# Patient Record
Sex: FEMALE | Race: WHITE | NOT HISPANIC OR LATINO | Employment: UNEMPLOYED | URBAN - METROPOLITAN AREA
[De-identification: names, ages, dates, MRNs, and addresses within clinical notes are randomized per-mention and may not be internally consistent; named-entity substitution may affect disease eponyms.]

---

## 2017-08-10 ENCOUNTER — OFFICE VISIT (OUTPATIENT)
Dept: NEUROLOGY | Facility: MEDICAL CENTER | Age: 52
End: 2017-08-10
Payer: COMMERCIAL

## 2017-08-10 VITALS
BODY MASS INDEX: 26.84 KG/M2 | DIASTOLIC BLOOD PRESSURE: 72 MMHG | SYSTOLIC BLOOD PRESSURE: 114 MMHG | RESPIRATION RATE: 16 BRPM | HEART RATE: 66 BPM | HEIGHT: 69 IN | WEIGHT: 181.2 LBS | OXYGEN SATURATION: 99 % | TEMPERATURE: 97.7 F

## 2017-08-10 DIAGNOSIS — G40.909 SEIZURE DISORDER (HCC): ICD-10-CM

## 2017-08-10 DIAGNOSIS — R41.3 MEMORY DEFICIT: ICD-10-CM

## 2017-08-10 PROCEDURE — 99204 OFFICE O/P NEW MOD 45 MIN: CPT | Performed by: NURSE PRACTITIONER

## 2017-08-10 RX ORDER — IBUPROFEN 800 MG/1
800 TABLET ORAL EVERY 8 HOURS PRN
COMMUNITY
End: 2018-04-06

## 2017-08-10 RX ORDER — LORAZEPAM 1 MG/1
1 TABLET ORAL EVERY 4 HOURS PRN
Qty: 20 TAB | Refills: 0 | Status: SHIPPED | OUTPATIENT
Start: 2017-08-10 | End: 2017-12-18 | Stop reason: SDUPTHER

## 2017-08-10 RX ORDER — LAMOTRIGINE 300 MG/1
1 TABLET, EXTENDED RELEASE ORAL 2 TIMES DAILY
Qty: 180 TAB | Refills: 3 | Status: SHIPPED | OUTPATIENT
Start: 2017-08-10 | End: 2018-04-06 | Stop reason: SDUPTHER

## 2017-08-10 ASSESSMENT — ENCOUNTER SYMPTOMS
SORE THROAT: 0
HEADACHES: 0
HEARTBURN: 1
ABDOMINAL PAIN: 0
DOUBLE VISION: 0
NERVOUS/ANXIOUS: 0
COUGH: 0
MUSCULOSKELETAL NEGATIVE: 1
CONSTITUTIONAL NEGATIVE: 1
SEIZURES: 1
VOMITING: 0
MEMORY LOSS: 1
NAUSEA: 0
DEPRESSION: 0
DIARRHEA: 0

## 2017-08-10 ASSESSMENT — PATIENT HEALTH QUESTIONNAIRE - PHQ9: CLINICAL INTERPRETATION OF PHQ2 SCORE: 0

## 2017-08-10 NOTE — MR AVS SNAPSHOT
"        Sarita Karimi Luz Maria   8/10/2017 9:20 AM   Office Visit   MRN: [id number]    Department:  [facility]   Dept Phone:  [phone]    Description:  Female : 1965   Provider:  ZURDO Turner           Reason for Visit     New Patient Seizures      Allergies as of 8/10/2017     No Known Allergies      You were diagnosed with     Seizure disorder ([facility])   [286933]         Vital Signs     Blood Pressure Pulse Temperature Respirations Height Weight    114/72 mmHg 66 36.5 °C (97.7 °F) 16 1.753 m (5' 9\") 82.192 kg (181 lb 3.2 oz)    Body Mass Index Oxygen Saturation Smoking Status             26.75 kg/m2 99% Current Every Day Smoker         Basic Information     Date Of Birth Sex Race Ethnicity Preferred Language    1965 Female White Non- English      Problem List              ICD-10-CM Priority Class Noted - Resolved    Abdominal pain, unspecified site R10.9   2016 - Present      Health Maintenance     Patient has no pending health maintenance at this time      Current Immunizations     No immunizations on file.      Below and/or attached are the medications your provider expects you to take. Review all of your home medications and newly ordered medications with your provider and/or pharmacist. Follow medication instructions as directed by your provider and/or pharmacist. Please keep your medication list with you and share with your provider. Update the information when medications are discontinued, doses are changed, or new medications (including over-the-counter products) are added; and carry medication information at all times in the event of emergency situations     Allergies:  No Known Allergies          Medications  Valid as of: August 10, 2017 - 10:22 AM    Generic Name Brand Name Tablet Size Instructions for use    Ibuprofen (Tab) MOTRIN 800 MG Take 800 mg by mouth every 8 hours as needed.        LamoTRIgine (TABLET SR 24 HR) LamoTRIgine 300 MG Take 1 mg by " mouth 2 Times a Day.        LORazepam (Tab) ATIVAN 1 MG Take 1 Tab by mouth every four hours as needed for Anxiety.        Naproxen Sodium (Tab) ANAPROX 220 MG Take 220 mg by mouth as needed. Indications: Mild to Moderate Pain        Omeprazole (CAPSULE DELAYED RELEASE) PRILOSEC 40 MG Take 40 mg by mouth 2 times a day.        .                 Medicines prescribed today were sent to:     [address], [place] - [address] [facility] AT [place] [address]    [address] [facility] [place] [address]    Phone: [phone] Fax: [phone]    Open 24 Hours?: No      Medication refill instructions:       If your prescription bottle indicates you have medication refills left, it is not necessary to call your provider’s office. Please contact your pharmacy and they will refill your medication.    If your prescription bottle indicates you do not have any refills left, you may request refills at any time through one of the following ways: The online [employer] system (except Urgent Care), by calling your provider’s office, or by asking your pharmacy to contact your provider’s office with a refill request. Medication refills are processed only during regular business hours and may not be available until the next business day. Your provider may request additional information or to have a follow-up visit with you prior to refilling your medication.   *Please Note: Medication refills are assigned a new Rx number when refilled electronically. Your pharmacy may indicate that no refills were authorized even though a new prescription for the same medication is available at the pharmacy. Please request the medicine by name with the pharmacy before contacting your provider for a refill.        Your To Do List     Future Labs/Procedures Complete By Expires    CBC WITH DIFFERENTIAL  As directed 8/10/2018    COMP METABOLIC PANEL  As directed 8/11/2018    LAMOTRIGINE  As directed 8/10/2018    Comments:     Draw at least 8 hours after medication is taken.    MR-BRAIN-WITH & W/O  As directed 8/10/2018    VITAMIN D,25 HYDROXY  As directed 8/10/2018      Referral     A referral request has been sent to our patient care coordination department. Please allow 3-5 business days for us to process this request and contact you either by phone or mail. If you do not hear from us by the 5th business day, please call us at [phone].           CHORD Access Code: Activation code not generated  Current Agent Pandahart Status: Active          Quit Tobacco Information     Do you want to quit using tobacco?    Quitting tobacco decreases risks of cancer, heart and lung disease, increases life expectancy, improves sense of taste and smell, and increases spending money, among other benefits.    If you are thinking about quitting, we can help.  • [facility] Quit Tobacco Program: 282.218.4571  o Program occurs weekly for four weeks and includes pharmacist consultation on products to support quitting smoking or chewing tobacco. A provider referral is needed for pharmacist consultation.  • Tobacco Users Help Hotline: 1-493-QUIT-NOW (498-9800) or https://[place].quitlogix.org/  o Free, confidential telephone and online coaching for [place] residents. Sessions are designed on a schedule that is convenient for you. Eligible clients receive free nicotine replacement therapy.  • Nationally: www.smokefree.gov  o Information and professional assistance to support both immediate and long-term needs as you become, and remain, a non-smoker. Smokefree.gov allows you to choose the help that best fits your needs.

## 2017-08-19 LAB
25(OH)D3+25(OH)D2 SERPL-MCNC: 36.7 NG/ML (ref 30–100)
ALBUMIN SERPL-MCNC: 5.1 G/DL (ref 3.5–5.5)
ALBUMIN/GLOB SERPL: 2.1 {RATIO} (ref 1.2–2.2)
ALP SERPL-CCNC: 91 IU/L (ref 39–117)
ALT SERPL-CCNC: 8 IU/L (ref 0–32)
AST SERPL-CCNC: 14 IU/L (ref 0–40)
BASOPHILS # BLD AUTO: 0 X10E3/UL (ref 0–0.2)
BASOPHILS NFR BLD AUTO: 0 %
BILIRUB SERPL-MCNC: 0.4 MG/DL (ref 0–1.2)
BUN SERPL-MCNC: 13 MG/DL (ref 6–24)
BUN/CREAT SERPL: 18 (ref 9–23)
CALCIUM SERPL-MCNC: 10.2 MG/DL (ref 8.7–10.2)
CHLORIDE SERPL-SCNC: 104 MMOL/L (ref 96–106)
CO2 SERPL-SCNC: 23 MMOL/L (ref 18–29)
CREAT SERPL-MCNC: 0.73 MG/DL (ref 0.57–1)
EOSINOPHIL # BLD AUTO: 0.1 X10E3/UL (ref 0–0.4)
EOSINOPHIL NFR BLD AUTO: 1 %
ERYTHROCYTE [DISTWIDTH] IN BLOOD BY AUTOMATED COUNT: 14.6 % (ref 12.3–15.4)
GLOBULIN SER CALC-MCNC: 2.4 G/DL (ref 1.5–4.5)
GLUCOSE SERPL-MCNC: 97 MG/DL (ref 65–99)
HCT VFR BLD AUTO: 48.8 % (ref 34–46.6)
HGB BLD-MCNC: 16.3 G/DL (ref 11.1–15.9)
IMM GRANULOCYTES # BLD: 0 X10E3/UL (ref 0–0.1)
IMM GRANULOCYTES NFR BLD: 0 %
IMMATURE CELLS  115398: ABNORMAL
LAMOTRIGINE SERPL-MCNC: 9.5 UG/ML (ref 2–20)
LYMPHOCYTES # BLD AUTO: 2.1 X10E3/UL (ref 0.7–3.1)
LYMPHOCYTES NFR BLD AUTO: 21 %
MCH RBC QN AUTO: 30.3 PG (ref 26.6–33)
MCHC RBC AUTO-ENTMCNC: 33.4 G/DL (ref 31.5–35.7)
MCV RBC AUTO: 91 FL (ref 79–97)
MONOCYTES # BLD AUTO: 0.4 X10E3/UL (ref 0.1–0.9)
MONOCYTES NFR BLD AUTO: 4 %
MORPHOLOGY BLD-IMP: ABNORMAL
NEUTROPHILS # BLD AUTO: 7.7 X10E3/UL (ref 1.4–7)
NEUTROPHILS NFR BLD AUTO: 74 %
NRBC BLD AUTO-RTO: ABNORMAL %
PLATELET # BLD AUTO: 274 X10E3/UL (ref 150–379)
POTASSIUM SERPL-SCNC: 4.5 MMOL/L (ref 3.5–5.2)
PROT SERPL-MCNC: 7.5 G/DL (ref 6–8.5)
RBC # BLD AUTO: 5.38 X10E6/UL (ref 3.77–5.28)
SODIUM SERPL-SCNC: 144 MMOL/L (ref 134–144)
WBC # BLD AUTO: 10.4 X10E3/UL (ref 3.4–10.8)

## 2017-11-17 ENCOUNTER — HOSPITAL ENCOUNTER (OUTPATIENT)
Dept: RADIOLOGY | Facility: MEDICAL CENTER | Age: 52
End: 2017-11-17
Attending: NURSE PRACTITIONER
Payer: COMMERCIAL

## 2017-11-17 ENCOUNTER — NON-PROVIDER VISIT (OUTPATIENT)
Dept: NEUROLOGY | Facility: MEDICAL CENTER | Age: 52
End: 2017-11-17
Payer: COMMERCIAL

## 2017-11-17 VITALS — HEIGHT: 69 IN | WEIGHT: 170 LBS | BODY MASS INDEX: 25.18 KG/M2

## 2017-11-17 DIAGNOSIS — G40.909 SEIZURE DISORDER (HCC): ICD-10-CM

## 2017-11-17 PROCEDURE — 95951 PR EEG MONITORING/VIDEORECORD: CPT | Mod: 52 | Performed by: PSYCHIATRY & NEUROLOGY

## 2017-11-17 PROCEDURE — A9270 NON-COVERED ITEM OR SERVICE: HCPCS | Performed by: RADIOLOGY

## 2017-11-17 PROCEDURE — A9579 GAD-BASE MR CONTRAST NOS,1ML: HCPCS | Performed by: NURSE PRACTITIONER

## 2017-11-17 PROCEDURE — 70553 MRI BRAIN STEM W/O & W/DYE: CPT

## 2017-11-17 PROCEDURE — 700102 HCHG RX REV CODE 250 W/ 637 OVERRIDE(OP): Performed by: RADIOLOGY

## 2017-11-17 PROCEDURE — 700117 HCHG RX CONTRAST REV CODE 255: Performed by: NURSE PRACTITIONER

## 2017-11-17 RX ORDER — DIAZEPAM 5 MG/1
10 TABLET ORAL
Status: COMPLETED | OUTPATIENT
Start: 2017-11-17 | End: 2017-11-17

## 2017-11-17 RX ADMIN — DIAZEPAM 10 MG: 5 TABLET ORAL at 10:40

## 2017-11-17 RX ADMIN — GADODIAMIDE 20 ML: 287 INJECTION INTRAVENOUS at 11:48

## 2017-11-17 ASSESSMENT — PAIN SCALES - GENERAL
PAINLEVEL_OUTOF10: 0
PAINLEVEL_OUTOF10: 0

## 2017-11-17 NOTE — DISCHARGE INSTRUCTIONS
MRI ADULT DISCHARGE INSTRUCTIONS    You have been medicated today for your scan. Please follow the instructions below to ensure your safe recovery. If you have any questions or problems, feel free to call us at [phone] or [phone].     1.   Have someone stay with you to assist you as needed.    2.   Do not drive or operate any mechanical devices.    3.   Do not perform any activity that requires concentration. Make no major decisions over the next 24 hours.     4.   Be careful changing positions from laying down to sitting or standing, as you may become dizzy.     5.   Do not drink alcohol for 48 hours.    6.   There are no restrictions for eating your normal meals. Drink fluids.    7.   You may continue your usual medications for pain, tranquilizers, muscle relaxants or sedatives when awake.     8.   Tomorrow, you may continue your normal daily activities.     9.   Pressure dressing on 10 - 15 minutes. If swelling or bleeding occurs when removed, continue placing direct pressure on injection site for another 5 minutes, or until bleeding stops.     I have been informed of and understand the above discharge instructions. Diazepam (VALIUM) Oral solution  What is this medicine?  You were prescribed DIAZEPAM (dye AZ e vani) for the procedure you had today. This medication is a benzodiazepine. It is used to treat anxiety and nervousness. It also can help treat alcohol withdrawal, relax muscles, and treat certain types of seizures.  This medicine may be used for other purposes; ask your health care provider or pharmacist if you have questions.  What side effects may I notice from receiving this medicine?  Side effects that you should report to your doctor or health care professional as soon as possible:  • allergic reactions like skin rash, itching or hives, swelling of the face, lips, or tongue  • angry, confused, depressed, other mood changes  • breathing problems  • feeling faint or lightheaded, falls  • muscle  cramps  • problems with balance, talking, walking  • restlessness  • tremors  • trouble passing urine or change in the amount of urine  • unusually weak or tired  Side effects that usually do not require medical attention (report to your doctor or health care professional if they continue or are bothersome):  • difficulty sleeping, nightmares  • dizziness, drowsiness, clumsiness, or unsteadiness, a hangover effect  • headache  • nausea, vomiting  This list may not describe all possible side effects. Call your doctor for medical advice about side effects. You may report side effects to FDA at 4-437-SZO-3800.

## 2017-11-21 ENCOUNTER — TELEPHONE (OUTPATIENT)
Dept: NEUROLOGY | Facility: MEDICAL CENTER | Age: 52
End: 2017-11-21

## 2017-11-21 NOTE — PROCEDURES
VIDEO ELECTROENCEPHALOGRAM REPORT        Referring provider: MARY JANE Raygoza.      DOS: 11/17/2017 (total recording of 36 minutes).      INDICATION:  Sarita Loera 51 y.o. female presenting with history of seizures.      CURRENT ANTIEPILEPTIC REGIMEN: Lamotrigine 300 mg po bid.      TECHNIQUE: 30 channel video electroencephalogram (EEG) was performed in accordance with the international 10-20 system. The study was reviewed in bipolar and referential montages. The recording examined the patient during wakeful and drowsy state(s).      DESCRIPTION OF THE RECORD:  During the wakefulness, the background showed a symmetrical 12 Hz alpha activity posteriorly with amplitude of 70 mV.  There was reactivity to eye closure/opening.  A normal anterior-posterior gradient was noted with faster beta frequencies seen anteriorly.  During drowsiness, theta frequencies were seen.     ACTIVATION PROCEDURES:   Hyperventilation was performed by the patient for a total of 3 minutes. The technician performing the test noted good effort. However, no significant background changes noted.      Intermittent Photic stimulation was performed in a stepwise fashion from 1 to 30 Hz and elicited a normal response (photic driving), most noticeable in the posterior leads.        ICTAL AND/OR INTERICTAL FINDINGS:   Frequent left anterior temporal sharps and intermittent left anterior temporal slowing. No clinical events or seizures were reported or recorded during the study.      EKG: sampling of the EKG recording demonstrated sinus rhythm.         INTERPRETATION:  This is an abnormal video EEG recording in the awake, and drowsy state(s). Frequent left anterior temporal sharps and intermittent left anterior temporal slowing. The findings increase risk for seizures and suggest underlying area of cortical irritability and structural abnormality. Clinical and radiological correlation is recommended.           Nixon Tran MD  Medical Director,  Epilepsy and Neurodiagnostics.   Clinical  of Neurology [facility].   Diplomate in Neurology, Epilepsy, and Electrodiagnostic Medicine.   Office: [phone]  Fax: [phone]       GAEL PICHARDO    DD:  11/20/2017 16:49:21  DT:  11/20/2017 16:55:32    D#:  [id number]  Job#:  [id number]    cc: SINGH HINTON

## 2017-11-21 NOTE — PROGRESS NOTES
VIDEO ELECTROENCEPHALOGRAM REPORT      Referring provider: MARY JANE Raygoza.     DOS: 11/17/2017 (total recording of 36 minutes).     INDICATION:  Sarita Loera 51 y.o. female presenting with history of seizures.     CURRENT ANTIEPILEPTIC REGIMEN: Lamotrigine 300 mg po bid.     TECHNIQUE: 30 channel video electroencephalogram (EEG) was performed in accordance with the international 10-20 system. The study was reviewed in bipolar and referential montages. The recording examined the patient during wakeful and drowsy state(s).     DESCRIPTION OF THE RECORD:  During the wakefulness, the background showed a symmetrical 12 Hz alpha activity posteriorly with amplitude of 70 mV.  There was reactivity to eye closure/opening.  A normal anterior-posterior gradient was noted with faster beta frequencies seen anteriorly.  During drowsiness, theta frequencies were seen.    ACTIVATION PROCEDURES:   Hyperventilation was performed by the patient for a total of 3 minutes. The technician performing the test noted good effort. However, no significant background changes noted.     Intermittent Photic stimulation was performed in a stepwise fashion from 1 to 30 Hz and elicited a normal response (photic driving), most noticeable in the posterior leads.      ICTAL AND/OR INTERICTAL FINDINGS:   Frequent left anterior temporal sharps and intermittent left anterior temporal slowing. No clinical events or seizures were reported or recorded during the study.     EKG: sampling of the EKG recording demonstrated sinus rhythm.       INTERPRETATION:  This is an abnormal video EEG recording in the awake, and drowsy state(s). Frequent left anterior temporal sharps and intermittent left anterior temporal slowing. The findings increase risk for seizures and suggest underlying area of cortical irritability and structural abnormality. Clinical and radiological correlation is recommended.        Nixon Tran MD  Medical Director, Epilepsy and  Neurodiagnostics.   Clinical  of Neurology [facility].   Diplomate in Neurology, Epilepsy, and Electrodiagnostic Medicine.   Office: [phone]  Fax: [phone]

## 2017-12-14 ENCOUNTER — APPOINTMENT (OUTPATIENT)
Dept: NEUROLOGY | Facility: MEDICAL CENTER | Age: 52
End: 2017-12-14
Payer: COMMERCIAL

## 2017-12-18 ENCOUNTER — OFFICE VISIT (OUTPATIENT)
Dept: NEUROLOGY | Facility: MEDICAL CENTER | Age: 52
End: 2017-12-18
Payer: COMMERCIAL

## 2017-12-18 DIAGNOSIS — G40.909 SEIZURE DISORDER (HCC): ICD-10-CM

## 2017-12-18 DIAGNOSIS — G40.109 PARTIAL SEIZURE DISORDER (HCC): ICD-10-CM

## 2017-12-18 PROCEDURE — 99213 OFFICE O/P EST LOW 20 MIN: CPT | Performed by: NURSE PRACTITIONER

## 2017-12-18 RX ORDER — HYDROCODONE BITARTRATE AND ACETAMINOPHEN 7.5; 325 MG/1; MG/1
1-2 TABLET ORAL EVERY 6 HOURS PRN
COMMUNITY
End: 2018-04-06

## 2017-12-18 RX ORDER — LORAZEPAM 1 MG/1
TABLET ORAL
Qty: 20 TAB | Refills: 0 | Status: SHIPPED
Start: 2017-12-18 | End: 2018-04-06 | Stop reason: SDUPTHER

## 2017-12-18 NOTE — PROGRESS NOTES
"Subjective:      Sarita Loera is a 52 y.o. female who presents with Follow-Up (Seizure disorder)    Here with  today.        HPI  Started on propranolol to treat a tremor-- per PCP.  This caused \"multiple seizures\", feeling very dizzy.  She took this medication, unknown dose, for about 6 months and stopped about one month ago.  However, this time frame does not add up as our last appointment was 8/10/2017 and she was not taking the propranolol at that time (today is 12/18/17).    They report that she has not had any seizures in the past few months.    11/2017:  ICTAL AND/OR INTERICTAL FINDINGS:   Frequent left anterior temporal sharps and intermittent left anterior temporal slowing. No clinical events or seizures were reported or recorded during the study.     Brain MRI 2017:  Impression     1.  No evidence of acute territorial infarct, intracranial hemorrhage or mass lesion.  2.  Rare scattered nonspecific periventricular foci of T2 and FLAIR signal hyperintensities consistent with microangiopathic ischemic change versus demyelination or gliosis.     When she takes ativan for sleep then she sleeps very well.    Mood is not as stable.  They comment that she is emotionally labile and has minimal tolerance of people in general.    She appears altered, foggy, with her train of though and conversation skills today.    Current Outpatient Prescriptions   Medication Sig Dispense Refill   • hydrocodone-acetaminophen (NORCO) 7.5-325 MG per tablet Take 1-2 Tabs by mouth every 6 hours as needed.     • ibuprofen (MOTRIN) 800 MG Tab Take 800 mg by mouth every 8 hours as needed.     • LamoTRIgine (LAMICTAL XR) 300 MG TABLET SR 24 HR Take 1 mg by mouth 2 Times a Day. 180 Tab 3   • lorazepam (ATIVAN) 1 MG Tab Take 1 Tab by mouth every four hours as needed for Anxiety. 20 Tab 0   • naproxen (ALEVE) 220 MG tablet Take 220 mg by mouth as needed. Indications: Mild to Moderate Pain     • omeprazole (PRILOSEC) 40 MG " delayed-release capsule Take 40 mg by mouth 2 times a day.       No current facility-administered medications for this visit.        Review of Systems   HENT: Negative for hearing loss, nosebleeds and sore throat.         No recent head injury.   Eyes: Negative for double vision.        No new loss of vision.   Respiratory: Negative for cough.         No recent lung infections.   Cardiovascular: Negative for chest pain.   Gastrointestinal: Positive for heartburn (Cosme's esophagus). Negative for abdominal pain, diarrhea, nausea and vomiting.   Genitourinary: Negative.    Musculoskeletal: Negative.    Skin: Negative.    Neurological: Positive for seizures. Negative for headaches.   Endo/Heme/Allergies:        No history of endocrine dysfunction.  No new problems.   Psychiatric/Behavioral: Positive for memory loss. Negative for depression. The patient is nervous/anxious.         No recent mood changes.          Objective:     There were no vitals taken for this visit.     Physical Exam   Constitutional: She is oriented to person, place, and time. She appears well-developed and well-nourished.   HENT:   Head: Normocephalic and atraumatic.   Eyes: EOM are normal.   Wears glasses     Neck: Normal range of motion.   Cardiovascular: Normal rate and regular rhythm.    Pulmonary/Chest: Effort normal.   Neurological: She is alert and oriented to person, place, and time. She exhibits normal muscle tone. Gait normal.   No observable changes in neurologic status.  See initial new patient examination for details.    Skin: Skin is warm. There is pallor.   Psychiatric: She is slowed. She expresses impulsivity. She exhibits abnormal recent memory.             Assessment/Plan:     Seizure disorder:  History of events suggestive of complex partial seizures with secondary generalization since 2004.  Only AED prescribed is lamotrigine and Lamictal.  Continues to have uncontrolled seizures primarily nocturnal in nature however there  have been no obvious seizures in a few months.    INTERPRETATION:  This is an abnormal video EEG recording in the awake, and drowsy state(s). Frequent left anterior temporal sharps and intermittent left anterior temporal slowing. The findings increase risk for seizures and suggest underlying area of cortical irritability and structural abnormality. Clinical and radiological correlation is recommended.     1) Will continue Lamictal ER 300mg BID.  2)  Discussed AED options-- she is very irritable and appears to be altered-- using marijuana throughout the day.  Describing possible peak dose side effects.    New Rx for ativan 1mg tablet provided.     Discussed adding a new AED-- consider Fycompa, Vimpat, Zonegran, Keppra, etc.  There are many options available to her.     Wishes to first have her VEEG and then proceed with addition of a new AED.     Obtain labs as ordered.     Return for follow-up in 3 months to discuss diagnostic evaluation.        EDUCATION AND COUNSELING:  -Education was provided to the patient and/or family regarding diagnosis and prognosis. The chronic and unpredictable nature of the condition was discussed. There is increased risk for additional events, which may carry potential for significant injuries and death.    -We reviewed the current antiepileptic regimen. Potential side effects of antiepileptics were discussed at length, including but no limited to: hypersensitivity reactions (rash and others, some of which can be fatal), visual field changes (some of which may be irreversible), glaucoma, diplopia, kidney stones, osteopenia/osteoporosis/bone fractures, hyperthermia/anhydrosis, tremors, ataxia, dizziness, fatigue, increased risk for falls, cardiac arrhythmias/syncope, gastrointestinal (hepatitis, pancreatitis, gastritis, ulcers), gingival hypertrophy, drowsiness, sedation, anxiety/nervousness, increased risk for suicide, increased risk for depression, and psychosis. We reviewed drug-drug  interactions and their potential effect on seizure control and medication side effects.    -Patient/family educated on SUDEP (Sudden Death in Epilepsy). Counseling was provided on the importance of strict medication and follow up compliance. The patient understands the risks associated with non-adherence with the medical plan as outlined, including but not limited to an increased risk for breakthrough seizures, which may contribute to injuries, disability, status epilepticus, and even death.    -Counseling was also provided on potential effects of alcohol and other drugs, which may lower seizure threshold and/or affect the metabolism of antiepileptic drugs. I recommend avoidance of alcohol and illegal drugs.  -Recommend proper hydration, regular exercise, proper sleep hygiene (7-8 hrs of overnight sleep, avoid sleep deprivation).    -I have made the patient aware of mandatory reporting required by the law in the State of [place] regarding episodes of seizures, loss of consciousness, and/or alteration of awareness. The patient and family are responsible for reporting events to the DMV, instructions were provided. The patient verbalized understanding and states she minimally is driving. Other seizure precautions were discussed at length, including no diving, no skydiving, no unsupervised swimming, no Jacuzzi or bathing in bathtubs.       Pt agrees with plan.

## 2017-12-19 ASSESSMENT — ENCOUNTER SYMPTOMS
SEIZURES: 1
VOMITING: 0
DIARRHEA: 0
MEMORY LOSS: 1
SORE THROAT: 0
HEADACHES: 0
NAUSEA: 0
COUGH: 0
NERVOUS/ANXIOUS: 1
HEARTBURN: 1
ABDOMINAL PAIN: 0
MUSCULOSKELETAL NEGATIVE: 1
DEPRESSION: 0
DOUBLE VISION: 0

## 2018-02-14 ENCOUNTER — TELEPHONE (OUTPATIENT)
Dept: NEUROLOGY | Facility: MEDICAL CENTER | Age: 53
End: 2018-02-14

## 2018-02-14 NOTE — TELEPHONE ENCOUNTER
Received voicemail from patient's  today, he states that patient has been having increased seizure activity the last couple of nights during the nights. Called patient back to get some further information on what is happening. Left voicemail asking for a phone call back.

## 2018-02-15 NOTE — TELEPHONE ENCOUNTER
When you contact either Sarita or , please find out where she is with the Fycompa titration.  Has that made a difference with her seizures?    Did they  the Ativan in December?

## 2018-02-16 NOTE — TELEPHONE ENCOUNTER
Spoke with patient's  this morning.     When change was noticed- 02/13/18  Frequency- multiple   Type of seizure- Sleeping, Shaking, one right after the other.    Current Medications- Fycompa 2 mg, Lamictal Xr 300 BID,      Any Medication changes?- Fycompa   Date- since last appointment    Illness/Fever? n  Menses? n  Stress? Yes- getting ready to travel to [place], her father's health is failing.    Sleep Deprivation? n  Driving? n    Caller: Be Loera  Call Back #: [phone]  OK to leave detailed message: yes    Patient's  reports that patient when patient upped her dosage of Fycompa to 3mg that she started experiencing a side effect that her skin was burning. Patient described it like she was on fire from the inside out. Patient dropped back down the the 1 tab and the symptoms went away. Up until this last stressful week, patient has been doing very well. Ativan he believes was picked up.

## 2018-02-27 NOTE — TELEPHONE ENCOUNTER
Left message letting them know that we just wanted to check in and see how everything is going. Asked that they give us a phone call back at their earliest convenience.

## 2018-02-28 NOTE — TELEPHONE ENCOUNTER
Spoke with patient's  today, he states that patient has been doing well. No further episodes to report. Patient made it through the last two extremely stressful weeks without having any further issues.

## 2018-04-06 ENCOUNTER — TELEPHONE (OUTPATIENT)
Dept: NEUROLOGY | Facility: MEDICAL CENTER | Age: 53
End: 2018-04-06

## 2018-04-06 ENCOUNTER — OFFICE VISIT (OUTPATIENT)
Dept: NEUROLOGY | Facility: MEDICAL CENTER | Age: 53
End: 2018-04-06
Payer: COMMERCIAL

## 2018-04-06 VITALS
SYSTOLIC BLOOD PRESSURE: 100 MMHG | HEART RATE: 66 BPM | BODY MASS INDEX: 27.1 KG/M2 | WEIGHT: 182.98 LBS | DIASTOLIC BLOOD PRESSURE: 70 MMHG | HEIGHT: 69 IN | RESPIRATION RATE: 16 BRPM | OXYGEN SATURATION: 96 %

## 2018-04-06 DIAGNOSIS — G40.909 SEIZURE DISORDER (HCC): ICD-10-CM

## 2018-04-06 DIAGNOSIS — G40.109 PARTIAL SEIZURE DISORDER (HCC): ICD-10-CM

## 2018-04-06 PROCEDURE — 99213 OFFICE O/P EST LOW 20 MIN: CPT | Performed by: NURSE PRACTITIONER

## 2018-04-06 RX ORDER — OMEPRAZOLE 40 MG/1
CAPSULE, DELAYED RELEASE ORAL
COMMUNITY
Start: 2018-02-26 | End: 2021-09-20

## 2018-04-06 RX ORDER — LAMOTRIGINE 300 MG/1
1 TABLET, EXTENDED RELEASE ORAL 2 TIMES DAILY
Qty: 180 TAB | Refills: 3 | Status: SHIPPED | OUTPATIENT
Start: 2018-04-06 | End: 2019-06-27 | Stop reason: SDUPTHER

## 2018-04-06 RX ORDER — LORAZEPAM 1 MG/1
TABLET ORAL
Qty: 20 TAB | Refills: 0 | Status: SHIPPED | OUTPATIENT
Start: 2018-04-06 | End: 2018-08-05

## 2018-04-06 ASSESSMENT — ENCOUNTER SYMPTOMS
DEPRESSION: 0
DIARRHEA: 0
SEIZURES: 1
SORE THROAT: 0
VOMITING: 0
NERVOUS/ANXIOUS: 1
COUGH: 0
CONSTITUTIONAL NEGATIVE: 1
NAUSEA: 0
HEADACHES: 0
DOUBLE VISION: 0
MUSCULOSKELETAL NEGATIVE: 1
HEARTBURN: 1
ABDOMINAL PAIN: 0
MEMORY LOSS: 1

## 2018-04-06 NOTE — TELEPHONE ENCOUNTER
[facility] called [facility] just needed some clarification on pt's Fycompa 2 mg because part of the directions were cut off through fax.

## 2018-04-06 NOTE — TELEPHONE ENCOUNTER
Prescription for FYCOMPA  2mg Take 3 mg PO qhs x2 weeks, 4 mg PO qhs thereafter w/ 5 refills sent to [facility] via Fax on 4/6/2018.  Confirmation scanned into chart.

## 2018-04-06 NOTE — PROGRESS NOTES
Subjective:      Sarita Loera is a 52 y.o. female who presents with Follow-Up (PT states she had a seizure two weeks ago and bit her tongue prety badly.  Has had multiple seizures within the four months.  Needs refills of lamotrigine and perampanel.  Would like a refill of lorazepam for anxieyt.)          HPI  Here for a follow-up today.    Really appreciates the ativan 1mg tablets.  She feels like she has good seizure control with the current medications.  Of note, she did run out of name brand Lamictal about 3 weeks ago and had a seizure.  She is now taking generic lamotrigine and feels just fine.  She is asking for a generic prescription today.    Going to [place] in a month from now.      11/2017:  ICTAL AND/OR INTERICTAL FINDINGS:   Frequent left anterior temporal sharps and intermittent left anterior temporal slowing. No clinical events or seizures were reported or recorded during the study.     Current Outpatient Prescriptions   Medication Sig Dispense Refill   • omeprazole (PRILOSEC) 40 MG delayed-release capsule 1 po QD     • perampanel (FYCOMPA) 2 MG Tab tablet Take 3 Tabs by mouth every bedtime for 181 days. Titrate per separate written instructions.  Take 2mg po qhs X 2 weeks then 3mg po qhs X 2 weeks then 4mg po qhs X 2 weeks then 5mg po qhs X 2 weeks then 6mg po qhs thereafter. 90 Tab 5   • lorazepam (ATIVAN) 1 MG Tab Take 1/2-1 tablet PO PRN breakthrough seizures.  Do not exceed more than 2 tablets in 24 hours unless directed otherwise by physician. 20 Tab 0   • LamoTRIgine (LAMICTAL XR) 300 MG TABLET SR 24 HR Take 1 mg by mouth 2 Times a Day. 180 Tab 3     No current facility-administered medications for this visit.        Review of Systems   Constitutional: Negative.    HENT: Negative for hearing loss, nosebleeds and sore throat.         No recent head injury.   Eyes: Negative for double vision.        No new loss of vision.   Respiratory: Negative for cough.         No recent lung infections.  "  Cardiovascular: Negative for chest pain.   Gastrointestinal: Positive for heartburn (Cosme's). Negative for abdominal pain, diarrhea, nausea and vomiting.   Genitourinary: Negative.    Musculoskeletal: Negative.    Skin: Negative.    Neurological: Positive for seizures. Negative for headaches.   Endo/Heme/Allergies:        No history of endocrine dysfunction.  No new problems.   Psychiatric/Behavioral: Positive for memory loss (chronic). Negative for depression. The patient is nervous/anxious.         No recent mood changes.          Objective:     /70   Pulse 66   Resp 16   Ht 1.753 m (5' 9\")   Wt 83 kg (182 lb 15.7 oz)   SpO2 96%   BMI 27.02 kg/m²      Physical Exam   Constitutional: She is oriented to person, place, and time. She appears well-developed and well-nourished.   HENT:   Head: Normocephalic.   Eyes: EOM are normal.   Wears glasses   Neck: Normal range of motion.   Cardiovascular: Normal rate and regular rhythm.    Pulmonary/Chest: Effort normal.   Neurological: She is alert and oriented to person, place, and time. She exhibits normal muscle tone. Gait normal.   No observable changes in neurologic status.  See initial new patient examination for details.    Skin: Skin is warm.   Psychiatric: She has a normal mood and affect. She is slowed. She expresses impulsivity.   Pleasant           Assessment/Plan:     Seizure disorder:  History of events suggestive of complex partial seizures with secondary generalization since 2004.  Only AED prescribed is lamotrigine and Lamictal.    Very minimal seizure activity-- most notable when she skipped doses of her Lamictal.     INTERPRETATION:  This is an abnormal video EEG recording in the awake, and drowsy state(s). Frequent left anterior temporal sharps and intermittent left anterior temporal slowing. The findings increase risk for seizures and suggest underlying area of cortical irritability and structural abnormality. Clinical and radiological " correlation is recommended.     1) Will continue Lamictal ER 300mg BID with transition to generic LTG.  2) Continue Fycompa with dose increase from 2mg qhs to 3mg qhs X 2 weeks then 4mg qhs thereafter.  3)New Rx for ativan 1mg tablet provided.  She still has 4 tablets left of the #20 tablet supplied last year.    Return for follow-up in 3-4 months.  She will call with any concerns in the interim.

## 2018-08-13 ENCOUNTER — OFFICE VISIT (OUTPATIENT)
Dept: NEUROLOGY | Facility: MEDICAL CENTER | Age: 53
End: 2018-08-13
Payer: COMMERCIAL

## 2018-08-13 VITALS
WEIGHT: 174.16 LBS | DIASTOLIC BLOOD PRESSURE: 66 MMHG | BODY MASS INDEX: 25.8 KG/M2 | SYSTOLIC BLOOD PRESSURE: 110 MMHG | HEIGHT: 69 IN | HEART RATE: 68 BPM | OXYGEN SATURATION: 92 % | TEMPERATURE: 97.8 F | RESPIRATION RATE: 16 BRPM

## 2018-08-13 DIAGNOSIS — G40.109 PARTIAL SEIZURE DISORDER (HCC): ICD-10-CM

## 2018-08-13 DIAGNOSIS — G40.909 SEIZURE DISORDER (HCC): ICD-10-CM

## 2018-08-13 PROCEDURE — 99214 OFFICE O/P EST MOD 30 MIN: CPT | Performed by: NURSE PRACTITIONER

## 2018-08-13 RX ORDER — IBUPROFEN 800 MG/1
800 TABLET ORAL EVERY 8 HOURS PRN
COMMUNITY
End: 2018-11-09

## 2018-08-13 RX ORDER — KETOROLAC TROMETHAMINE 10 MG/1
10 TABLET, FILM COATED ORAL EVERY 4 HOURS PRN
COMMUNITY
End: 2018-11-09

## 2018-08-13 RX ORDER — LORAZEPAM 1 MG/1
TABLET ORAL
COMMUNITY
Start: 2018-04-06 | End: 2018-11-20 | Stop reason: SDUPTHER

## 2018-08-13 RX ORDER — METHOCARBAMOL 750 MG/1
750 TABLET, FILM COATED ORAL 4 TIMES DAILY
COMMUNITY
End: 2018-11-09

## 2018-08-13 ASSESSMENT — PATIENT HEALTH QUESTIONNAIRE - PHQ9
CLINICAL INTERPRETATION OF PHQ2 SCORE: 1
5. POOR APPETITE OR OVEREATING: 3 - NEARLY EVERY DAY
SUM OF ALL RESPONSES TO PHQ QUESTIONS 1-9: 16

## 2018-08-13 NOTE — PROGRESS NOTES
Subjective:      Sarita Loera is a 52 y.o. female who presents with Follow-Up (Seizure disorder)          HPI  Here for a follow-up today.     Reports about 10-15 seizures since May.  She had a bad seizure on July 6th, 2018.  Then the next morning she had significant chest pain.  She was seen in the ED and told that diagnostic testing was normal.  Was given Dilaudid two times plus a muscle relaxer.  Took robaxin and then had seizures that night.    Seizure Diary:  June 7th and 8th  Chest pain on July 6th, about 6 seizures during the night.  July 20th GTC   Aug 4th    In the months of June and July it seems like she is having a lot of seizures.    She has been prescribed Robaxin. Taking ativan very sparingly.    11/2017:  ICTAL AND/OR INTERICTAL FINDINGS:   Frequent left anterior temporal sharps and intermittent left anterior temporal slowing. No clinical events or seizures were reported or recorded during the study.     Diagnosed with pneumonia after travelling and being sick at least for 2 weeks.  Her treatment course was extensive.    Current Outpatient Prescriptions   Medication Sig Dispense Refill   • ketorolac (TORADOL) 10 MG Tab Take 10 mg by mouth every four hours as needed.     • methocarbamol (ROBAXIN) 750 MG Tab Take 750 mg by mouth 4 times a day.     • LORazepam (ATIVAN) 1 MG Tab Take 1/2-1 tablet PO PRN breakthrough seizures.  Do not exceed more than 2 tablets in 24 hours unless directed otherwise by physician.     • ibuprofen (MOTRIN) 800 MG Tab Take 800 mg by mouth every 8 hours as needed.     • omeprazole (PRILOSEC) 40 MG delayed-release capsule 1 po QD     • perampanel (FYCOMPA) 2 MG Tab tablet Take 3 Tabs by mouth every bedtime for 181 days. Take 3mg po qhs X 2 weeks 4mg po qhs thereafter. (Patient taking differently: Take 2 mg by mouth every bedtime. Take 3mg po qhs X 2 weeks 4mg po qhs thereafter.) 60 Tab 5   • LamoTRIgine (LAMICTAL XR) 300 MG TABLET SR 24 HR Take 1 mg by mouth 2 Times a  "Day. 180 Tab 3     No current facility-administered medications for this visit.        Review of Systems   Constitutional: Negative.    HENT: Negative for hearing loss, nosebleeds and sore throat.         No recent head injury.   Eyes: Negative for double vision.        No new loss of vision.   Respiratory: Negative for cough.         No recent lung infections.   Cardiovascular: Negative for chest pain.   Gastrointestinal: Positive for heartburn (Cosme's esophagus). Negative for abdominal pain, diarrhea, nausea and vomiting.   Genitourinary: Negative.    Musculoskeletal: Negative.    Skin: Negative.    Neurological: Positive for seizures. Negative for headaches.   Endo/Heme/Allergies:        No history of endocrine dysfunction.  No new problems.   Psychiatric/Behavioral: Positive for memory loss (chronic). Negative for depression. The patient is nervous/anxious.         No recent mood changes.          Objective:     /66   Pulse 68   Temp 36.6 °C (97.8 °F)   Resp 16   Ht 1.753 m (5' 9\")   Wt 79 kg (174 lb 2.6 oz)   SpO2 92%   BMI 25.72 kg/m²      Physical Exam   Constitutional: She is oriented to person, place, and time. She appears well-developed and well-nourished.   HENT:   Head: Normocephalic and atraumatic.   Wears glasses     Eyes: Pupils are equal, round, and reactive to light.   Neck: Normal range of motion.   Cardiovascular: Normal rate and regular rhythm.    Pulmonary/Chest: Effort normal.   Neurological: She is alert and oriented to person, place, and time. She exhibits normal muscle tone. Gait normal.   No observable changes in neurologic status.  See initial new patient examination for details.    Skin: Skin is warm.               Assessment/Plan:     Seizure disorder:  History of events suggestive of complex partial seizures with secondary generalization since 2004.  Only AED prescribed is lamotrigine and Lamictal.     Increase in seizures in the past few months for no apparent " reason.     INTERPRETATION:  This is an abnormal video EEG recording in the awake, and drowsy state(s). Frequent left anterior temporal sharps and intermittent left anterior temporal slowing. The findings increase risk for seizures and suggest underlying area of cortical irritability and structural abnormality. Clinical and radiological correlation is recommended.     1) Will continue LTG ER 300mg BID.  2) Continue Fycompa from 2mg qhs to 3mg qhs.  My hope is to then further titrate to 4mg qhs if possible.  3)  Still has ativan 1mg tablets.    Introduced the VNS option and they are very interested.  Informational pamphlets provided.  They will call when they wish to proceed with implantation.     Reinforced general safety including SUDEP caution.    Return for follow-up in 3-4 months.        EDUCATION AND COUNSELING:  -Education was provided to the patient and/or family regarding diagnosis and prognosis. The chronic and unpredictable nature of the condition was discussed. There is increased risk for additional events, which may carry potential for significant injuries and death.    -We reviewed the current antiepileptic regimen. Potential side effects of antiepileptics were discussed at length, including but no limited to: hypersensitivity reactions (rash and others, some of which can be fatal), visual field changes (some of which may be irreversible), glaucoma, diplopia, kidney stones, osteopenia/osteoporosis/bone fractures, hyperthermia/anhydrosis, tremors, ataxia, dizziness, fatigue, increased risk for falls, cardiac arrhythmias/syncope, gastrointestinal (hepatitis, pancreatitis, gastritis, ulcers), gingival hypertrophy, drowsiness, sedation, anxiety/nervousness, increased risk for suicide, increased risk for depression, and psychosis. We reviewed drug-drug interactions and their potential effect on seizure control and medication side effects.    -Patient/family educated on SUDEP (Sudden Death in Epilepsy).  Counseling was provided on the importance of strict medication and follow up compliance. The patient understands the risks associated with non-adherence with the medical plan as outlined, including but not limited to an increased risk for breakthrough seizures, which may contribute to injuries, disability, status epilepticus, and even death.    -Counseling was also provided on potential effects of alcohol and other drugs, which may lower seizure threshold and/or affect the metabolism of antiepileptic drugs. I recommend avoidance of alcohol and illegal drugs.  -Recommend proper hydration, regular exercise, proper sleep hygiene (7-8 hrs of overnight sleep, avoid sleep deprivation).    -I have made the patient aware of mandatory reporting required by the law in the State of [place] regarding episodes of seizures, loss of consciousness, and/or alteration of awareness. The patient and family are responsible for reporting events to the DMV, instructions were provided. The patient verbalized understanding and states she has not been driving. Other seizure precautions were discussed at length, including no diving, no skydiving, no unsupervised swimming, no Jacuzzi or bathing in bathtubs.    Pt agrees with plan.

## 2018-08-15 ENCOUNTER — TELEPHONE (OUTPATIENT)
Dept: NEUROLOGY | Facility: MEDICAL CENTER | Age: 53
End: 2018-08-15

## 2018-08-15 ASSESSMENT — ENCOUNTER SYMPTOMS
ABDOMINAL PAIN: 0
CONSTITUTIONAL NEGATIVE: 1
NAUSEA: 0
SEIZURES: 1
DOUBLE VISION: 0
MUSCULOSKELETAL NEGATIVE: 1
COUGH: 0
SORE THROAT: 0
DEPRESSION: 0
MEMORY LOSS: 1
HEADACHES: 0
DIARRHEA: 0
NERVOUS/ANXIOUS: 1
HEARTBURN: 1
VOMITING: 0

## 2018-08-15 NOTE — TELEPHONE ENCOUNTER
Patient's pharmacy phoned today. Could you please clarify sig on patient's Fycompa? How many days do you want her on the lower dosage prior to bumping up to the higher dosage? Please advise.

## 2018-08-16 NOTE — TELEPHONE ENCOUNTER
I'd like her to increase the Fycompa from 2mg qhs to 3mg qhs as soon as possible.  This is the one medication in our EPIC system that won't let me change the parameters.  Sorry!

## 2018-09-11 ENCOUNTER — TELEPHONE (OUTPATIENT)
Dept: NEUROLOGY | Facility: MEDICAL CENTER | Age: 53
End: 2018-09-11

## 2018-11-06 ENCOUNTER — TELEPHONE (OUTPATIENT)
Dept: NEUROLOGY | Facility: MEDICAL CENTER | Age: 53
End: 2018-11-06

## 2018-11-06 NOTE — TELEPHONE ENCOUNTER
Patient is having VNS implanted November 14th. She phoned today asking if she had to wait until December 17th to be seen. I did have an appointment on hold on the 20th of November - which we scheduled her into. Is this the right time frame? Or did you want it to be 2 weeks after VNS is  Implanted? Please advise.

## 2018-11-09 ENCOUNTER — APPOINTMENT (OUTPATIENT)
Dept: ADMISSIONS | Facility: MEDICAL CENTER | Age: 53
End: 2018-11-09
Attending: OTOLARYNGOLOGY
Payer: COMMERCIAL

## 2018-11-14 ENCOUNTER — HOSPITAL ENCOUNTER (OUTPATIENT)
Facility: MEDICAL CENTER | Age: 53
End: 2018-11-14
Attending: OTOLARYNGOLOGY | Admitting: OTOLARYNGOLOGY
Payer: COMMERCIAL

## 2018-11-14 VITALS
OXYGEN SATURATION: 95 % | SYSTOLIC BLOOD PRESSURE: 96 MMHG | DIASTOLIC BLOOD PRESSURE: 57 MMHG | TEMPERATURE: 97.7 F | HEIGHT: 69 IN | BODY MASS INDEX: 25.83 KG/M2 | WEIGHT: 174.38 LBS | HEART RATE: 74 BPM | RESPIRATION RATE: 16 BRPM

## 2018-11-14 DIAGNOSIS — G40.919 INTRACTABLE SEIZURE DISORDER (HCC): ICD-10-CM

## 2018-11-14 DIAGNOSIS — G89.18 POST-OP PAIN: ICD-10-CM

## 2018-11-14 PROCEDURE — 160028 HCHG SURGERY MINUTES - 1ST 30 MINS LEVEL 3: Performed by: OTOLARYNGOLOGY

## 2018-11-14 PROCEDURE — 160025 RECOVERY II MINUTES (STATS): Performed by: OTOLARYNGOLOGY

## 2018-11-14 PROCEDURE — 160002 HCHG RECOVERY MINUTES (STAT): Performed by: OTOLARYNGOLOGY

## 2018-11-14 PROCEDURE — 502573 HCHG PACK, ENT: Performed by: OTOLARYNGOLOGY

## 2018-11-14 PROCEDURE — 160046 HCHG PACU - 1ST 60 MINS PHASE II: Performed by: OTOLARYNGOLOGY

## 2018-11-14 PROCEDURE — 700111 HCHG RX REV CODE 636 W/ 250 OVERRIDE (IP): Performed by: ANESTHESIOLOGY

## 2018-11-14 PROCEDURE — A9270 NON-COVERED ITEM OR SERVICE: HCPCS

## 2018-11-14 PROCEDURE — 700111 HCHG RX REV CODE 636 W/ 250 OVERRIDE (IP)

## 2018-11-14 PROCEDURE — 700102 HCHG RX REV CODE 250 W/ 637 OVERRIDE(OP)

## 2018-11-14 PROCEDURE — 160035 HCHG PACU - 1ST 60 MINS PHASE I: Performed by: OTOLARYNGOLOGY

## 2018-11-14 PROCEDURE — 700101 HCHG RX REV CODE 250

## 2018-11-14 PROCEDURE — 502240 HCHG MISC OR SUPPLY RC 0272: Performed by: OTOLARYNGOLOGY

## 2018-11-14 PROCEDURE — C1778 LEAD, NEUROSTIMULATOR: HCPCS | Performed by: OTOLARYNGOLOGY

## 2018-11-14 PROCEDURE — C1767 GENERATOR, NEURO NON-RECHARG: HCPCS | Performed by: OTOLARYNGOLOGY

## 2018-11-14 PROCEDURE — 160009 HCHG ANES TIME/MIN: Performed by: OTOLARYNGOLOGY

## 2018-11-14 PROCEDURE — 160048 HCHG OR STATISTICAL LEVEL 1-5: Performed by: OTOLARYNGOLOGY

## 2018-11-14 PROCEDURE — 160039 HCHG SURGERY MINUTES - EA ADDL 1 MIN LEVEL 3: Performed by: OTOLARYNGOLOGY

## 2018-11-14 PROCEDURE — 501838 HCHG SUTURE GENERAL: Performed by: OTOLARYNGOLOGY

## 2018-11-14 PROCEDURE — 160036 HCHG PACU - EA ADDL 30 MINS PHASE I: Performed by: OTOLARYNGOLOGY

## 2018-11-14 DEVICE — LEAD PERENNIAL FLEX VAGUS NERVE ---MIN PURCHASE 5EA---: Type: IMPLANTABLE DEVICE | Status: FUNCTIONAL

## 2018-11-14 DEVICE — GENERATOR ASPIRE SR VAGUS NERVE: Type: IMPLANTABLE DEVICE | Status: FUNCTIONAL

## 2018-11-14 RX ORDER — AMOXICILLIN 500 MG/1
500 CAPSULE ORAL 3 TIMES DAILY
Qty: 21 CAP | Refills: 0 | Status: SHIPPED | OUTPATIENT
Start: 2018-11-14 | End: 2018-12-17

## 2018-11-14 RX ORDER — OXYCODONE HCL 5 MG/5 ML
5 SOLUTION, ORAL ORAL
Status: COMPLETED | OUTPATIENT
Start: 2018-11-14 | End: 2018-11-14

## 2018-11-14 RX ORDER — KETOROLAC TROMETHAMINE 30 MG/ML
INJECTION, SOLUTION INTRAMUSCULAR; INTRAVENOUS
Status: COMPLETED
Start: 2018-11-14 | End: 2018-11-14

## 2018-11-14 RX ORDER — ONDANSETRON 2 MG/ML
4 INJECTION INTRAMUSCULAR; INTRAVENOUS
Status: COMPLETED | OUTPATIENT
Start: 2018-11-14 | End: 2018-11-14

## 2018-11-14 RX ORDER — LIDOCAINE HYDROCHLORIDE 10 MG/ML
INJECTION, SOLUTION EPIDURAL; INFILTRATION; INTRACAUDAL; PERINEURAL
Status: DISCONTINUED
Start: 2018-11-14 | End: 2018-11-14 | Stop reason: HOSPADM

## 2018-11-14 RX ORDER — ONDANSETRON 4 MG/1
4 TABLET, ORALLY DISINTEGRATING ORAL EVERY 6 HOURS PRN
Qty: 10 TAB | Refills: 1 | Status: SHIPPED | OUTPATIENT
Start: 2018-11-14 | End: 2019-10-28

## 2018-11-14 RX ORDER — LABETALOL HYDROCHLORIDE 5 MG/ML
5 INJECTION, SOLUTION INTRAVENOUS
Status: DISCONTINUED | OUTPATIENT
Start: 2018-11-14 | End: 2018-11-14 | Stop reason: HOSPADM

## 2018-11-14 RX ORDER — HALOPERIDOL 5 MG/ML
1 INJECTION INTRAMUSCULAR
Status: DISCONTINUED | OUTPATIENT
Start: 2018-11-14 | End: 2018-11-14 | Stop reason: HOSPADM

## 2018-11-14 RX ORDER — LIDOCAINE HYDROCHLORIDE AND EPINEPHRINE 10; 10 MG/ML; UG/ML
INJECTION, SOLUTION INFILTRATION; PERINEURAL
Status: DISCONTINUED | OUTPATIENT
Start: 2018-11-14 | End: 2018-11-14 | Stop reason: HOSPADM

## 2018-11-14 RX ORDER — EPINEPHRINE 1 MG/ML
INJECTION INTRAMUSCULAR; INTRAVENOUS; SUBCUTANEOUS
Status: DISCONTINUED
Start: 2018-11-14 | End: 2018-11-14 | Stop reason: HOSPADM

## 2018-11-14 RX ORDER — HYDROMORPHONE HYDROCHLORIDE 1 MG/ML
INJECTION, SOLUTION INTRAMUSCULAR; INTRAVENOUS; SUBCUTANEOUS
Status: COMPLETED
Start: 2018-11-14 | End: 2018-11-14

## 2018-11-14 RX ORDER — SODIUM CHLORIDE, SODIUM LACTATE, POTASSIUM CHLORIDE, CALCIUM CHLORIDE 600; 310; 30; 20 MG/100ML; MG/100ML; MG/100ML; MG/100ML
INJECTION, SOLUTION INTRAVENOUS CONTINUOUS
Status: DISCONTINUED | OUTPATIENT
Start: 2018-11-14 | End: 2018-11-14 | Stop reason: HOSPADM

## 2018-11-14 RX ORDER — HYDROCODONE BITARTRATE AND ACETAMINOPHEN 5; 325 MG/1; MG/1
1-2 TABLET ORAL EVERY 4 HOURS PRN
Qty: 42 TAB | Refills: 0 | Status: SHIPPED | OUTPATIENT
Start: 2018-11-14 | End: 2018-11-21

## 2018-11-14 RX ORDER — SODIUM CHLORIDE, SODIUM LACTATE, POTASSIUM CHLORIDE, CALCIUM CHLORIDE 600; 310; 30; 20 MG/100ML; MG/100ML; MG/100ML; MG/100ML
INJECTION, SOLUTION INTRAVENOUS ONCE
Status: DISCONTINUED | OUTPATIENT
Start: 2018-11-14 | End: 2018-11-14 | Stop reason: HOSPADM

## 2018-11-14 RX ORDER — KETOROLAC TROMETHAMINE 30 MG/ML
30 INJECTION, SOLUTION INTRAMUSCULAR; INTRAVENOUS ONCE
Status: DISCONTINUED | OUTPATIENT
Start: 2018-11-14 | End: 2018-11-14 | Stop reason: HOSPADM

## 2018-11-14 RX ORDER — HYDROMORPHONE HYDROCHLORIDE 1 MG/ML
0.5 INJECTION, SOLUTION INTRAMUSCULAR; INTRAVENOUS; SUBCUTANEOUS
Status: DISCONTINUED | OUTPATIENT
Start: 2018-11-14 | End: 2018-11-14 | Stop reason: HOSPADM

## 2018-11-14 RX ORDER — OXYCODONE HCL 5 MG/5 ML
10 SOLUTION, ORAL ORAL
Status: COMPLETED | OUTPATIENT
Start: 2018-11-14 | End: 2018-11-14

## 2018-11-14 RX ORDER — OXYCODONE HCL 5 MG/5 ML
SOLUTION, ORAL ORAL
Status: COMPLETED
Start: 2018-11-14 | End: 2018-11-14

## 2018-11-14 RX ORDER — LORAZEPAM 2 MG/ML
1 INJECTION INTRAMUSCULAR
Status: DISCONTINUED | OUTPATIENT
Start: 2018-11-14 | End: 2018-11-14 | Stop reason: HOSPADM

## 2018-11-14 RX ORDER — HYDRALAZINE HYDROCHLORIDE 20 MG/ML
5 INJECTION INTRAMUSCULAR; INTRAVENOUS
Status: DISCONTINUED | OUTPATIENT
Start: 2018-11-14 | End: 2018-11-14 | Stop reason: HOSPADM

## 2018-11-14 RX ADMIN — Medication 10 MG: at 13:36

## 2018-11-14 RX ADMIN — HYDROMORPHONE HYDROCHLORIDE 0.5 MG: 1 INJECTION, SOLUTION INTRAMUSCULAR; INTRAVENOUS; SUBCUTANEOUS at 15:52

## 2018-11-14 RX ADMIN — ONDANSETRON 4 MG: 2 INJECTION INTRAMUSCULAR; INTRAVENOUS at 14:07

## 2018-11-14 RX ADMIN — FENTANYL CITRATE 50 MCG: 50 INJECTION, SOLUTION INTRAMUSCULAR; INTRAVENOUS at 13:39

## 2018-11-14 RX ADMIN — FENTANYL CITRATE 25 MCG: 50 INJECTION, SOLUTION INTRAMUSCULAR; INTRAVENOUS at 14:03

## 2018-11-14 RX ADMIN — FENTANYL CITRATE 50 MCG: 50 INJECTION, SOLUTION INTRAMUSCULAR; INTRAVENOUS at 13:31

## 2018-11-14 RX ADMIN — FENTANYL CITRATE 25 MCG: 50 INJECTION, SOLUTION INTRAMUSCULAR; INTRAVENOUS at 13:54

## 2018-11-14 RX ADMIN — FENTANYL CITRATE 50 MCG: 50 INJECTION, SOLUTION INTRAMUSCULAR; INTRAVENOUS at 14:14

## 2018-11-14 RX ADMIN — HYDROMORPHONE HYDROCHLORIDE 0.4 MG: 1 INJECTION, SOLUTION INTRAMUSCULAR; INTRAVENOUS; SUBCUTANEOUS at 15:05

## 2018-11-14 RX ADMIN — KETOROLAC TROMETHAMINE 30 MG: 30 INJECTION, SOLUTION INTRAMUSCULAR at 15:05

## 2018-11-14 RX ADMIN — FENTANYL CITRATE 50 MCG: 50 INJECTION, SOLUTION INTRAMUSCULAR; INTRAVENOUS at 14:52

## 2018-11-14 RX ADMIN — OXYCODONE HYDROCHLORIDE 10 MG: 5 SOLUTION ORAL at 13:36

## 2018-11-14 ASSESSMENT — PAIN SCALES - GENERAL
PAINLEVEL_OUTOF10: 6
PAINLEVEL_OUTOF10: 3
PAINLEVEL_OUTOF10: 3
PAINLEVEL_OUTOF10: 8
PAINLEVEL_OUTOF10: 5
PAINLEVEL_OUTOF10: 8
PAINLEVEL_OUTOF10: 7
PAINLEVEL_OUTOF10: 6
PAINLEVEL_OUTOF10: 7
PAINLEVEL_OUTOF10: 8
PAINLEVEL_OUTOF10: 2
PAINLEVEL_OUTOF10: 8
PAINLEVEL_OUTOF10: 10

## 2018-11-14 NOTE — OR NURSING
"1324  Pt arrived to PACU from OR with Dr. Craig and RN.  Oral airway in place.  Pt maintaining sats well on 5L O2, administered orally through airway.  Left neck and upper chest incisions noted; both w/steri strip closure and both cdi.    1327  Oral airway dc'd  1331  Fentanyl given for severe pain.  Pt constantly reaches for left neck and grimaces.  She is crying and nodding \"yes\" when asked if in pain.  1336  Oxycodone given for longer acting pain relief.  Pt tolerated oral fluids well.  1339  Second dose of fentanyl given.  Ice pack applied to incision sites for comfort.  1354  Third dose of fentanyl given for persistent pain.  Pt continues to cry and brace incisional site.    1403  Fourth dose of fentanyl given for pain  1407  Zofran given for c/o nausea  1414  Fifth dose of fentanyl given for continued stabbing left neck pain.   at bedside.  1452  Sixth dose of fentanyl given by Chante SARGENT.   1505  Dilaudid and Toradol given per orders.  Chante SARGENT rec'd phone order from anesthesia.    1530  Pt reports improvement in pain.  1552  Second dose of dilaudid given for persistent pain.  1625  Pt dressed.  DC instructions discussed with pt and .  Pt meets DC criteria.  Pt and  agree.  Pt voided x 1.1620  1630  PIV dc'd  1633  Pt DC'd home in .    "

## 2018-11-14 NOTE — DISCHARGE INSTRUCTIONS
ACTIVITY: Rest and take it easy for the first 24 hours.  A responsible adult is recommended to remain with you during that time.  It is normal to feel sleepy.  We encourage you to not do anything that requires balance, judgment or coordination.    MILD FLU-LIKE SYMPTOMS ARE NORMAL. YOU MAY EXPERIENCE GENERALIZED MUSCLE ACHES, THROAT IRRITATION, HEADACHE AND/OR SOME NAUSEA.    FOR 24 HOURS DO NOT:  Drive, operate machinery or run household appliances.  Drink beer or alcoholic beverages.   Make important decisions or sign legal documents.    SPECIAL INSTRUCTIONS:     1.  Ok to shower in 24 hours.  You can get the incisions and dressings wet, but pat them dry once out of shower.  NO swimming or baths until approved by your physician.  2.  The steri-strips will fall off on their own (usually in 1-2 weeks).  Do not force them off.      DIET: To avoid nausea, slowly advance diet as tolerated, avoiding spicy or greasy foods for the first day.  Add more substantial food to your diet according to your physician's instructions.  Babies can be fed formula or breast milk as soon as they are hungry.  INCREASE FLUIDS AND FIBER TO AVOID CONSTIPATION.    FOLLOW-UP APPOINTMENT:  A follow-up appointment should be arranged with your doctor as needed.    You should CALL YOUR PHYSICIAN if you develop:  Fever greater than 101 degrees F.  Pain not relieved by medication, or persistent nausea or vomiting.  Excessive bleeding (blood soaking through dressing) or unexpected drainage from the wound.  Extreme redness or swelling around the incision site, drainage of pus or foul smelling drainage.  Inability to urinate or empty your bladder within 8 hours.  Problems with breathing or chest pain.    You should call 911 if you develop problems with breathing or chest pain.  If you are unable to contact your doctor or surgical center, you should go to the nearest emergency room or urgent care center.  Physician's telephone #:  Dr. Ratliff  [phone]    If any questions arise, call your doctor.  If your doctor is not available, please feel free to call the Surgical Center at [phone].  The Center is open Monday through Friday from 7AM to 7PM.  You can also call the HEALTH HOTLINE open 24 hours/day, 7 days/week and speak to a nurse at [phone], or toll free at [phone].    A registered nurse may call you a few days after your surgery to see how you are doing after your procedure.    MEDICATIONS: Resume taking daily medication.  Take prescribed pain medication with food.  If no medication is prescribed, you may take non-aspirin pain medication if needed.  PAIN MEDICATION CAN BE VERY CONSTIPATING.  Take a stool softener or laxative such as senokot, pericolace, or milk of magnesia if needed.    Prescription given for Norco, Zofran, and Amoxicillin.  Last pain medication given at Oxycodone was last given at 1:35pm.  The next time Norco can be taken is at 5:35pm or later.    If your physician has prescribed pain medication that includes Acetaminophen (Tylenol), do not take additional Acetaminophen (Tylenol) while taking the prescribed medication.    Depression / Suicide Risk    As you are discharged from this [facility] facility, it is important to learn how to keep safe from harming yourself.    Recognize the warning signs:  · Abrupt changes in personality, positive or negative- including increase in energy   · Giving away possessions  · Change in eating patterns- significant weight changes-  positive or negative  · Change in sleeping patterns- unable to sleep or sleeping all the time   · Unwillingness or inability to communicate  · Depression  · Unusual sadness, discouragement and loneliness  · Talk of wanting to die  · Neglect of personal appearance   · Rebelliousness- reckless behavior  · Withdrawal from people/activities they love  · Confusion- inability to concentrate     If you or a loved one observes any of these behaviors or  has concerns about self-harm, here's what you can do:  · Talk about it- your feelings and reasons for harming yourself  · Remove any means that you might use to hurt yourself (examples: pills, rope, extension cords, firearm)  · Get professional help from the community (Mental Health, Substance Abuse, psychological counseling)  · Do not be alone:Call your Safe Contact- someone whom you trust who will be there for you.  · Call your local CRISIS HOTLINE 737-0807 or 373-243-6540  · Call your local Children's Mobile Crisis Response Team [place] (764) 213-9000 or www.[employer]  · Call the toll free National Suicide Prevention Hotlines   · National Suicide Prevention Lifeline 973-320-BFFZ (7593)  · National Hope Line Network 800-SUICIDE (045-1447)

## 2018-11-14 NOTE — OP REPORT
DATE OF OPERATION: 11/14/2018     PREOPERATIVE DIAGNOSIS: Intractable seizures    POSTOPERATIVE DIAGNOSIS: Same    PROCEDURE: Vagal Nerve Stimulator Implant and Programming.     ATTENDING: Nelsy Ratliff MD     ANESTHESIA:  Anesthesiologist: Antonio Craig M.D.     COMPLICATIONS: None.     SPECIMENS: None    PROCEDURE IN DETAIL:  The patient was appropriately identified and taken to    the operating room where he was lying in spine position.  General anesthesia    was induced and endotracheal tube was placed.  The patient was then positioned   with a shoulder roll under the left shoulder.  The head extended slightly to    the right.  Lidocaine 1% with epinephrine was injected to the neck; a total of   about 3-4 mL was used.  This was also injected into the upper chest.  He was    completely prepped and draped in sterile fashion.  At this time, normal    landmarks were marked out.  A marking pen was used to dequan incision just below   the left clavicle and the chest as well as in the midline neck crease.  A 15    blade was then used taken to cut through the skin and the soft tissue in the    chest.  This was repeated in the upper neck with a horizontal incision about 4   cm in length, taken down through the soft tissue and in the platysma.  Any    bleeding at this time was stopped using monopolar cautery.  The pocket    inferiorly was then elevated down to the pectoralis muscle and taken down    approximately 5-6 cm in square using the monopolar cautery.  Attention was    turned to the upper neck.  Subplatysmal flaps were then elevated inferiorly    and superiorly approximately 2 cm inferiorly and superiorly along the SCM.     Self-retaining retractor was then placed.  The sternocleidomastoid was then    grasped.  The overlying fascia was incised and dissected using Metzenbaum    scissors.  This was taken down on to the jugular vein..  The jugular vein was    identified and careful dissection of the fascia off the  vein was cleaned off    using Metzenbaum scissors and then bipolar cautery to divide this fascia.     Circumferential dissection was taken around the jugular vein and a vessel loop   was placed inferiorly and superiorly on the vein.  This was then retracted    laterally.  Further dissection of the vagus nerve could be seen just lateral    to the carotid artery and posterior to the jugular vein.  This was grasped    using a smooth Antoine and then the surrounding fascia was dissected off.  A    total of 2 cm of the vein was isolated to clean off.  There was a nerve    running over the top of the carotid that appeared to be consistent with ansa cervicalis. This was released off the vagus nerve so that   enough length could be exposed.  Once completed, further retractors were    placed and positioned.  Weitlaner was placed in position along with the    sternocleidomastoid in the neck.  The trocar was then brought in the field and   dissected from the upper to lower incision.  Once the middle portion of the    trocar was removed, the vagal nerve stimulator leads were placed in the tube    and then pulled through the neck.  The leads were then placed along the vagus    nerve using a small right angle and smooth Antoine first placed in the inferior   loops around the nerve, the middle loops and the upper loops without    difficulty.  The battery was then placed in the lower lead and tested for good   working activity.  Once completed, the lead was then pulled up further into    the neck to make a loop down up and down.  The securing boots were placed into   position on the anterior loop and 1 on the posterior loop and secured to the    sternocleidomastoid using a 4-0 nylon.  The fascia overlying the SCM and the    anterior neck were then closed in interrupted fashion using a 4-0 Vicryl.  A    stay suture was placed through battery of the 3-0 silk and then placed the    pocket and tightened down.  The battery was placed in  the pocket as well as    the excess loops.  The deep tissues were closed of the strap muscles and the    platysma using interrupted 4-0 Vicryl as well as the subcutaneous tissues.  A    running 5-0 fast was used to close the subcuticular skin in both the inferior    and superior incisions after which the area was cleaned off.  Once again, the    stimulator was checked and the heart rate monitor was verified showing once    again good working activity.  Skin was cleaned off.  Mastisol and    Steri-Strips were placed.  He was unprepped and draped, awakened, extubated    and returned to recovery in stable satisfactory condition.       ____________________________________     Nelsy Ratliff MD

## 2018-11-14 NOTE — OR NURSING
1450 Report from Kita SARGENT.    1452 Pt medicated with IV Fentanyl per order.   1500 Call to Dr. Craig, pt still having a great deal of pain, per MD, orders for Dilaudid IV and one time dose of Toradol IV.    1504 Pt medicated with 0.5 mg of IV dilaudid.   1505 Pt medicated with 30 mg of Toradol per order.   1508 Report to Kita SARGENT.

## 2018-11-20 ENCOUNTER — OFFICE VISIT (OUTPATIENT)
Dept: NEUROLOGY | Facility: MEDICAL CENTER | Age: 53
End: 2018-11-20
Payer: COMMERCIAL

## 2018-11-20 VITALS
DIASTOLIC BLOOD PRESSURE: 76 MMHG | WEIGHT: 168.9 LBS | HEIGHT: 69 IN | RESPIRATION RATE: 18 BRPM | OXYGEN SATURATION: 92 % | HEART RATE: 72 BPM | TEMPERATURE: 97.7 F | BODY MASS INDEX: 25.02 KG/M2 | SYSTOLIC BLOOD PRESSURE: 114 MMHG

## 2018-11-20 DIAGNOSIS — Z96.89 STATUS POST PLACEMENT OF VNS (VAGUS NERVE STIMULATION) DEVICE: ICD-10-CM

## 2018-11-20 DIAGNOSIS — G40.919 INTRACTABLE SEIZURE DISORDER (HCC): ICD-10-CM

## 2018-11-20 PROCEDURE — 99214 OFFICE O/P EST MOD 30 MIN: CPT | Performed by: NURSE PRACTITIONER

## 2018-11-20 RX ORDER — LORAZEPAM 1 MG/1
TABLET ORAL
Qty: 20 TAB | Refills: 0 | Status: SHIPPED
Start: 2018-11-20 | End: 2018-12-20

## 2018-11-20 ASSESSMENT — ENCOUNTER SYMPTOMS
SEIZURES: 1
DEPRESSION: 0
ABDOMINAL PAIN: 0
DIARRHEA: 0
DOUBLE VISION: 0
VOMITING: 0
COUGH: 0
SORE THROAT: 0
HEADACHES: 0
HEARTBURN: 1
NAUSEA: 0

## 2018-11-20 NOTE — PROGRESS NOTES
"Subjective:      Sarita Loera is a 52 y.o. female who presents with Follow-Up (VNS - post implant)          HPI  Here with  today.    11/14/18 VNS implant per Dr Ratliff.  Has been taking Norco with Excedrin Migraine.    She is describing left neck pain, very bruised.    Did have some small seizures on Saturday and Sunday morning.    Titrated Fycompa up to 3mg qhs total, then weaned back off.  Did take a few of them last week then stopped.  She was a \"big bitch\" with Fycompa.  It did not make her seizures worse.    10/22/18 About one month ago she had a GTC with chipping a tooth/lip repair needed.  Had a GTC in the morning 0330, again 0530, GTC 0730.    Implanted 11/14/2018, Dr Ratliff  [employer], [id number]    Vagus Nerve Stimulator interrogated:  Output Current (milliamps): 0.0-- 0.25  Signal Frequency (Hertz): 30  Pulse Width (Microseconds): 500  Signal On Time (seconds): 30  Signal Off Time (minutes): 5.0  Magnet Current (milliamps): 0.0-- 0.50  Magnet On Time (seconds): 500  Magnet Pulse Width (microseconds): 60    Autostim: 0.0-- 0.3750  Pulse Width: 500  Signal On Time: 30    Tachy Det: ON  Heartbeat: 4  Threshold: 40%    Imp Value: 2837 Ohms  IFI NO    Current Outpatient Prescriptions   Medication Sig Dispense Refill   • Acetaminophen (TYLENOL CHILDRENS PO) Take  by mouth.     • amoxicillin (AMOXIL) 500 MG Cap Take 1 Cap by mouth 3 times a day. 21 Cap 0   • HYDROcodone-acetaminophen (NORCO) 5-325 MG Tab per tablet Take 1-2 Tabs by mouth every four hours as needed for up to 7 days. 42 Tab 0   • ondansetron (ZOFRAN ODT) 4 MG TABLET DISPERSIBLE Take 1 Tab by mouth every 6 hours as needed. 10 Tab 1   • Aspirin-Acetaminophen-Caffeine (EXCEDRIN MIGRAINE PO) Take 1 Tab by mouth every four hours as needed.     • LORazepam (ATIVAN) 1 MG Tab Take 1/2-1 tablet PO PRN breakthrough seizures.  Do not exceed more than 2 tablets in 24 hours unless directed otherwise by physician.     • omeprazole (PRILOSEC) 40 " "MG delayed-release capsule 1 po QD     • LamoTRIgine (LAMICTAL XR) 300 MG TABLET SR 24 HR Take 1 mg by mouth 2 Times a Day. (Patient taking differently: Take 300 mg by mouth 2 Times a Day.) 180 Tab 3     No current facility-administered medications for this visit.          Review of Systems   HENT: Negative for hearing loss, nosebleeds and sore throat.         No recent head injury.   Eyes: Negative for double vision.        No new loss of vision.   Respiratory: Negative for cough.         No recent lung infections.   Cardiovascular: Negative for chest pain.   Gastrointestinal: Positive for heartburn. Negative for abdominal pain, diarrhea, nausea and vomiting.   Genitourinary: Negative.    Musculoskeletal: Positive for myalgias.   Skin: Negative.    Neurological: Positive for seizures. Negative for headaches.   Endo/Heme/Allergies:        No history of endocrine dysfunction.  No new problems.   Psychiatric/Behavioral: Positive for memory loss. Negative for depression. The patient is nervous/anxious.         No recent mood changes.          Objective:     /76 (BP Location: Right arm, Patient Position: Sitting, BP Cuff Size: Adult)   Pulse 72   Temp 36.5 °C (97.7 °F) (Temporal)   Resp 18   Ht 1.753 m (5' 9\")   Wt 76.6 kg (168 lb 14.4 oz)   LMP 11/09/2008 (Approximate)   SpO2 92%   BMI 24.94 kg/m²      Physical Exam   Constitutional: She is oriented to person, place, and time. She appears well-developed and well-nourished.   HENT:   Head: Normocephalic and atraumatic.   Nose: Nose normal.   Wears glasses     Neck: Normal range of motion.   Discomfort with VNS implant, significant bruising.   Cardiovascular: Normal rate and regular rhythm.    Pulmonary/Chest: Effort normal.   Neurological: She is alert and oriented to person, place, and time. She exhibits normal muscle tone. Gait normal.   No observable changes in neurologic status.  See initial new patient examination for details.    Skin: Skin is warm. "           Assessment/Plan:     Seizure disorder:  History of events suggestive of complex partial seizures with secondary generalization since 2004.  Only AED prescribed is lamotrigine and Lamictal.     Increase in seizures in the past few months for no apparent reason.     INTERPRETATION:  This is an abnormal video EEG recording in the awake, and drowsy state(s). Frequent left anterior temporal sharps and intermittent left anterior temporal slowing. The findings increase risk for seizures and suggest underlying area of cortical irritability and structural abnormality. Clinical and radiological correlation is recommended.     1) Will continue LTG ER 300mg BID.  2) Consider melatonin supplement at night.    Counseled regarding VNS surgery and complications of pain and bruising.    VNS turned on by setting output current, magnet current, autostim mode increased.  Educated regarding magnet usage and anticipatory guidance with the VNS and healing.    Return for follow-up in two weeks-- the family has an extensive travel time so will attempt to accommodate X2-3 setting increases in one day.     I spent 35+ minutes with this patient, over fifty percent was spent counseling patient on their condition, best management practices, reviewing test results and risks and benefits of treatment.

## 2018-11-27 ENCOUNTER — TELEPHONE (OUTPATIENT)
Dept: NEUROLOGY | Facility: MEDICAL CENTER | Age: 53
End: 2018-11-27

## 2018-11-27 NOTE — TELEPHONE ENCOUNTER
Patient's dentist office phoned today, patient has an appointment with this next week. They just wanted to double check that it is safe for patient to have a cleaning and any potential work that results, due to recent VNS implant and seizure activity. Could you please write up a short letter of clearance and or any advisements that you feel are necessary.     [facility]  Phone: [phone] ([place])  Fax: [phone]

## 2018-11-27 NOTE — LETTER
November 28, 2018       Patient: Sarita Loera   YOB: 1965   Date of Visit: 11/27/2018         To Whom It May Concern:    It is my medical opinion that Sarita Loera is cleared neurologically for dental cleaning and any potential dental intervention necessary.  Due to the recent Vagus Nerve Stimulator (VNS) implant, she is restricted from MRI.     If you have any questions or concerns, please don't hesitate to call [phone]          Sincerely,          GONZALO Turner.  Electronically Signed

## 2018-11-28 PROBLEM — Z96.89 STATUS POST PLACEMENT OF VNS (VAGUS NERVE STIMULATION) DEVICE: Status: ACTIVE | Noted: 2018-11-28

## 2018-11-28 ASSESSMENT — ENCOUNTER SYMPTOMS
MEMORY LOSS: 1
MYALGIAS: 1
NERVOUS/ANXIOUS: 1

## 2018-12-17 ENCOUNTER — OFFICE VISIT (OUTPATIENT)
Dept: NEUROLOGY | Facility: MEDICAL CENTER | Age: 53
End: 2018-12-17
Payer: COMMERCIAL

## 2018-12-17 VITALS
DIASTOLIC BLOOD PRESSURE: 72 MMHG | OXYGEN SATURATION: 94 % | WEIGHT: 175 LBS | TEMPERATURE: 97.7 F | BODY MASS INDEX: 25.92 KG/M2 | RESPIRATION RATE: 16 BRPM | HEIGHT: 69 IN | SYSTOLIC BLOOD PRESSURE: 112 MMHG | HEART RATE: 57 BPM

## 2018-12-17 DIAGNOSIS — F41.9 ANXIETY: ICD-10-CM

## 2018-12-17 DIAGNOSIS — Z96.89 STATUS POST PLACEMENT OF VNS (VAGUS NERVE STIMULATION) DEVICE: ICD-10-CM

## 2018-12-17 DIAGNOSIS — G40.919 INTRACTABLE SEIZURE DISORDER (HCC): ICD-10-CM

## 2018-12-17 PROCEDURE — 99215 OFFICE O/P EST HI 40 MIN: CPT | Mod: 25 | Performed by: NURSE PRACTITIONER

## 2018-12-17 PROCEDURE — 95974 PR COMPLEX CRANIAL NEUROSTIM,1ST HOUR: CPT | Performed by: NURSE PRACTITIONER

## 2018-12-17 ASSESSMENT — ENCOUNTER SYMPTOMS
SORE THROAT: 0
MYALGIAS: 1
CONSTITUTIONAL NEGATIVE: 1
MEMORY LOSS: 1
NERVOUS/ANXIOUS: 1
NAUSEA: 0
COUGH: 0
ABDOMINAL PAIN: 0
DIARRHEA: 0
DOUBLE VISION: 0
SEIZURES: 1
HEADACHES: 0
VOMITING: 0
DEPRESSION: 0

## 2018-12-17 NOTE — PROGRESS NOTES
"Subjective:      Sarita Loera is a 53 y.o. female who presents with Follow-Up (Intractable seizure disorder - VNS check)            HPI Here with  today.  Reports that she has had \"a few\" seizures since our last appointment.  She was very nervous yesterday so last used the magnet yesterday.     11/14/18 VNS implant per Dr Ratliff.  Has been taking Norco with Excedrin Migraine.     Nicely healed from the surgical implantation.      10/22/18 About one month ago she had a GTC with chipping a tooth/lip repair needed.  Had a GTC in the morning 0330, again 0530, GTC 0730.     Implanted 11/14/2018, Dr Ratliff  [employer], [id number]     Vagus Nerve Stimulator interrogated:  Output Current (milliamps): 0.25-- 0.375--0.5  Signal Frequency (Hertz): 30  Pulse Width (Microseconds): 500  Signal On Time (seconds): 30  Signal Off Time (minutes): 5.0  Magnet Current (milliamps): 0.50--0.625--0.750  Magnet On Time (seconds): 500  Magnet Pulse Width (microseconds): 60     Autostim: 0.3750-- 0.500-- 0.625  Pulse Width: 500  Signal On Time: 30    Tachy Det: ON  Heartbeat: 4  Threshold: 40%--50%     Imp Value:3405 Ohms  IFI NO    Autostim: 42%  Magnet: 0  Normal stim: 58%    Describing discomfort with a \"nerve pain\" shooting up into her left ear.    Review of Systems   Constitutional: Negative.    HENT: Negative for hearing loss, nosebleeds and sore throat.         No recent head injury.   Eyes: Negative for double vision.        No new loss of vision.   Respiratory: Negative for cough.         No recent lung infections.   Cardiovascular: Negative for chest pain.   Gastrointestinal: Negative for abdominal pain, diarrhea, nausea and vomiting.   Genitourinary: Negative.    Musculoskeletal: Positive for myalgias.   Skin: Negative.    Neurological: Positive for seizures. Negative for headaches.   Endo/Heme/Allergies:        No history of endocrine dysfunction.  No new problems.   Psychiatric/Behavioral: Positive for memory loss. " Negative for depression. The patient is nervous/anxious.         No recent mood changes.          Objective:     LMP 11/09/2008 (Approximate)      Physical Exam   Constitutional: She is oriented to person, place, and time. She appears well-developed and well-nourished.   HENT:   Head: Normocephalic and atraumatic.   Eyes: Pupils are equal, round, and reactive to light.   Neck: Normal range of motion.   Cardiovascular: Normal rate and regular rhythm.    Pulmonary/Chest: Effort normal.   Neurological: She is alert and oriented to person, place, and time. She exhibits normal muscle tone. Gait normal.   No observable changes in neurologic status.  See initial new patient examination for details.    Skin: Skin is warm.          Assessment/Plan:       Seizure disorder:  History of events suggestive of complex partial seizures with secondary generalization since 2004.  Only AED prescribed is lamotrigine and Lamictal.     INTERPRETATION:  This is an abnormal video EEG recording in the awake, and drowsy state(s). Frequent left anterior temporal sharps and intermittent left anterior temporal slowing. The findings increase risk for seizures and suggest underlying area of cortical irritability and structural abnormality. Clinical and radiological correlation is recommended.     1) Will continue LTG ER 300mg BID.  2) Consider melatonin supplement at night.     VNS settings adjusted including output current, magnet current, autostim mode increased.  Autostim percentage reduced as well. Educated regarding magnet usage and anticipatory guidance with the VNS and healing.     Return for follow-up in two weeks-- the family has an extensive travel time so will attempt to accommodate X2-3 setting increases in one day.      I spent 35+ minutes with this patient, over fifty percent was spent counseling patient on their condition, best management practices, reviewing test results and risks and benefits of treatment.    Addendum:  Vagus  Nerve Stimulator interrogated:  Output Current (milliamps): 0.5-- 0.625-- 0.75/ 0.875-- 1.0  Signal Frequency (Hertz): 30-- 20  Pulse Width (Microseconds): 500-- 250  Signal On Time (seconds): 30  Signal Off Time (minutes): 5.0  Magnet Current (milliamps): 0.750-- 0.875 -- 1.0/1.125--1.25  Magnet On Time (seconds): 500  Magnet Pulse Width (microseconds): 60     Autostim: 0.625 --0.750 --0.875/ 1.0-- 1.125  Pulse Width: 500  Signal On Time: 30    Tachy Det: ON  Heartbeat: 4  Threshold: 50%

## 2019-03-19 ENCOUNTER — OFFICE VISIT (OUTPATIENT)
Dept: NEUROLOGY | Facility: MEDICAL CENTER | Age: 54
End: 2019-03-19
Payer: COMMERCIAL

## 2019-03-19 VITALS
WEIGHT: 166 LBS | DIASTOLIC BLOOD PRESSURE: 60 MMHG | TEMPERATURE: 98.2 F | HEART RATE: 80 BPM | RESPIRATION RATE: 16 BRPM | HEIGHT: 69 IN | OXYGEN SATURATION: 98 % | SYSTOLIC BLOOD PRESSURE: 100 MMHG | BODY MASS INDEX: 24.59 KG/M2

## 2019-03-19 DIAGNOSIS — G40.919 INTRACTABLE SEIZURE DISORDER (HCC): ICD-10-CM

## 2019-03-19 DIAGNOSIS — Z96.89 STATUS POST PLACEMENT OF VNS (VAGUS NERVE STIMULATION) DEVICE: ICD-10-CM

## 2019-03-19 PROCEDURE — 95976 ALYS SMPL CN NPGT PRGRMG: CPT | Performed by: NURSE PRACTITIONER

## 2019-03-19 PROCEDURE — 99214 OFFICE O/P EST MOD 30 MIN: CPT | Mod: 25 | Performed by: NURSE PRACTITIONER

## 2019-03-19 RX ORDER — ESOMEPRAZOLE MAGNESIUM 40 MG/1
CAPSULE, DELAYED RELEASE ORAL
COMMUNITY
End: 2019-03-19

## 2019-03-19 RX ORDER — METHOCARBAMOL 750 MG/1
TABLET, FILM COATED ORAL
COMMUNITY
End: 2019-03-19

## 2019-03-19 RX ORDER — HYDROCODONE BITARTRATE AND ACETAMINOPHEN 10; 325 MG/1; MG/1
TABLET ORAL
COMMUNITY
End: 2019-03-19

## 2019-03-19 RX ORDER — LAMOTRIGINE 200 MG/1
TABLET ORAL
COMMUNITY
End: 2019-03-19

## 2019-03-19 RX ORDER — GABAPENTIN 300 MG/1
CAPSULE ORAL
COMMUNITY
End: 2019-03-19

## 2019-03-19 RX ORDER — VARENICLINE TARTRATE 0.5 MG/1
TABLET, FILM COATED ORAL
COMMUNITY
End: 2019-03-19

## 2019-03-19 RX ORDER — LORAZEPAM 1 MG/1
TABLET ORAL
COMMUNITY
End: 2019-07-31 | Stop reason: SDUPTHER

## 2019-03-19 RX ORDER — METHOCARBAMOL 500 MG/1
TABLET, FILM COATED ORAL
COMMUNITY
End: 2019-03-19

## 2019-03-19 RX ORDER — LORAZEPAM 1 MG/1
TABLET ORAL
COMMUNITY
End: 2019-03-19

## 2019-03-19 RX ORDER — OXYCODONE AND ACETAMINOPHEN 10; 325 MG/1; MG/1
TABLET ORAL
COMMUNITY
End: 2019-03-19

## 2019-03-19 RX ORDER — LAMOTRIGINE 300 MG/1
TABLET, EXTENDED RELEASE ORAL
COMMUNITY
Start: 2018-03-05 | End: 2019-06-19

## 2019-03-19 RX ORDER — OMEPRAZOLE 40 MG/1
CAPSULE, DELAYED RELEASE ORAL
COMMUNITY
End: 2019-03-19

## 2019-03-19 RX ORDER — OXYCODONE HCL 20 MG/1
TABLET, FILM COATED, EXTENDED RELEASE ORAL
COMMUNITY
End: 2019-03-19

## 2019-03-19 RX ORDER — ZOLPIDEM TARTRATE 10 MG/1
TABLET ORAL
COMMUNITY
End: 2019-03-19

## 2019-03-19 RX ORDER — LAMOTRIGINE 300 MG/1
TABLET, EXTENDED RELEASE ORAL
COMMUNITY
End: 2019-03-19

## 2019-03-19 RX ORDER — NABUMETONE 500 MG/1
TABLET, FILM COATED ORAL
COMMUNITY
End: 2019-03-19

## 2019-03-19 RX ORDER — ALBUTEROL SULFATE 90 UG/1
2 AEROSOL, METERED RESPIRATORY (INHALATION)
COMMUNITY
Start: 2018-05-15 | End: 2019-10-28

## 2019-03-19 RX ORDER — TRAZODONE HYDROCHLORIDE 100 MG/1
TABLET ORAL
COMMUNITY
End: 2019-03-19

## 2019-03-19 RX ORDER — OXYCODONE HYDROCHLORIDE 10 MG/1
TABLET ORAL
COMMUNITY
End: 2019-03-19

## 2019-03-19 RX ORDER — ONDANSETRON 4 MG/1
TABLET, ORALLY DISINTEGRATING ORAL
COMMUNITY
End: 2019-03-19

## 2019-03-19 RX ORDER — NICOTINE 21 MG/24HR
1 PATCH, TRANSDERMAL 24 HOURS TRANSDERMAL
COMMUNITY
Start: 2018-06-05 | End: 2019-06-19

## 2019-03-19 RX ORDER — LORAZEPAM 0.5 MG/1
TABLET ORAL
COMMUNITY
End: 2019-03-19

## 2019-03-19 RX ORDER — METHYLPREDNISOLONE 4 MG/1
TABLET ORAL
COMMUNITY
End: 2019-03-19

## 2019-03-19 ASSESSMENT — PATIENT HEALTH QUESTIONNAIRE - PHQ9
CLINICAL INTERPRETATION OF PHQ2 SCORE: 1
5. POOR APPETITE OR OVEREATING: 2 - MORE THAN HALF THE DAYS
SUM OF ALL RESPONSES TO PHQ QUESTIONS 1-9: 7

## 2019-03-19 ASSESSMENT — ENCOUNTER SYMPTOMS
DOUBLE VISION: 0
NAUSEA: 0
MYALGIAS: 1
COUGH: 0
SEIZURES: 1
DIARRHEA: 0
HEADACHES: 0
DEPRESSION: 0
SORE THROAT: 0
MEMORY LOSS: 1
NERVOUS/ANXIOUS: 1
ABDOMINAL PAIN: 0
VOMITING: 0

## 2019-03-19 NOTE — PROGRESS NOTES
Subjective:      Sarita Loera is a 53 y.o. female who presents with Follow-Up (Intractable seizure disorder )            HPI   Here with  today.    Has had some seizures, reports a cluster of three in one night.  Of note, had one approximately at 5am and then her  will keep her awake for a few hours then provide ativan and have her take a long nap.    She is very obsessed about timing the VNS on/off cycle.  She does not like the change in her voice.    11/14/18 VNS implant per Dr Ratliff.  [employer], [id number]     Vagus Nerve Stimulator interrogated:  Output Current (milliamps): 1.00-- 1.25-- 1.50  Signal Frequency (Hertz): 20  Pulse Width (Microseconds): 250  Signal On Time (seconds): 30  Signal Off Time (minutes): 5.0  Magnet Current (milliamps): 1.25--1.50-- 1.75  Magnet On Time (seconds): 500  Magnet Pulse Width (microseconds): 60     Autostim: 1.125 --1.375-- 1.625  Pulse Width: 500  Signal On Time: 30    Tachy Det: ON  Heartbeat: 4  Threshold: 50%-- 60%     Imp Value:3337 ohms  IFI NO     Autostim: 35%  Normal stim: 65%      Current Outpatient Prescriptions   Medication Sig Dispense Refill   • LORazepam (ATIVAN) 1 MG Tab Ativan 1 mg tablet   Take 1 tablet 3 times a day by oral route.     • ipratropium-albuterol (COMBIVENT RESPIMAT)  MCG/ACT Aero Soln      • ACETAMINOPHEN-CODEINE #2 PO acetaminophen 300 mg-codeine 30 mg tablet     • albuterol 108 (90 Base) MCG/ACT Aero Soln inhalation aerosol 2 Puffs by Nebulization route.     • Acetaminophen (TYLENOL CHILDRENS PO) Take  by mouth.     • ondansetron (ZOFRAN ODT) 4 MG TABLET DISPERSIBLE Take 1 Tab by mouth every 6 hours as needed. 10 Tab 1   • omeprazole (PRILOSEC) 40 MG delayed-release capsule 1 po QD     • LamoTRIgine (LAMICTAL XR) 300 MG TABLET SR 24 HR Take 1 mg by mouth 2 Times a Day. (Patient taking differently: Take 300 mg by mouth 2 Times a Day.) 180 Tab 3   • LORazepam (ATIVAN) 0.5 MG Tab Ativan 0.5 mg tablet   Take 1 tablet  every 6 hours by oral route.     • varenicline (CHANTIX) 0.5 MG tablet Chantix Starting Month Box 0.5 mg (11)-1 mg (42) tablets in dose pack   Take 1 tablet by oral route as directed.     • zolpidem (AMBIEN) 10 MG Tab Ambien 10 mg tablet   Take 1 tablet every day by oral route.     • Coral Calcium 1000 (390 Ca) MG Tab Coral Calcium   1 tab twice a day     • perampanel (FYCOMPA) 2 MG Tab tablet Fycompa 2 mg tablet     • gabapentin (NEURONTIN) 300 MG Cap gabapentin 300 mg capsule     • gabapentin (NEURONTIN) 300 MG Cap gabapentin 300 mg capsule   Take 1 capsule twice a day by oral route.   patient stopped taking on 2/02/15.     • methocarbamol (ROBAXIN) 500 MG Tab methocarbamol 500 mg tablet     • methocarbamol (ROBAXIN) 500 MG Tab methocarbamol 500 mg tablet   Take 1 tablet every 6 hours by oral route.     • methocarbamol (ROBAXIN) 750 MG Tab methocarbamol 750 mg tablet     • methocarbamol (ROBAXIN) 750 MG Tab methocarbamol 750 mg tablet   Take 1 tablet every 6-8 hours by oral route.     • methylPREDNISolone (MEDROL) 4 MG Tab methylprednisolone 4 mg tablets in a dose pack     • MULTIPLE VITAMINS-MINERALS ER PO chewable - 2 tabs daily     • Multiple Vitamins-Minerals (MULTIVITAMIN ADULT PO) multivitamin   chewable - 2 tabs daily     • Aspirin-Acetaminophen-Caffeine (EXCEDRIN MIGRAINE PO) Take 1 Tab by mouth every four hours as needed.       No current facility-administered medications for this visit.        Review of Systems   Constitutional: Positive for malaise/fatigue.   HENT: Negative for hearing loss, nosebleeds and sore throat.         No recent head injury.   Eyes: Negative for double vision.        No new loss of vision.   Respiratory: Negative for cough.         No recent lung infections.   Cardiovascular: Negative for chest pain.   Gastrointestinal: Negative for abdominal pain, diarrhea, nausea and vomiting.   Genitourinary: Negative.    Musculoskeletal: Positive for myalgias.   Skin: Negative.   "  Neurological: Positive for seizures. Negative for headaches.   Endo/Heme/Allergies:        No history of endocrine dysfunction.  No new problems.   Psychiatric/Behavioral: Positive for memory loss. Negative for depression. The patient is nervous/anxious.         No recent mood changes.          Objective:     /60 (BP Location: Left arm, Patient Position: Sitting, BP Cuff Size: Adult)   Pulse 80   Temp 36.8 °C (98.2 °F) (Temporal)   Resp 16   Ht 1.753 m (5' 9.02\")   Wt 75.3 kg (166 lb)   LMP 11/09/2008 (Approximate)   SpO2 98%   BMI 24.50 kg/m²      Physical Exam   Constitutional: She is oriented to person, place, and time. She appears well-developed and well-nourished.   HENT:   Head: Normocephalic and atraumatic.   Eyes: EOM are normal.   Neck: Normal range of motion.   Cardiovascular: Normal rate and regular rhythm.    Pulmonary/Chest: Effort normal.   Neurological: She is alert and oriented to person, place, and time. She exhibits normal muscle tone. Gait normal.   No observable changes in neurologic status.  See initial new patient examination for details.    Skin: Skin is warm.   Psychiatric: She has a normal mood and affect.               Assessment/Plan:     Seizure disorder:  History of events suggestive of complex partial seizures with secondary generalization since 2004.  Only AED prescribed is lamotrigine and Lamictal.     INTERPRETATION:  This is an abnormal video EEG recording in the awake, and drowsy state(s). Frequent left anterior temporal sharps and intermittent left anterior temporal slowing. The findings increase risk for seizures and suggest underlying area of cortical irritability and structural abnormality. Clinical and radiological correlation is recommended.     1) Will continue LTG ER 300mg BID.  2) Consider melatonin supplement at night.     VNS settings adjusted including output current, magnet current, autostim mode increased.  Autostim percentage reduced as well. Educated " regarding magnet usage and anticipatory guidance with the VNS and healing.     Return for follow-up in 3 months.     I spent 35+ minutes with this patient, over fifty percent was spent counseling patient on their condition, best management practices, reviewing test results and risks and benefits of treatment.     Dosing parameters were changed with initial appointment and then again at 1250.  Will return tomorrow if she wishes to have the autostimulation mode turned off.    EDUCATION AND COUNSELING:  -Education was provided to the patient and/or family regarding diagnosis and prognosis. The chronic and unpredictable nature of the condition was discussed. There is increased risk for additional events, which may carry potential for significant injuries and death.    -We reviewed the current antiepileptic regimen. Potential side effects of antiepileptics were discussed at length, including but no limited to: hypersensitivity reactions (rash and others, some of which can be fatal), visual field changes (some of which may be irreversible), glaucoma, diplopia, kidney stones, osteopenia/osteoporosis/bone fractures, hyperthermia/anhydrosis, tremors, ataxia, dizziness, fatigue, increased risk for falls, cardiac arrhythmias/syncope, gastrointestinal (hepatitis, pancreatitis, gastritis, ulcers), gingival hypertrophy, drowsiness, sedation, anxiety/nervousness, increased risk for suicide, increased risk for depression, and psychosis. We reviewed drug-drug interactions and their potential effect on seizure control and medication side effects.    -Patient/family educated on SUDEP (Sudden Death in Epilepsy). Counseling was provided on the importance of strict medication and follow up compliance. The patient understands the risks associated with non-adherence with the medical plan as outlined, including but not limited to an increased risk for breakthrough seizures, which may contribute to injuries, disability, status epilepticus,  and even death.    -Counseling was also provided on potential effects of alcohol and other drugs, which may lower seizure threshold and/or affect the metabolism of antiepileptic drugs. I recommend avoidance of alcohol and illegal drugs.  -Recommend proper hydration, regular exercise, proper sleep hygiene (7-8 hrs of overnight sleep, avoid sleep deprivation).    -I have made the patient aware of mandatory reporting required by the law in the [facility] regarding episodes of seizures, loss of consciousness, and/or alteration of awareness. The patient and family are responsible for reporting events to the DMV, instructions were provided. The patient verbalized understanding and states she has not been driving. Other seizure precautions were discussed at length, including no diving, no skydiving, no unsupervised swimming, no Jacuzzi or bathing in bathtubs.        Pt agrees with plan.

## 2019-06-17 ENCOUNTER — OFFICE VISIT (OUTPATIENT)
Dept: NEUROLOGY | Facility: MEDICAL CENTER | Age: 54
End: 2019-06-17
Payer: COMMERCIAL

## 2019-06-17 VITALS
HEART RATE: 82 BPM | WEIGHT: 166 LBS | DIASTOLIC BLOOD PRESSURE: 68 MMHG | SYSTOLIC BLOOD PRESSURE: 122 MMHG | BODY MASS INDEX: 24.59 KG/M2 | RESPIRATION RATE: 16 BRPM | HEIGHT: 69 IN | OXYGEN SATURATION: 98 %

## 2019-06-17 DIAGNOSIS — G40.919 INTRACTABLE SEIZURE DISORDER (HCC): ICD-10-CM

## 2019-06-17 DIAGNOSIS — F41.1 ANXIOUS REACTION: ICD-10-CM

## 2019-06-17 DIAGNOSIS — Z96.89 STATUS POST PLACEMENT OF VNS (VAGUS NERVE STIMULATION) DEVICE: ICD-10-CM

## 2019-06-17 DIAGNOSIS — G47.9 SLEEP DISORDER: ICD-10-CM

## 2019-06-17 PROCEDURE — 99214 OFFICE O/P EST MOD 30 MIN: CPT | Mod: 25 | Performed by: NURSE PRACTITIONER

## 2019-06-17 PROCEDURE — 95971 ALYS SMPL SP/PN NPGT W/PRGRM: CPT | Performed by: NURSE PRACTITIONER

## 2019-06-17 RX ORDER — LORAZEPAM 1 MG/1
TABLET ORAL
Qty: 30 TAB | Refills: 0 | Status: SHIPPED
Start: 2019-06-17 | End: 2019-07-17

## 2019-06-17 RX ORDER — TRAZODONE HYDROCHLORIDE 100 MG/1
50-100 TABLET ORAL
Qty: 30 TAB | Refills: 3 | Status: SHIPPED | OUTPATIENT
Start: 2019-06-17 | End: 2019-10-02 | Stop reason: SDUPTHER

## 2019-06-17 NOTE — PROGRESS NOTES
Subjective:      Sarita Loera is a 53 y.o. female who presents with Follow-Up (Status post placement of VNS (vagus nerve stimulation) device)        Here with  today.    HPI  They are very concerned for a variety of issues.  Report that she is having more seizures.  Her VNS incision areas have healed but complains of vagal nerve pain and incredible changes to voice with cough when it turns on.    Seizures: Wednesday night-- had a bad seizure with right of mouth tongue bit, left wrist, left knee bruising.  Had wet the bed.   found her with wet blankets off the bed, wet gown in the wash, asleep on a towel on the bed.    Seizures are starting between the hours of 12a-2am-- magnet is swiped, not kept awake.  Then will continue with seizures in a cluster, almost always done before the sun rises.      Evaluated per PCP and told that her heart rate is not fluctuating.    She reports many seizures:    March 1st March 26 April 3-- big, 4-5 in a row-- son's birthday  ? Seizure causing neck pain between 21-27th  May 19th daughter's graduation  June 1st X 3 cluster  June 13th-- last week.    11/14/18 VNS implant per Dr Ratliff.  [employer], 868954     Vagus Nerve Stimulator interrogated:  Output Current (milliamps): 1.50  Signal Frequency (Hertz): 20  Pulse Width (Microseconds): 250  Signal On Time (seconds): 30  Signal Off Time (minutes): 5.0  Magnet Current (milliamps): 1.75  Magnet On Time (seconds): 500  Magnet Pulse Width (microseconds): 60     Autostim: 1.625  Pulse Width: 500  Signal On Time: 30    Tachy Det: ON--OFF  Heartbeat: 4  Threshold: 60%     Imp Value:3337 ohms  IFI NO     Stim per day:  Autostim: 131.8  Normal stim: 196.7  Magnet: 0.1    Current Outpatient Prescriptions   Medication Sig Dispense Refill   • traZODone (DESYREL) 100 MG Tab Take 0.5-1 Tabs by mouth every bedtime. 30 Tab 3   • LORazepam (ATIVAN) 1 MG Tab Take 1/2-1 tablet PO PRN breakthrough seizures.  Do not exceed more than  2 tablets in 24 hours unless directed otherwise by physician. 30 Tab 0   • LORazepam (ATIVAN) 1 MG Tab Ativan 1 mg tablet   Take 1 tablet 3 times a day by oral route.     • ipratropium-albuterol (COMBIVENT RESPIMAT)  MCG/ACT Aero Soln      • ACETAMINOPHEN-CODEINE #2 PO acetaminophen 300 mg-codeine 30 mg tablet     • albuterol 108 (90 Base) MCG/ACT Aero Soln inhalation aerosol 2 Puffs by Nebulization route.     • LamoTRIgine (LAMICTAL XR) 300 MG TABLET SR 24 HR TAKE 1 TABLET BY MOUTH TWICE DAILY     • ondansetron (ZOFRAN ODT) 4 MG TABLET DISPERSIBLE Take 1 Tab by mouth every 6 hours as needed. 10 Tab 1   • omeprazole (PRILOSEC) 40 MG delayed-release capsule 1 po QD     • LamoTRIgine (LAMICTAL XR) 300 MG TABLET SR 24 HR Take 1 mg by mouth 2 Times a Day. (Patient taking differently: Take 300 mg by mouth 2 Times a Day.) 180 Tab 3   • nicotine (NICODERM) 21 MG/24HR PATCH 24 HR Apply 1 Patch to skin as directed.     • Acetaminophen (TYLENOL CHILDRENS PO) Take  by mouth.       No current facility-administered medications for this visit.          Review of Systems   Constitutional: Positive for malaise/fatigue.   HENT: Negative for hearing loss, nosebleeds and sore throat.         No recent head injury.   Eyes: Negative for double vision.        No new loss of vision.   Respiratory: Negative for cough.         No recent lung infections.   Cardiovascular: Negative for chest pain.   Gastrointestinal: Negative for abdominal pain, diarrhea, nausea and vomiting.   Genitourinary: Negative.    Musculoskeletal: Positive for myalgias.   Skin: Negative.    Neurological: Positive for speech change (with VNS treatment) and seizures. Negative for headaches.   Endo/Heme/Allergies:        No history of endocrine dysfunction.  No new problems.   Psychiatric/Behavioral: Positive for memory loss. Negative for depression. The patient is nervous/anxious and has insomnia.         No recent mood changes.          Objective:     /68  "(BP Location: Left arm, Patient Position: Sitting, BP Cuff Size: Adult)   Pulse 82   Resp 16   Ht 1.753 m (5' 9.02\")   Wt 75.3 kg (166 lb)   LMP 11/09/2008 (Approximate)   SpO2 98%   BMI 24.50 kg/m²      Physical Exam   Constitutional: She is oriented to person, place, and time. She appears well-developed and well-nourished.   HENT:   Head: Normocephalic and atraumatic.   Eyes: Pupils are equal, round, and reactive to light.   Neck: Normal range of motion.   Cardiovascular: Normal rate and regular rhythm.    Pulmonary/Chest: Effort normal.   Neurological: She is alert and oriented to person, place, and time. She exhibits normal muscle tone. Gait normal.   No observable changes in neurologic status.  See initial new patient examination for details.    Skin: Skin is warm.   Psychiatric: Her mood appears anxious. She is agitated. She exhibits abnormal recent memory.             Assessment/Plan:     Seizure disorder:  History of events suggestive of complex partial seizures with secondary generalization since 2004.  Only AED prescribed is lamotrigine and Lamictal.     INTERPRETATION:  This is an abnormal video EEG recording in the awake, and drowsy state(s). Frequent left anterior temporal sharps and intermittent left anterior temporal slowing. The findings increase risk for seizures and suggest underlying area of cortical irritability and structural abnormality. Clinical and radiological correlation is recommended.     1) Will continue LTG ER 300mg BID.  2) Consider melatonin supplement at night.     VNS settings reviewed at length.  Sarita is very sensitive to the VNS turning on and also notices that it is cycling rapidly which is the case even though the current AutoStim mode is 60%.    We chose at this time to turn off the AutoStim mode and even during the rest of our time spent together, she seemed more at ease.      Discussed that the lamotrigine may be making her feel more excitable.  We will have the " goal to transition onto a different AED.     New Rx for trazodone provided.  Discussed her lack of sleep at length.  I'd like to adjust the LTG dosing to be greater with the morning dose which we can do in a few weeks if indicated.    New Rx for ativan 1mg tablets provided.    To stay in close telephone contact.    Return for follow-up in 3 months.      I spent 35+ minutes with this patient, over fifty percent was spent counseling patient on their condition, best management practices, reviewing test results and risks and benefits of treatment.          EDUCATION AND COUNSELING:  -Education was provided to the patient and/or family regarding diagnosis and prognosis. The chronic and unpredictable nature of the condition was discussed. There is increased risk for additional events, which may carry potential for significant injuries and death.    -We reviewed the current antiepileptic regimen. Potential side effects of antiepileptics were discussed at length, including but no limited to: hypersensitivity reactions (rash and others, some of which can be fatal), visual field changes (some of which may be irreversible), glaucoma, diplopia, kidney stones, osteopenia/osteoporosis/bone fractures, hyperthermia/anhydrosis, tremors, ataxia, dizziness, fatigue, increased risk for falls, cardiac arrhythmias/syncope, gastrointestinal (hepatitis, pancreatitis, gastritis, ulcers), gingival hypertrophy, drowsiness, sedation, anxiety/nervousness, increased risk for suicide, increased risk for depression, and psychosis. We reviewed drug-drug interactions and their potential effect on seizure control and medication side effects.    -Patient/family educated on SUDEP (Sudden Death in Epilepsy). Counseling was provided on the importance of strict medication and follow up compliance. The patient understands the risks associated with non-adherence with the medical plan as outlined, including but not limited to an increased risk for  breakthrough seizures, which may contribute to injuries, disability, status epilepticus, and even death.    -Counseling was also provided on potential effects of alcohol and other drugs, which may lower seizure threshold and/or affect the metabolism of antiepileptic drugs. I recommend avoidance of alcohol and illegal drugs.  -Recommend proper hydration, regular exercise, proper sleep hygiene (7-8 hrs of overnight sleep, avoid sleep deprivation).    -I have made the patient aware of mandatory reporting required by the law in the State of [place] regarding episodes of seizures, loss of consciousness, and/or alteration of awareness. The patient and family are responsible for reporting events to the DMV, instructions were provided. The patient verbalized understanding and states she has not been driving. Other seizure precautions were discussed at length, including no diving, no skydiving, no unsupervised swimming, no Jacuzzi or bathing in bathtubs.          Pt agrees with plan.

## 2019-06-19 ASSESSMENT — ENCOUNTER SYMPTOMS
COUGH: 0
NAUSEA: 0
DIARRHEA: 0
SEIZURES: 1
VOMITING: 0
DEPRESSION: 0
HEADACHES: 0
DOUBLE VISION: 0
MYALGIAS: 1
SPEECH CHANGE: 1
SORE THROAT: 0
MEMORY LOSS: 1
NERVOUS/ANXIOUS: 1
ABDOMINAL PAIN: 0
INSOMNIA: 1

## 2019-10-28 ENCOUNTER — OFFICE VISIT (OUTPATIENT)
Dept: NEUROLOGY | Facility: MEDICAL CENTER | Age: 54
End: 2019-10-28
Payer: COMMERCIAL

## 2019-10-28 VITALS
HEART RATE: 80 BPM | BODY MASS INDEX: 24.14 KG/M2 | SYSTOLIC BLOOD PRESSURE: 118 MMHG | HEIGHT: 69 IN | DIASTOLIC BLOOD PRESSURE: 72 MMHG | WEIGHT: 163 LBS

## 2019-10-28 DIAGNOSIS — F41.9 ANXIETY: ICD-10-CM

## 2019-10-28 DIAGNOSIS — Z96.89 STATUS POST PLACEMENT OF VNS (VAGUS NERVE STIMULATION) DEVICE: ICD-10-CM

## 2019-10-28 DIAGNOSIS — G47.9 SLEEP DISTURBANCE: ICD-10-CM

## 2019-10-28 DIAGNOSIS — G40.919 INTRACTABLE SEIZURE DISORDER (HCC): ICD-10-CM

## 2019-10-28 PROCEDURE — 99214 OFFICE O/P EST MOD 30 MIN: CPT | Performed by: NURSE PRACTITIONER

## 2019-10-28 RX ORDER — LORAZEPAM 1 MG/1
TABLET ORAL
COMMUNITY
Start: 2018-04-06 | End: 2019-12-10 | Stop reason: SDUPTHER

## 2019-10-28 RX ORDER — ELETRIPTAN HYDROBROMIDE 40 MG/1
TABLET, FILM COATED ORAL
Qty: 9 TAB | Refills: 5 | Status: SHIPPED | OUTPATIENT
Start: 2019-10-28 | End: 2020-10-12

## 2019-10-28 ASSESSMENT — ENCOUNTER SYMPTOMS
DIARRHEA: 0
COUGH: 1
HEADACHES: 0
VOMITING: 0
SORE THROAT: 0
MUSCULOSKELETAL NEGATIVE: 1
ABDOMINAL PAIN: 0
DOUBLE VISION: 0
NAUSEA: 0
SEIZURES: 1

## 2019-10-28 NOTE — PROGRESS NOTES
Subjective:      Sarita Loera is a 53 y.o. female who presents with Follow-Up (Intractable Seizure disorder)          HPI Here with  today.  She is very anxious and irritable at this time.    Seizures:  1am then 3am with seizures-- approximately within the past few months.    Migraines: prone to them in the past 5 years.  The pain is in the top of her head.  She will apply some pressure to help.  Pain onset is random throughout the day.  Does not awaken with the headache.  Takes tylenol as needed.    Sleep: tales trazodone nightly, 100mg qhs.  Has a hard time sleeping through the night.  Feels light-headed in the morning.  Will take it easy with position changes.     11/14/18 VNS implant per Dr Ratliff.  [employer], 301112     Vagus Nerve Stimulator interrogated:  Output Current (milliamps): 1.50  Signal Frequency (Hertz): 20  Pulse Width (Microseconds): 250  Signal On Time (seconds): 30  Signal Off Time (minutes): 5.0  Magnet Current (milliamps): 1.75  Magnet On Time (seconds): 500  Magnet Pulse Width (microseconds): 60     Autostim: 1.625  Pulse Width: 500  Signal On Time: 30    Tachy Det: OFF  Heartbeat: 4  Threshold: 60%     Imp Value: 3229 ohms  IFI NO     Stim per day:  Autostim: 0  Normal stim: 261  Magnet: 0.02    Feels discomfort down her left arm from the VNS turning on.  Multiple physical complaints throughout the appointment.    Current Outpatient Medications   Medication Sig Dispense Refill   • LORazepam (ATIVAN) 1 MG Tab Take 1/2-1 tablet PO PRN breakthrough seizures.  Do not exceed more than 2 tablets in 24 hours unless directed otherwise by physician.     • traZODone (DESYREL) 100 MG Tab TAKE 1/2 TO 1 TABLET BY MOUTH EVERY NIGHT AT BEDTIME 30 Tab 1   • LamoTRIgine 300 MG TABLET SR 24 HR TAKE 1 TABLET BY MOUTH TWICE DAILY 180 Tab 1   • omeprazole (PRILOSEC) 40 MG delayed-release capsule 1 po QD       No current facility-administered medications for this visit.        Review of Systems  "  Constitutional: Positive for malaise/fatigue.   HENT: Positive for congestion. Negative for hearing loss, nosebleeds and sore throat.         No recent head injury.   Eyes: Negative for double vision.        No new loss of vision.   Respiratory: Positive for cough.         No recent lung infections.   Cardiovascular: Negative for chest pain.   Gastrointestinal: Negative for abdominal pain, diarrhea, nausea and vomiting.   Genitourinary: Negative.    Musculoskeletal: Negative.    Skin: Negative.    Neurological: Positive for speech change (with VNS therapy) and seizures. Negative for headaches.   Endo/Heme/Allergies:        No history of endocrine dysfunction.  No new problems.   Psychiatric/Behavioral: Positive for depression and memory loss. The patient is nervous/anxious.         No recent mood changes.          Objective:     /72 (BP Location: Left arm, Patient Position: Sitting, BP Cuff Size: Adult)   Pulse 80   Ht 1.753 m (5' 9\")   Wt 73.9 kg (163 lb)   LMP 11/09/2008 (Approximate)   BMI 24.07 kg/m²      Physical Exam  Constitutional:       Appearance: Normal appearance. She is well-developed.   HENT:      Head: Normocephalic and atraumatic.      Nose: Nose normal.   Eyes:      Pupils: Pupils are equal, round, and reactive to light.   Neck:      Musculoskeletal: Normal range of motion.   Cardiovascular:      Rate and Rhythm: Normal rate and regular rhythm.   Pulmonary:      Effort: Pulmonary effort is normal.   Musculoskeletal: Normal range of motion.   Skin:     General: Skin is warm.   Neurological:      Mental Status: She is alert and oriented to person, place, and time.      Motor: No abnormal muscle tone.      Gait: Gait normal.      Comments: No observable changes in neurologic status.  See initial new patient examination for details.    Psychiatric:         Mood and Affect: Mood is anxious.         Behavior: Behavior is agitated.         Cognition and Memory: She exhibits impaired recent " memory.               Assessment/Plan:     Seizure disorder:  History of events suggestive of complex partial seizures with secondary generalization since 2004.  Only AED prescribed is lamotrigine and Lamictal.     INTERPRETATION:  This is an abnormal video EEG recording in the awake, and drowsy state(s). Frequent left anterior temporal sharps and intermittent left anterior temporal slowing. The findings increase risk for seizures and suggest underlying area of cortical irritability and structural abnormality. Clinical and radiological correlation is recommended.     1) Will continue LTG ER 300mg BID.  2) Consider melatonin supplement at night.     VNS settings reviewed at length.  She is well-educated regarding the intention of usage for auto-stim mode.  She wishes to leave Auto-stim off at this time.     Discussed that the lamotrigine may be making her feel more excitable.  We will have the goal to transition onto a different AED.     Headaches:  New Rx for Relpax provided.    Sleep disturbance:  Continue trazodone as prescribed. Discussed her lack of sleep at length.  I'd like to adjust the LTG dosing to be greater with the morning dose which we can do in a few weeks if indicated.     New Rx for ativan 1mg tablets provided.    Obtain 24-hour AEEG to evaluate for subclinical seizures and to evaluate sleep consistency.    Return for follow-up in 3 months.      I spent 35+ minutes with this patient, over fifty percent was spent counseling patient on their condition, best management practices, reviewing test results and risks and benefits of treatment.

## 2019-11-04 PROBLEM — G47.9 SLEEP DISTURBANCE: Status: ACTIVE | Noted: 2019-11-04

## 2019-11-04 PROBLEM — F41.9 ANXIETY: Status: ACTIVE | Noted: 2019-11-04

## 2019-11-04 ASSESSMENT — ENCOUNTER SYMPTOMS
SPEECH CHANGE: 1
MEMORY LOSS: 1
NERVOUS/ANXIOUS: 1
DEPRESSION: 1

## 2020-02-03 ENCOUNTER — OFFICE VISIT (OUTPATIENT)
Dept: NEUROLOGY | Facility: MEDICAL CENTER | Age: 55
End: 2020-02-03
Payer: COMMERCIAL

## 2020-02-03 ENCOUNTER — HOSPITAL ENCOUNTER (OUTPATIENT)
Dept: LAB | Facility: MEDICAL CENTER | Age: 55
End: 2020-02-03
Attending: NURSE PRACTITIONER
Payer: COMMERCIAL

## 2020-02-03 VITALS
DIASTOLIC BLOOD PRESSURE: 68 MMHG | OXYGEN SATURATION: 98 % | HEIGHT: 69 IN | SYSTOLIC BLOOD PRESSURE: 116 MMHG | HEART RATE: 67 BPM | BODY MASS INDEX: 23.7 KG/M2 | TEMPERATURE: 98.4 F | RESPIRATION RATE: 16 BRPM | WEIGHT: 160 LBS

## 2020-02-03 DIAGNOSIS — F41.9 ANXIETY: ICD-10-CM

## 2020-02-03 DIAGNOSIS — Z91.89 AT RISK FOR OSTEOPENIA: ICD-10-CM

## 2020-02-03 DIAGNOSIS — G40.919 INTRACTABLE SEIZURE DISORDER (HCC): Primary | ICD-10-CM

## 2020-02-03 DIAGNOSIS — Z96.89 STATUS POST PLACEMENT OF VNS (VAGUS NERVE STIMULATION) DEVICE: ICD-10-CM

## 2020-02-03 DIAGNOSIS — G40.919 INTRACTABLE SEIZURE DISORDER (HCC): ICD-10-CM

## 2020-02-03 DIAGNOSIS — G47.9 SLEEP DISTURBANCE: ICD-10-CM

## 2020-02-03 LAB
ALBUMIN SERPL BCP-MCNC: 4.9 G/DL (ref 3.2–4.9)
ALBUMIN/GLOB SERPL: 1.8 G/DL
ALP SERPL-CCNC: 81 U/L (ref 30–99)
ALT SERPL-CCNC: 14 U/L (ref 2–50)
ANION GAP SERPL CALC-SCNC: 7 MMOL/L (ref 0–11.9)
AST SERPL-CCNC: 16 U/L (ref 12–45)
BASOPHILS # BLD AUTO: 0.5 % (ref 0–1.8)
BASOPHILS # BLD: 0.05 K/UL (ref 0–0.12)
BILIRUB SERPL-MCNC: 0.4 MG/DL (ref 0.1–1.5)
BUN SERPL-MCNC: 12 MG/DL (ref 8–22)
CALCIUM SERPL-MCNC: 10.3 MG/DL (ref 8.5–10.5)
CHLORIDE SERPL-SCNC: 104 MMOL/L (ref 96–112)
CO2 SERPL-SCNC: 30 MMOL/L (ref 20–33)
CREAT SERPL-MCNC: 0.77 MG/DL (ref 0.5–1.4)
EOSINOPHIL # BLD AUTO: 0.16 K/UL (ref 0–0.51)
EOSINOPHIL NFR BLD: 1.7 % (ref 0–6.9)
ERYTHROCYTE [DISTWIDTH] IN BLOOD BY AUTOMATED COUNT: 45.9 FL (ref 35.9–50)
GLOBULIN SER CALC-MCNC: 2.7 G/DL (ref 1.9–3.5)
GLUCOSE SERPL-MCNC: 86 MG/DL (ref 65–99)
HCT VFR BLD AUTO: 48.1 % (ref 37–47)
HGB BLD-MCNC: 15.4 G/DL (ref 12–16)
IMM GRANULOCYTES # BLD AUTO: 0.04 K/UL (ref 0–0.11)
IMM GRANULOCYTES NFR BLD AUTO: 0.4 % (ref 0–0.9)
LYMPHOCYTES # BLD AUTO: 2.97 K/UL (ref 1–4.8)
LYMPHOCYTES NFR BLD: 31.9 % (ref 22–41)
MCH RBC QN AUTO: 29.4 PG (ref 27–33)
MCHC RBC AUTO-ENTMCNC: 32 G/DL (ref 33.6–35)
MCV RBC AUTO: 92 FL (ref 81.4–97.8)
MONOCYTES # BLD AUTO: 0.51 K/UL (ref 0–0.85)
MONOCYTES NFR BLD AUTO: 5.5 % (ref 0–13.4)
NEUTROPHILS # BLD AUTO: 5.59 K/UL (ref 2–7.15)
NEUTROPHILS NFR BLD: 60 % (ref 44–72)
NRBC # BLD AUTO: 0 K/UL
NRBC BLD-RTO: 0 /100 WBC
PLATELET # BLD AUTO: 287 K/UL (ref 164–446)
PMV BLD AUTO: 8.7 FL (ref 9–12.9)
POTASSIUM SERPL-SCNC: 4.2 MMOL/L (ref 3.6–5.5)
PROT SERPL-MCNC: 7.6 G/DL (ref 6–8.2)
RBC # BLD AUTO: 5.23 M/UL (ref 4.2–5.4)
SODIUM SERPL-SCNC: 141 MMOL/L (ref 135–145)
WBC # BLD AUTO: 9.3 K/UL (ref 4.8–10.8)

## 2020-02-03 PROCEDURE — 82306 VITAMIN D 25 HYDROXY: CPT

## 2020-02-03 PROCEDURE — 80175 DRUG SCREEN QUAN LAMOTRIGINE: CPT

## 2020-02-03 PROCEDURE — 95970 ALYS NPGT W/O PRGRMG: CPT | Performed by: NURSE PRACTITIONER

## 2020-02-03 PROCEDURE — 99214 OFFICE O/P EST MOD 30 MIN: CPT | Mod: 25 | Performed by: NURSE PRACTITIONER

## 2020-02-03 PROCEDURE — 80053 COMPREHEN METABOLIC PANEL: CPT

## 2020-02-03 PROCEDURE — 85025 COMPLETE CBC W/AUTO DIFF WBC: CPT

## 2020-02-03 PROCEDURE — 36415 COLL VENOUS BLD VENIPUNCTURE: CPT

## 2020-02-03 ASSESSMENT — ENCOUNTER SYMPTOMS
NAUSEA: 0
SEIZURES: 1
SORE THROAT: 0
CONSTITUTIONAL NEGATIVE: 1
HEADACHES: 0
DOUBLE VISION: 0
DIARRHEA: 0
MUSCULOSKELETAL NEGATIVE: 1
ABDOMINAL PAIN: 0
VOMITING: 0
COUGH: 0

## 2020-02-03 NOTE — PROGRESS NOTES
"Subjective:      Sarita Loera is a 54 y.o. female who presents with Follow-Up (Status post placement of VNS (vagus nerve stimulation) device)          Here with  today.    HPI    November 2019: About 3 months ago, she went to the bathroom between 1-6am --  Had used the toilet and her  found her having a seizure leaning against the wall.    Most common, lip smacking, right hand circling motion-- lasting up to 60 seconds.  Sometimes she will yell out \"Oh my god\" or \"Oh no\" which awakens her .    Friday was the last known seizure.   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  Awoke about 0930.  No known triggers.    Many magnet attempts were on December 26, 2019.  Does seem like the magnet usage helps with her seizure recovery.    Seems like she has had more seizures over the past year.  Occurring about one time per week.  Tends to have more seizures at the beginning of the month.  There may be correlation to being transitioned off of name brand Lamictal and onto Lamotrigine.      Still reports discomfort in the neck region.  She prefers to sleep on her left side and awakens feeling uncomfortable from the VNS at times.    Vagus Nerve Stimulator interrogated:  Output Current (milliamps): 1.50  Signal Frequency (Hertz): 20  Pulse Width (Microseconds): 250  Signal On Time (seconds): 30  Signal Off Time (minutes): 5.0  Magnet Current (milliamps): 1.75  Magnet On Time (seconds): 500  Magnet Pulse Width (microseconds): 60     Autostim: 1.625  Pulse Width: 500  Signal On Time: 30    Tachy Det: OFF  Heartbeat: 4  Threshold: 60%     Imp Value: 3310 ohms  IFI NO     Stim per day:  Autostim: 0  Normal stim: 261  Magnet: 0.02     Trazodone 1/2 prn nightly    Current Outpatient Medications   Medication Sig Dispense Refill   • LamoTRIgine 300 MG TABLET SR 24 HR TAKE 1 TABLET BY MOUTH TWICE DAILY 180 Tab 0   • " "LORazepam (ATIVAN) 1 MG Tab TAKE 1/2(HALF) TO 1 TABLET BY MOUTH DAILY AS NEEDED FOR BREAKTHROUGH SEIZURES. MAX 2 TABLETS IN A DAY UNLESS OTHERWISE DIRECTED FOR 30 DAYS 30 Tab 0   • traZODone (DESYREL) 100 MG Tab TAKE 1/2 TO 1 TABLET BY MOUTH EVERY NIGHT AT BEDTIME 30 Tab 1   • eletriptan (RELPAX) 40 MG tablet Take 1/2-1 tablet po at onset of headache, may repeat dose in 2 hours if unrelieved.  Do not exceed more than 2 tablets in 24 hours. (Patient not taking: Reported on 2/3/2020) 9 Tab 5   • omeprazole (PRILOSEC) 40 MG delayed-release capsule 1 po QD       No current facility-administered medications for this visit.        Review of Systems   Constitutional: Negative.    HENT: Negative for hearing loss, nosebleeds and sore throat.         No recent head injury.   Eyes: Negative for double vision.        No new loss of vision.   Respiratory: Negative for cough.         No recent lung infections.   Cardiovascular: Negative for chest pain.   Gastrointestinal: Negative for abdominal pain, diarrhea, nausea and vomiting.   Genitourinary: Negative.    Musculoskeletal: Negative.    Skin: Negative.    Neurological: Positive for seizures. Negative for headaches.   Endo/Heme/Allergies:        No history of endocrine dysfunction.  No new problems.   Psychiatric/Behavioral: Positive for depression and memory loss. The patient is nervous/anxious.         No recent mood changes.          Objective:     /68 (BP Location: Right arm, Patient Position: Sitting, BP Cuff Size: Adult)   Pulse 67   Temp 36.9 °C (98.4 °F) (Temporal)   Resp 16   Ht 1.753 m (5' 9.02\")   Wt 72.6 kg (160 lb)   LMP 11/09/2008 (Approximate)   SpO2 98%   BMI 23.62 kg/m²      Physical Exam  Constitutional:       Appearance: She is well-developed.   HENT:      Head: Normocephalic and atraumatic.   Eyes:      Pupils: Pupils are equal, round, and reactive to light.   Neck:      Musculoskeletal: Normal range of motion.   Cardiovascular:      Rate and " Rhythm: Normal rate and regular rhythm.   Pulmonary:      Effort: Pulmonary effort is normal.   Musculoskeletal: Normal range of motion.   Skin:     General: Skin is warm.   Neurological:      Mental Status: She is alert and oriented to person, place, and time.      Motor: No abnormal muscle tone.      Gait: Gait normal.      Comments: No observable changes in neurologic status.  See initial new patient examination for details.    Psychiatric:         Mood and Affect: Mood is anxious.         Behavior: Behavior is slowed.         Cognition and Memory: She exhibits impaired recent memory.                 Assessment/Plan:     Seizure disorder:  History of events suggestive of complex partial seizures with secondary generalization since 2004.  Only AED prescribed is lamotrigine and Lamictal.    Increase in seizures in the past year partially attributed to change from Lamictal to generic lamotrigine due to insurance.    INTERPRETATION:  This is an abnormal video EEG recording in the awake, and drowsy state(s). Frequent left anterior temporal sharps and intermittent left anterior temporal slowing. The findings increase risk for seizures and suggest underlying area of cortical irritability and structural abnormality. Clinical and radiological correlation is recommended.     Lengthy conversation regarding uptake in seizures.  Consider Depakote? Onfi? Vimpat?     Failed Fycompa  Failed gabapentin    Continue LTG ER 300mg BID.    Continue VNS with no dosing changes today.  Educated regarding the value of turning the auto-stimulation mode back on at a high percentage.  They will consider at the next appointment.  Asked to utilize the magnet much more.    Sleep disturbance:  Continue trazodone as prescribed. Discussed her lack of sleep at length.  I'd like to adjust the LTG dosing to be greater with the morning dose which we can do in a few weeks if indicated.     Obtain labs as ordered.  We plan to optimize dosing of LTG with  this next set of labs.    Consider repeat lamotrigine level as a comparison in 3-4 months from now.    Recently provided ativan 1mg tablets.  Counseled regarding appropriate usage.    Return for follow-up in 4 months.     I spent 35+ minutes with this patient, over fifty percent was spent counseling patient on their condition, best management practices, reviewing test results and risks and benefits of treatment.      EDUCATION AND COUNSELING:  -Education was provided to the patient and/or family regarding diagnosis and prognosis. The chronic and unpredictable nature of the condition was discussed. There is increased risk for additional events, which may carry potential for significant injuries and death.    -We reviewed the current antiepileptic regimen. Potential side effects of antiepileptics were discussed at length, including but no limited to: hypersensitivity reactions (rash and others, some of which can be fatal), visual field changes (some of which may be irreversible), glaucoma, diplopia, kidney stones, osteopenia/osteoporosis/bone fractures, hyperthermia/anhydrosis, tremors, ataxia, dizziness, fatigue, increased risk for falls, cardiac arrhythmias/syncope, gastrointestinal (hepatitis, pancreatitis, gastritis, ulcers), gingival hypertrophy, drowsiness, sedation, anxiety/nervousness, increased risk for suicide, increased risk for depression, and psychosis. We reviewed drug-drug interactions and their potential effect on seizure control and medication side effects.    -Patient/family educated on SUDEP (Sudden Death in Epilepsy). Counseling was provided on the importance of strict medication and follow up compliance. The patient understands the risks associated with non-adherence with the medical plan as outlined, including but not limited to an increased risk for breakthrough seizures, which may contribute to injuries, disability, status epilepticus, and even death.    -Counseling was also provided on  potential effects of alcohol and other drugs, which may lower seizure threshold and/or affect the metabolism of antiepileptic drugs. I recommend avoidance of alcohol and illegal drugs.  -Recommend proper hydration, regular exercise, proper sleep hygiene (7-8 hrs of overnight sleep, avoid sleep deprivation).    -I have made the patient aware of mandatory reporting required by the law in the [place] of [place] regarding episodes of seizures, loss of consciousness, and/or alteration of awareness. The patient and family are responsible for reporting events to the DMV, instructions were provided. The patient verbalized understanding and states she has not been driving. Other seizure precautions were discussed at length, including no diving, no skydiving, no unsupervised swimming, no Jacuzzi or bathing in bathtubs.    Pt and  agrees with plan.

## 2020-02-04 LAB — 25(OH)D3 SERPL-MCNC: 39 NG/ML (ref 30–100)

## 2020-02-04 ASSESSMENT — ENCOUNTER SYMPTOMS
DEPRESSION: 1
MEMORY LOSS: 1
NERVOUS/ANXIOUS: 1

## 2020-02-05 ENCOUNTER — TELEPHONE (OUTPATIENT)
Dept: NEUROLOGY | Facility: MEDICAL CENTER | Age: 55
End: 2020-02-05

## 2020-02-05 LAB — LAMOTRIGINE SERPL-MCNC: 9.4 UG/ML (ref 2.5–15)

## 2020-02-05 RX ORDER — LAMOTRIGINE 25 MG/1
TABLET ORAL
Qty: 90 TAB | Refills: 1 | Status: SHIPPED | OUTPATIENT
Start: 2020-02-05 | End: 2020-05-27 | Stop reason: SDUPTHER

## 2020-02-05 NOTE — TELEPHONE ENCOUNTER
I have reviewed Sarita's labs.      1) Her lamotrigine blood level is nearly exact to the test 2 years ago.  2) Let's further increase her dosing from LTG ER 300mg BID by adding 25mg weekly to the morning dose only.      Lamotrigine ER 300mg BID + 25mg qam X 2 weeks then 50mg qam X 2 weeks then 75mg qam X 2 weeks then 100mg qam thereafter.      We can than change the small 25mg tablets to a more concentrated 100mg tablet in the future.         Ref. Range 8/16/2017 10:00 11/17/2017 12:12 11/20/2017 16:49 2/3/2020 15:12   Lamotrigine Latest Ref Range: 2.5 - 15.0 ug/mL 9.5   9.4   25-Hydroxy   Vitamin D 25 Latest Ref Range: 30 - 100 ng/mL 36.7   39

## 2020-05-18 DIAGNOSIS — G40.919 INTRACTABLE SEIZURE DISORDER (HCC): ICD-10-CM

## 2020-05-18 NOTE — TELEPHONE ENCOUNTER
Received request via: Patient    Was the patient seen in the last year in this department? Yes    Does the patient have an active prescription (recently filled or refills available) for medication(s) requested? No      called and stated that she had a cluster of 5 seizures during the weekend. Starting Sunday at midnight until noon. This is the 3rd or 4th time it has happened.     Another provider gave her steroids (methylprednisone 4mg dose pack for 7 days) for her on going GI issue. Is that something that will induce her seizures.

## 2020-05-19 ENCOUNTER — TELEPHONE (OUTPATIENT)
Dept: NEUROLOGY | Facility: MEDICAL CENTER | Age: 55
End: 2020-05-19

## 2020-05-19 RX ORDER — LORAZEPAM 1 MG/1
TABLET ORAL
Qty: 30 TAB | Refills: 0 | Status: SHIPPED
Start: 2020-05-19 | End: 2020-06-18

## 2020-05-19 NOTE — TELEPHONE ENCOUNTER
Called  and he requested for something sooner than 4 weeks as patient had 2 more seizures this morning sitting in her chair. She is scheduled for 05/27 for a virtual appt.     Rx called into pharmacy.     LORazepam (ATIVAN) 1 MG Tab   #30  0 additional refills   Sig: TAKE 1/2(HALF) TO 1 TABLET BY MOUTH DAILY AS NEEDED FOR BREAKTHROUGH SEIZURES. MAX 2 TABLETS IN A DAY UNLESS OTHERWISE DIRECTED FOR 30 DAYS.      Preferred Pharmacies        [facility] - [place], [place] - [address] [facility] AT [place] [address] [phone] (Phone)  [phone] (Fax)

## 2020-05-19 NOTE — TELEPHONE ENCOUNTER
called and stated that she had a cluster of 5 seizures during the weekend. Starting Sunday at midnight until noon. This is the 3rd or 4th time it has happened.      Another provider gave her steroids (methylprednisone 4mg dose pack for 7 days) for her on going GI issue. Is that something that will induce her seizures.        --Let's schedule an appointment with her within 4 weeks from now, virtual okay.    --I am concerned about her clustering of seizures.  The steroids or lack of absorbing medications can lower her seizure threshold.    --New ativan Rx printed.

## 2020-05-19 NOTE — TELEPHONE ENCOUNTER
Let's have her increase the Lamotrigine dosing from 300mg BID with using the 25mg tablets.      Lamotrigine titration:    AM             PM  325mg -- 300mg X 1 week  350mg -- 300mg thereafter      Can we confirm she hasn't had a generic company change with the last refill of Lamotrigine and that her dosing is unchanged?  It's odd that she is having so many seizures with them being rather infrequent until now.

## 2020-05-27 ENCOUNTER — TELEMEDICINE (OUTPATIENT)
Dept: NEUROLOGY | Facility: MEDICAL CENTER | Age: 55
End: 2020-05-27
Payer: COMMERCIAL

## 2020-05-27 VITALS — BODY MASS INDEX: 23.7 KG/M2 | HEIGHT: 69 IN | WEIGHT: 160 LBS

## 2020-05-27 DIAGNOSIS — Z96.89 STATUS POST PLACEMENT OF VNS (VAGUS NERVE STIMULATION) DEVICE: ICD-10-CM

## 2020-05-27 DIAGNOSIS — G40.919 INTRACTABLE SEIZURE DISORDER (HCC): ICD-10-CM

## 2020-05-27 DIAGNOSIS — F41.9 ANXIETY: ICD-10-CM

## 2020-05-27 PROCEDURE — 99214 OFFICE O/P EST MOD 30 MIN: CPT | Mod: 95,CR | Performed by: NURSE PRACTITIONER

## 2020-05-27 RX ORDER — LAMOTRIGINE 25 MG/1
TABLET ORAL
Qty: 90 TAB | Refills: 1 | Status: SHIPPED | OUTPATIENT
Start: 2020-05-27 | End: 2020-11-11 | Stop reason: SDUPTHER

## 2020-05-27 ASSESSMENT — FIBROSIS 4 INDEX: FIB4 SCORE: 0.82

## 2020-05-27 NOTE — PROGRESS NOTES
"Telemedicine Visit: Established Patient     This encounter was conducted via Zoom .   Verbal consent was obtained. Patient's identity was verified.    Subjective:   CC: Follow-up for Seizure Disorder, urgent    Sarita Loera is a 54 y.o. female presenting for evaluation and management of:    Seizure description:  Most common, lip smacking, right hand circling motion-- lasting up to 60 seconds.  Sometimes she will yell out \"Oh my god\" or \"Oh no\" which awakens her .    Seizures are occurring about one time every 2 weeks and they once were one time per week.  They are more severe/intense when occuring.  She has had 1-3 seizures on average and typically no further seizures after being given the lorazepam.      Of concern: The most recent was on Tuesday, 5/18-- starting at 12am, with 3 more by 4am and had a last one while sitting in living room. They are using an ativan when she is unable to sleep for a few days.   has been giving her a tablet of lorazepam after the 1st seizure-- ran out ativan with the cluster.  She had been up all night a few days prior and then in the ED for chronic constipation.     Has had a flare-up with her \"colon again\".  Started on a steroid pack but had seizure on the 2nd day of treatment.  Struggling with constipation.  On Wednesday night on the 13th for not being able to defecate.  Planning a colonoscopy tomorrow.      Tremor:  Most noteworthy when out of the home.  Tends to be aggravated with anxiety.  Head danitza is most apparent when she has the tremor.    Vagus Nerve Stimulator not interrogated: still notices \"a little pain in the neck\" and with a voice change.    Output Current (milliamps): 1.50  Signal Frequency (Hertz): 20  Pulse Width (Microseconds): 250  Signal On Time (seconds): 30  Signal Off Time (minutes): 5.0  Magnet Current (milliamps): 1.75  Magnet On Time (seconds): 500  Magnet Pulse Width (microseconds): 60     Autostim: 1.625  Pulse Width: 500  Signal On " Time: 30    Tachy Det: OFF  Heartbeat: 4  Threshold: 60%     Imp Value:   IFI      Stim per day:  Autostim: 0  Normal stim: 261  Magnet: 0.02     Trazodone 1/2 prn nightly  Told through the pharmacy that she could not get the lamotrigine 25mg tablets and thus has not further titrated.     Ref. Range 8/16/2017 10:00 11/17/2017 12:12 11/20/2017 16:49 2/3/2020 15:12   Lamotrigine Latest Ref Range: 2.5 - 15.0 ug/mL 9.5   9.4   25-Hydroxy   Vitamin D 25 Latest Ref Range: 30 - 100 ng/mL 36.7   39       ROS   Denies any recent fevers or chills. No nausea or vomiting. No chest pains or shortness of breath.     No Known Allergies    Current medicines (including changes today)  Current Outpatient Medications   Medication Sig Dispense Refill   • LORazepam (ATIVAN) 1 MG Tab TAKE 1/2(HALF) TO 1 TABLET BY MOUTH DAILY AS NEEDED FOR BREAKTHROUGH SEIZURES. MAX 2 TABLETS IN A DAY UNLESS OTHERWISE DIRECTED FOR 30 DAYS 30 Tab 0   • LamoTRIgine 300 MG TABLET SR 24 HR TAKE 1 TABLET BY MOUTH TWICE DAILY 180 Tab 0   • eletriptan (RELPAX) 40 MG tablet Take 1/2-1 tablet po at onset of headache, may repeat dose in 2 hours if unrelieved.  Do not exceed more than 2 tablets in 24 hours. 9 Tab 5   • traZODone (DESYREL) 100 MG Tab TAKE 1/2 TO 1 TABLET BY MOUTH EVERY NIGHT AT BEDTIME 30 Tab 1   • omeprazole (PRILOSEC) 40 MG delayed-release capsule 1 po QD     • lamoTRIgine (LAMICTAL) 25 MG Tab Take one tablet po qam X 2 weeks then two tablets po qam X 2 weeks then 3 tablets po qam X 2 weeks then 4 tablets po qam thereafter. (Patient not taking: Reported on 5/27/2020) 90 Tab 1     No current facility-administered medications for this visit.        Patient Active Problem List    Diagnosis Date Noted   • Sleep disturbance 11/04/2019   • Anxiety 11/04/2019   • Status post placement of VNS (vagus nerve stimulation) device 11/28/2018   • Intractable seizure disorder (HCC) 11/14/2018   • Post-op pain 11/14/2018   • Abdominal pain, unspecified site  "11/29/2016       No family history on file.    She  has a past medical history of Cosme's esophagus, Bowel habit changes, Pain, Pneumonia (08/2018), Psychiatric problem, Seizure (HCC), and Tuberculosis.  She  has a past surgical history that includes gyn surgery (1997); gastroscopy (11/29/2016); colonoscopy (11/29/2016); other (2014); other (Left, 2008); and nerve stimulator vagus (11/14/2018).       Objective:   Ht 1.753 m (5' 9.02\")   Wt 72.6 kg (160 lb)   LMP 11/09/2008 (Approximate)   BMI 23.62 kg/m²     Physical Exam:  Constitutional: Alert, no distress, well-groomed.  Skin: No rashes in visible areas.  Eye: Round. Conjunctiva clear, lids normal. No icterus.   ENMT: Lips pink without lesions, good dentition, moist mucous membranes. Phonation normal.  Neck: No masses, no thyromegaly. Moves freely without pain.  CV: Pulse as reported by patient  Respiratory: Unlabored respiratory effort, no cough or audible wheeze  Psych: Alert and oriented x3, slowed, anxious, impaired recent memory.       Assessment and Plan:   The following treatment plan was discussed:     Seizure disorder, left anterior temporal onset:  History of seizures suggestive of complex partial seizures with secondary generalization since 2004.  Only AED prescribed is lamotrigine and Lamictal.     Increase in seizures in the past year partially attributed to change from Lamictal to generic lamotrigine due to insurance.  Intercurrently, seizures have been less frequent, about one time every 2 weeks, but more intense and clustering if not given lorazepam.    Intercurrently was unable to increase the Lamotrigine as recommended.     11/2017 EEG INTERPRETATION:  This is an abnormal video EEG recording in the awake, and drowsy state(s). Frequent left anterior temporal sharps and intermittent left anterior temporal slowing. The findings increase risk for seizures and suggest underlying area of cortical irritability and structural abnormality. Clinical " and radiological correlation is recommended.     Lengthy conversation regarding uptake in seizures.  Consider Depakote? Onfi? Vimpat?   Failed Fycompa  Failed gabapentin     Continue LTG ER 300mg BID with further dose increase using a lamotrigine 25mg tablet prescription.  Will titrated further to goal of LTG 300mg BID + 100mg qam.     Continue VNS with no interrogation.     Sleep disturbance:  Continue trazodone as prescribed. Discussed her lack of sleep at length.  I'd like to adjust the LTG dosing to be greater with the morning dose which we can do in a few weeks if indicated.     Obtain labs as ordered.  We plan to optimize dosing of LTG with this next set of labs.     Consider repeat lamotrigine level as a comparison in 3-4 months from now.     Recently provided ativan 1mg tablets.  Counseled regarding appropriate usage, to take after the first seizure in the hopes of avoiding a cluster.     Plans to establish with Internal Medicine, Dr Morales.    Follow-up: Return in August 2020 for appointment and VNS check.    EDUCATION AND COUNSELING:  -Education was provided to the patient and/or family regarding diagnosis and prognosis. The chronic and unpredictable nature of the condition was discussed. There is increased risk for additional events, which may carry potential for significant injuries and death.    -We reviewed the current antiepileptic regimen. Potential side effects of antiepileptics were discussed at length, including but no limited to: hypersensitivity reactions (rash and others, some of which can be fatal), visual field changes (some of which may be irreversible), glaucoma, diplopia, kidney stones, osteopenia/osteoporosis/bone fractures, hyperthermia/anhydrosis, tremors, ataxia, dizziness, fatigue, increased risk for falls, cardiac arrhythmias/syncope, gastrointestinal (hepatitis, pancreatitis, gastritis, ulcers), gingival hypertrophy, drowsiness, sedation, anxiety/nervousness, increased risk for  suicide, increased risk for depression, and psychosis. We reviewed drug-drug interactions and their potential effect on seizure control and medication side effects.    -Patient/family educated on SUDEP (Sudden Death in Epilepsy). Counseling was provided on the importance of strict medication and follow up compliance. The patient understands the risks associated with non-adherence with the medical plan as outlined, including but not limited to an increased risk for breakthrough seizures, which may contribute to injuries, disability, status epilepticus, and even death.    -Counseling was also provided on potential effects of alcohol and other drugs, which may lower seizure threshold and/or affect the metabolism of antiepileptic drugs. I recommend avoidance of alcohol and illegal drugs.  -Recommend proper hydration, regular exercise, proper sleep hygiene (7-8 hrs of overnight sleep, avoid sleep deprivation).    -I have made the patient aware of mandatory reporting required by the law in the State of [place] regarding episodes of seizures, loss of consciousness, and/or alteration of awareness. The patient and family are responsible for reporting events to the DMV, instructions were provided. The patient verbalized understanding and states she has not been driving. Other seizure precautions were discussed at length, including no diving, no skydiving, no unsupervised swimming, no Jacuzzi or bathing in bathtubs.    Pt and  agrees with plan.

## 2020-09-23 ENCOUNTER — TELEPHONE (OUTPATIENT)
Dept: NEUROLOGY | Facility: MEDICAL CENTER | Age: 55
End: 2020-09-23

## 2020-11-11 ENCOUNTER — OFFICE VISIT (OUTPATIENT)
Dept: NEUROLOGY | Facility: MEDICAL CENTER | Age: 55
End: 2020-11-11
Payer: COMMERCIAL

## 2020-11-11 VITALS
SYSTOLIC BLOOD PRESSURE: 108 MMHG | TEMPERATURE: 98.2 F | RESPIRATION RATE: 15 BRPM | HEART RATE: 65 BPM | BODY MASS INDEX: 25.01 KG/M2 | DIASTOLIC BLOOD PRESSURE: 72 MMHG | OXYGEN SATURATION: 96 % | HEIGHT: 69 IN | WEIGHT: 168.87 LBS

## 2020-11-11 DIAGNOSIS — G47.9 SLEEP DISTURBANCE: ICD-10-CM

## 2020-11-11 DIAGNOSIS — G40.909 SEIZURE DISORDER (HCC): ICD-10-CM

## 2020-11-11 DIAGNOSIS — Z96.89 STATUS POST PLACEMENT OF VNS (VAGUS NERVE STIMULATION) DEVICE: ICD-10-CM

## 2020-11-11 DIAGNOSIS — F41.9 ANXIETY: ICD-10-CM

## 2020-11-11 PROCEDURE — 95971 ALYS SMPL SP/PN NPGT W/PRGRM: CPT | Performed by: NURSE PRACTITIONER

## 2020-11-11 PROCEDURE — 99214 OFFICE O/P EST MOD 30 MIN: CPT | Mod: 25 | Performed by: NURSE PRACTITIONER

## 2020-11-11 RX ORDER — LAMOTRIGINE 25 MG/1
TABLET ORAL
Qty: 90 TAB | Refills: 1 | Status: SHIPPED | OUTPATIENT
Start: 2020-11-11 | End: 2021-04-19

## 2020-11-11 RX ORDER — LAMOTRIGINE 300 MG/1
1 TABLET, EXTENDED RELEASE ORAL 2 TIMES DAILY
Qty: 180 TAB | Refills: 1 | Status: SHIPPED | OUTPATIENT
Start: 2020-11-11 | End: 2021-04-15

## 2020-11-11 ASSESSMENT — ENCOUNTER SYMPTOMS
VOMITING: 0
DOUBLE VISION: 0
DEPRESSION: 0
HEADACHES: 0
ABDOMINAL PAIN: 0
NAUSEA: 0
DIARRHEA: 0
SORE THROAT: 0
COUGH: 0
SEIZURES: 1

## 2020-11-11 ASSESSMENT — FIBROSIS 4 INDEX: FIB4 SCORE: 0.82

## 2020-11-11 NOTE — PROGRESS NOTES
"Subjective:      Sarita Loera is a 54 y.o. female who presents with Follow-Up (intractaable seizure disorder)          HPI  Seizures were reported to be occurring about one time every 2 weeks at the last appointment.      Seizure occurring every 5-7 days, approximately 20 known seizures in the past 5-6 months.  In the last 6 months it seems like she is having 2-3 in a row.  If her  is present then he tries to keep her awake.  Using the magnet sometimes and given lorazepam.  Had a headache in the morning then back to sleep for 2 hours.    Yesterday morning, found up on the toilet in the early morning hours, between 4-8am.    Semiology:  Most common, lip smacking, right hand circling motion-- lasting up to 60 seconds. Sometimes she will yell out \"Oh my god\" or \"Oh no\" which awakens her .      Tremor:  Most noteworthy when out of the home.  Tends to be aggravated with anxiety.  Head danitza is most apparent when she has the tremor but will simply try to hold her head still and recognize that it is anxiety triggered.    Updated on flu shot and mammogram is normal.     Vagus Nerve Stimulator not interrogated: still notices \"a little pain in the neck\" and with a voice change which is sometimes happening.  Sometimes it is an extreme sensation and other times and days not an issue at all.     Output Current (milliamps): 1.50  Signal Frequency (Hertz): 20  Pulse Width (Microseconds): 250  Signal On Time (seconds): 30  Signal Off Time (minutes): 5.0 -- 3.0  Magnet Current (milliamps): 1.75  Magnet On Time (seconds): 500  Magnet Pulse Width (microseconds): 60     Autostim: 1.625  Pulse Width: 500  Signal On Time: 30    Tachy Det: OFF  Heartbeat: 4  Threshold: 60%     Imp Value:   IFI      Lead Imp: 3270 ohms  Generator Battery: %    Stim per day:  Autostim: 0  Normal stim: 261  Magnet: 0.02     Trazodone 1/2 prn nightly  Told through the pharmacy that she could not get the lamotrigine 25mg tablets and " thus has not further titrated.      11/2017  INTERPRETATION:  This is an abnormal video EEG recording in the awake, and drowsy state(s). Frequent left anterior temporal sharps and intermittent left anterior temporal slowing. The findings increase risk for seizures and suggest underlying area of cortical irritability and structural abnormality. Clinical and radiological correlation is recommended.    11/17/2017 IMPRESSION:     1.  No evidence of acute territorial infarct, intracranial hemorrhage or mass lesion.  2.  Rare scattered nonspecific periventricular foci of T2 and FLAIR signal hyperintensities consistent with microangiopathic ischemic change versus demyelination or gliosis.      Ref. Range 8/16/2017 10:00 11/17/2017 12:12 11/20/2017 16:49 2/3/2020 15:12   Lamotrigine Latest Ref Range: 2.5 - 15.0 ug/mL 9.5     9.4   25-Hydroxy   Vitamin D 25 Latest Ref Range: 30 - 100 ng/mL 36.7     39     Current Outpatient Medications   Medication Sig Dispense Refill   • eletriptan (RELPAX) 40 MG tablet TAKE 1/2 TO 1 TABLET BY MOUTH AT ONSET OF HEADACHE. MAY REPEAT DOSE IN 2 HOURS IF UNRELIEVED. DO NOT EXCEED MORE THAN 2 TABLETS IN 24 HOURS 27 Tab 1   • LORazepam (ATIVAN) 1 MG Tab TAKE 1/2 TO 1 TABLET BY MOUTH AS NEEDED FOR BREAKTHROUGH SEIZURES. MAX 2 TABLETS PER DAY UNLESS OTHERWISE DIRECTED. 30 DAY SUPPLY 30 Tab 0   • LamoTRIgine 300 MG TABLET SR 24 HR TAKE 1 TABLET BY MOUTH TWICE DAILY 180 Tab 1   • lamoTRIgine (LAMICTAL) 25 MG Tab Take one tablet po qam X 2 weeks then two tablets po qam X 2 weeks then 3 tablets po qam X 2 weeks then 4 tablets po qam thereafter. 90 Tab 1   • omeprazole (PRILOSEC) 40 MG delayed-release capsule 1 po QD       No current facility-administered medications for this visit.          Review of Systems   Constitutional: Negative.    HENT: Negative for hearing loss, nosebleeds and sore throat.         No recent head injury.   Eyes: Negative for double vision.        No new loss of vision.    Respiratory: Negative for cough.         No recent lung infections.   Cardiovascular: Negative for chest pain.   Gastrointestinal: Negative for abdominal pain, diarrhea, nausea and vomiting.   Genitourinary: Negative.    Musculoskeletal: Positive for neck pain.   Skin: Negative.    Neurological: Positive for seizures. Negative for headaches.   Endo/Heme/Allergies:        No history of endocrine dysfunction.  No new problems.   Psychiatric/Behavioral: Negative for depression. The patient is nervous/anxious.         No recent mood changes.          Objective:     LMP 11/09/2008 (Approximate)      Physical Exam  Constitutional:       Appearance: She is well-developed.   HENT:      Head: Normocephalic and atraumatic.      Nose: Nose normal.   Eyes:      Pupils: Pupils are equal, round, and reactive to light.   Neck:      Musculoskeletal: Normal range of motion.   Cardiovascular:      Rate and Rhythm: Normal rate and regular rhythm.   Pulmonary:      Effort: Pulmonary effort is normal.   Musculoskeletal: Normal range of motion.   Skin:     General: Skin is warm.      Coloration: Skin is pale.   Neurological:      Mental Status: She is alert and oriented to person, place, and time.      Motor: No abnormal muscle tone.      Gait: Gait normal.      Comments: No observable changes in neurologic status.  See initial new patient examination for details.    Psychiatric:         Mood and Affect: Mood is anxious.         Behavior: Behavior is hyperactive.         Cognition and Memory: Memory is impaired.               Assessment/Plan:        Seizure disorder, left anterior temporal onset:  History of seizures suggestive of complex partial seizures with secondary generalization since 2004.  Only AED prescribed is lamotrigine and Lamictal.     Increase in seizures in the past year partially attributed to change from Lamictal to generic lamotrigine due to insurance.  Intercurrently, seizures are primarily nocturnal between 4-8am  and complex partial in nature.     Intercurrently was unable to increase the Lamotrigine as recommended.     11/2017 EEG INTERPRETATION:  This is an abnormal video EEG recording in the awake, and drowsy state(s). Frequent left anterior temporal sharps and intermittent left anterior temporal slowing. The findings increase risk for seizures and suggest underlying area of cortical irritability and structural abnormality. Clinical and radiological correlation is recommended.     Lengthy conversation regarding uptake in seizures.  Consider Depakote? Onfi? Vimpat?   Failed Fycompa  Failed gabapentin     Continue LTG ER 300mg BID with further dose increase using a lamotrigine 25mg tablet prescription.  Will titrated further to goal of LTG 300mg BID + 100mg qam.     Continue VNS with change in Signal Off Time (minutes): 5.0 -- 3.0.  She was to return to the office if needing to adjust back to 5 minutes.     Sleep disturbance:  Continue trazodone as prescribed. Discussed her lack of sleep at length.  I'd like to adjust the LTG dosing to be greater with the morning dose which we can do in a few weeks if indicated.     Obtain labs as ordered.  We plan to optimize dosing of LTG with this next set of labs.     Consider repeat lamotrigine level as a comparison in 3-4 months from now.     Recently provided ativan 1mg tablets.  Counseled regarding appropriate usage, to take after the first seizure in the hopes of avoiding a cluster.      Follow-up: Return in 4 months or sooner if needed.     EDUCATION AND COUNSELING:  -Education was provided to the patient and/or family regarding diagnosis and prognosis. The chronic and unpredictable nature of the condition was discussed. There is increased risk for additional events, which may carry potential for significant injuries and death.    -We reviewed the current antiepileptic regimen. Potential side effects of antiepileptics were discussed at length, including but no limited to:  hypersensitivity reactions (rash and others, some of which can be fatal), visual field changes (some of which may be irreversible), glaucoma, diplopia, kidney stones, osteopenia/osteoporosis/bone fractures, hyperthermia/anhydrosis, tremors, ataxia, dizziness, fatigue, increased risk for falls, cardiac arrhythmias/syncope, gastrointestinal (hepatitis, pancreatitis, gastritis, ulcers), gingival hypertrophy, drowsiness, sedation, anxiety/nervousness, increased risk for suicide, increased risk for depression, and psychosis. We reviewed drug-drug interactions and their potential effect on seizure control and medication side effects.    -Patient/family educated on SUDEP (Sudden Death in Epilepsy). Counseling was provided on the importance of strict medication and follow up compliance. The patient understands the risks associated with non-adherence with the medical plan as outlined, including but not limited to an increased risk for breakthrough seizures, which may contribute to injuries, disability, status epilepticus, and even death.    -Counseling was also provided on potential effects of alcohol and other drugs, which may lower seizure threshold and/or affect the metabolism of antiepileptic drugs. I recommend avoidance of alcohol and illegal drugs.  -Recommend proper hydration, regular exercise, proper sleep hygiene (7-8 hrs of overnight sleep, avoid sleep deprivation).    -I have made the patient aware of mandatory reporting required by the law in the [place] regarding episodes of seizures, loss of consciousness, and/or alteration of awareness. The patient and family are responsible for reporting events to the DMV, instructions were provided. The patient verbalized understanding and states she has not been driving. Other seizure precautions were discussed at length, including no diving, no skydiving, no unsupervised swimming, no Jacuzzi or bathing in bathtubs.    Pt and  agrees with plan.

## 2020-11-12 ASSESSMENT — ENCOUNTER SYMPTOMS
NECK PAIN: 1
NERVOUS/ANXIOUS: 1
CONSTITUTIONAL NEGATIVE: 1

## 2020-12-14 DIAGNOSIS — G40.909 SEIZURE DISORDER (HCC): ICD-10-CM

## 2020-12-14 RX ORDER — LORAZEPAM 1 MG/1
TABLET ORAL
Qty: 30 TAB | Refills: 0 | Status: SHIPPED | OUTPATIENT
Start: 2020-12-14 | End: 2021-03-14

## 2021-06-16 DIAGNOSIS — G40.909 SEIZURE DISORDER (HCC): ICD-10-CM

## 2021-06-16 RX ORDER — LAMOTRIGINE 300 MG/1
TABLET, EXTENDED RELEASE ORAL
Qty: 180 TABLET | Refills: 0 | Status: SHIPPED | OUTPATIENT
Start: 2021-06-16 | End: 2021-06-21 | Stop reason: SDUPTHER

## 2021-06-16 RX ORDER — LAMOTRIGINE 25 MG/1
25 TABLET ORAL 2 TIMES DAILY
Qty: 180 TABLET | Refills: 0 | Status: SHIPPED | OUTPATIENT
Start: 2021-06-16 | End: 2021-06-21 | Stop reason: SDUPTHER

## 2021-06-16 NOTE — TELEPHONE ENCOUNTER
Patient is requesting refills of her medications prior to [facility] leaving [facility].     Patient also mentioned Lorazapam needed refilled to be taken as needed.     Also would like information regarding [facility]'s departure as well as who may be a good fit moving forward.       Received request via: Patient    Was the patient seen in the last year in this department? Yes    Does the patient have an active prescription (recently filled or refills available) for medication(s) requested? Yes.

## 2021-08-06 ENCOUNTER — OFFICE VISIT (OUTPATIENT)
Dept: NEUROLOGY | Facility: MEDICAL CENTER | Age: 56
End: 2021-08-06
Attending: STUDENT IN AN ORGANIZED HEALTH CARE EDUCATION/TRAINING PROGRAM
Payer: COMMERCIAL

## 2021-08-06 VITALS
WEIGHT: 173 LBS | DIASTOLIC BLOOD PRESSURE: 58 MMHG | HEART RATE: 89 BPM | HEIGHT: 69 IN | TEMPERATURE: 97.7 F | OXYGEN SATURATION: 99 % | BODY MASS INDEX: 25.62 KG/M2 | SYSTOLIC BLOOD PRESSURE: 108 MMHG

## 2021-08-06 DIAGNOSIS — M47.812 CERVICAL SPONDYLOSIS: ICD-10-CM

## 2021-08-06 DIAGNOSIS — Z12.31 ENCOUNTER FOR SCREENING MAMMOGRAM FOR BREAST CANCER: ICD-10-CM

## 2021-08-06 DIAGNOSIS — G40.919 INTRACTABLE SEIZURE DISORDER (HCC): ICD-10-CM

## 2021-08-06 DIAGNOSIS — G47.9 SLEEP DISTURBANCE: ICD-10-CM

## 2021-08-06 DIAGNOSIS — F41.9 ANXIETY: ICD-10-CM

## 2021-08-06 DIAGNOSIS — Z96.89 STATUS POST PLACEMENT OF VNS (VAGUS NERVE STIMULATION) DEVICE: ICD-10-CM

## 2021-08-06 DIAGNOSIS — G40.909 SEIZURE DISORDER (HCC): ICD-10-CM

## 2021-08-06 DIAGNOSIS — G47.9 SLEEP DISORDER: ICD-10-CM

## 2021-08-06 PROBLEM — M54.2 CERVICALGIA: Status: ACTIVE | Noted: 2017-09-28

## 2021-08-06 PROBLEM — M48.02 SPINAL STENOSIS IN CERVICAL REGION: Status: ACTIVE | Noted: 2021-08-06

## 2021-08-06 PROCEDURE — 99215 OFFICE O/P EST HI 40 MIN: CPT | Performed by: STUDENT IN AN ORGANIZED HEALTH CARE EDUCATION/TRAINING PROGRAM

## 2021-08-06 PROCEDURE — 99417 PROLNG OP E/M EACH 15 MIN: CPT | Performed by: STUDENT IN AN ORGANIZED HEALTH CARE EDUCATION/TRAINING PROGRAM

## 2021-08-06 PROCEDURE — 99211 OFF/OP EST MAY X REQ PHY/QHP: CPT | Performed by: STUDENT IN AN ORGANIZED HEALTH CARE EDUCATION/TRAINING PROGRAM

## 2021-08-06 RX ORDER — CLOBAZAM 10 MG/1
20 TABLET ORAL DAILY
Qty: 60 TABLET | Refills: 0 | Status: SHIPPED | OUTPATIENT
Start: 2021-08-06 | End: 2021-09-13

## 2021-08-06 RX ORDER — LORAZEPAM 1 MG/1
1 TABLET ORAL EVERY 4 HOURS PRN
COMMUNITY
End: 2021-09-20 | Stop reason: SDUPTHER

## 2021-08-06 RX ORDER — TRAZODONE HYDROCHLORIDE 100 MG/1
100 TABLET ORAL NIGHTLY
COMMUNITY
End: 2021-09-20

## 2021-08-06 ASSESSMENT — ENCOUNTER SYMPTOMS
NECK PAIN: 1
NAUSEA: 0
COUGH: 0
HEADACHES: 0
DIARRHEA: 0
ABDOMINAL PAIN: 0
SORE THROAT: 0
DEPRESSION: 0
CONSTITUTIONAL NEGATIVE: 1
SEIZURES: 1
VOMITING: 0
NERVOUS/ANXIOUS: 1
DOUBLE VISION: 0

## 2021-08-06 ASSESSMENT — FIBROSIS 4 INDEX: FIB4 SCORE: 0.83

## 2021-08-06 NOTE — PROGRESS NOTES
"Subjective:      Sarita Loera is a 54 y.o. female who presents with seizures (previously a patient of Crystal Gibbs)        HPI  Per Crystal Gibbs and which I have confirmed to be true  \"Seizures were reported to be occurring about one time every 2 weeks at the last appointment.      Seizure occurring every 5-7 days, approximately 20 known seizures in the past 5-6 months.  In the last 6 months it seems like she is having 2-3 in a row.  If her  is present then he tries to keep her awake.  Using the magnet sometimes and given lorazepam.  Had a headache in the morning then back to sleep for 2 hours.    Yesterday morning, found up on the toilet in the early morning hours, between 4-8am.    Semiology:  Most common, lip smacking, right hand circling motion-- lasting up to 60 seconds. Sometimes she will yell out \"Oh my god\" or \"Oh no\" which awakens her .      Tremor:  Most noteworthy when out of the home.  Tends to be aggravated with anxiety.  Head danitza is most apparent when she has the tremor but will simply try to hold her head still and recognize that it is anxiety triggered.    Updated on flu shot and mammogram is normal.     Vagus Nerve Stimulator not interrogated: still notices \"a little pain in the neck\" and with a voice change which is sometimes happening.  Sometimes it is an extreme sensation and other times and days not an issue at all.     Output Current (milliamps): 1.50  Signal Frequency (Hertz): 20  Pulse Width (Microseconds): 250  Signal On Time (seconds): 30  Signal Off Time (minutes): 5.0 -- 3.0  Magnet Current (milliamps): 1.75  Magnet On Time (seconds): 500  Magnet Pulse Width (microseconds): 60     Autostim: 1.625  Pulse Width: 500  Signal On Time: 30    Tachy Det: OFF  Heartbeat: 4  Threshold: 60%     Imp Value:   IFI      Lead Imp: 3270 ohms  Generator Battery: %    Stim per day:  Autostim: 0  Normal stim: 261  Magnet: 0.02     Trazodone 1/2 prn nightly  Told through the " pharmacy that she could not get the lamotrigine 25mg tablets and thus has not further titrated.      11/2017  INTERPRETATION:  This is an abnormal video EEG recording in the awake, and drowsy state(s). Frequent left anterior temporal sharps and intermittent left anterior temporal slowing. The findings increase risk for seizures and suggest underlying area of cortical irritability and structural abnormality. Clinical and radiological correlation is recommended.    11/17/2017 IMPRESSION:     1.  No evidence of acute territorial infarct, intracranial hemorrhage or mass lesion.  2.  Rare scattered nonspecific periventricular foci of T2 and FLAIR signal hyperintensities consistent with microangiopathic ischemic change versus demyelination or gliosis.      Ref. Range 8/16/2017 10:00 11/17/2017 12:12 11/20/2017 16:49 2/3/2020 15:12   Lamotrigine Latest Ref Range: 2.5 - 15.0 ug/mL 9.5     9.4   25-Hydroxy   Vitamin D 25 Latest Ref Range: 30 - 100 ng/mL 36.7     39       INTERVAL HISTORY:  • Seizures started 2003/2004  • Nocturnal convulsive seizures. Gotten worse last 8 months. Now 1-4 per month  • Semiology:  o 1 convulsive. Right rhythmic hand movements  o GTCs - last was 2 months. Has 5-10 per year. Usually has seizures in clusters. 2-3 days post-ictal state - worn out, sleeping a lot.  o AED: lamictal 325 mg BID.   o Lorazepam 1 mg prn - uses twice per month  o Trazodone - take for sleep.  o Sleeps 5-8 hours. afraid to go to sleep because . Rarely snores  o Headaches-start at top of head, slow buildup and then spread to whole head. Stabbing. Takes Tylenol and occasionally. 6-8/10 in pain. More in the morning. NO n/v. NO photophonoboia.   o Smoking 1 PPD.  o Marijuana daily - helps with anxiety       Current Outpatient Medications   Medication Sig Dispense Refill   • traZODone (DESYREL) 100 MG Tab Take 100 mg by mouth every evening.     • Acetaminophen 325 MG Cap Tylenol     • LORazepam (ATIVAN) 1 MG Tab Take 1 mg by  "mouth every four hours as needed for Anxiety.     • clobazam (ONFI) 10 MG Tab tablet Take 2 Tablets by mouth every day for 30 days. 60 tablet 0   • eletriptan (RELPAX) 40 MG tablet TAKE 1/2 TO 1 TABLET BY MOUTH AT ONSET OF HEADACHE. MAY REPEAT DOSE IN 2 HOURS IF UNRELIEVED. DO NOT EXCEED MORE THAN 2 TABLETS IN 24 HOURS 27 tablet 0   • lamoTRIgine (LAMICTAL) 25 MG Tab Take 1 tablet by mouth 2 times a day. 180 tablet 0   • LamoTRIgine 300 MG TABLET SR 24 HR TAKE 1 TABLET BY MOUTH TWICE DAILY 180 tablet 0   • omeprazole (PRILOSEC) 40 MG delayed-release capsule 1 po QD (Patient not taking: Reported on 8/6/2021)       No current facility-administered medications for this visit.         Review of Systems   Constitutional: Negative.    HENT: Negative for hearing loss, nosebleeds and sore throat.         No recent head injury.   Eyes: Negative for double vision.        No new loss of vision.   Respiratory: Negative for cough.         No recent lung infections.   Cardiovascular: Negative for chest pain.   Gastrointestinal: Negative for abdominal pain, diarrhea, nausea and vomiting.   Genitourinary: Negative.    Musculoskeletal: Positive for neck pain.   Skin: Negative.    Neurological: Positive for seizures. Negative for headaches.   Endo/Heme/Allergies:        No history of endocrine dysfunction.  No new problems.   Psychiatric/Behavioral: Negative for depression. The patient is nervous/anxious.         No recent mood changes.          Objective:     /58   Pulse 89   Temp 36.5 °C (97.7 °F)   Ht 1.753 m (5' 9\")   Wt 78.5 kg (173 lb)   LMP 11/09/2008 (Approximate)   SpO2 99%   BMI 25.55 kg/m²      Physical Exam  Constitutional:       Appearance: She is well-developed.   HENT:      Head: Normocephalic and atraumatic.      Nose: Nose normal.   Eyes:      Pupils: Pupils are equal, round, and reactive to light.   Cardiovascular:      Rate and Rhythm: Normal rate and regular rhythm.   Pulmonary:      Effort: Pulmonary " effort is normal.   Musculoskeletal:         General: Normal range of motion.      Cervical back: Normal range of motion.   Skin:     General: Skin is warm.      Coloration: Skin is pale.   Neurological:      Mental Status: She is alert and oriented to person, place, and time.      Motor: No abnormal muscle tone.      Gait: Gait normal.      Comments: No observable changes in neurologic status.  See initial new patient examination for details.    Psychiatric:         Mood and Affect: Mood is anxious.         Behavior: Behavior is hyperactive.         Cognition and Memory: Memory is impaired.               Assessment/Plan:        Seizure disorder, left anterior temporal onset:  History of seizures suggestive of complex partial seizures with secondary generalization since 2004.  Only AED prescribed is lamotrigine. She is uncontrolled. Adding Onfi 20 mg QHS. Consider vimpat in the future if needed.    Getting updated eeg with 48 hour ambulatory eeg. Getting updated MRI epilepsy protocol. Labs as below as well.  C/w ativan PRN    Will need to continue to address sleep issues, smoking, and headaches which may or may not relate to her seizures.        Encounter Diagnoses   Name Primary?   • Encounter for screening mammogram for breast cancer    • Intractable seizure disorder (HCC)    • Seizure disorder (HCC)    • Chronic migraine    • Status post placement of VNS (vagus nerve stimulation) device    • Sleep disturbance    • Sleep disorder    • Anxiety    • Cervical spondylosis      Orders Placed This Encounter   • MA-SCREENING MAMMO BILAT W/CAD   • MR-BRAIN-WITH & W/O   • CBC WITH DIFFERENTIAL   • Comp Metabolic Panel   • LAMOTRIGINE   • REFERRAL TO NEURODIAGNOSTICS (EEG,EP,EMG/NCS/DBS)   • traZODone (DESYREL) 100 MG Tab   • Acetaminophen 325 MG Cap   • LORazepam (ATIVAN) 1 MG Tab   • clobazam (ONFI) 10 MG Tab tablet       Follow-up: f/u 3 weeks virtual     EPILEPSY EDUCATION AND COUNSELING:  [x] Education was provided to  patient and/or family regarding diagnosis and prognosis. The chronic and unpredictable nature of the condition was discussed. There is increased risk for additional events, which may carry potential for significant injuries and death.   [x] Discussed frequent seizure triggers: sleep deprivation, medication non-compliance, use of illegal drugs/alcohol, stress, and others.   [x] We reviewed in detail the current antiepileptic regimen. Potential side effects of antiepileptics were discussed at length, including but no limited to:   • hypersensitivity reactions (rash and others, some of which can be fatal),   • visual field changes (some of which may be irreversible),   • glaucoma,   • diplopia,   • kidney stones,   • osteopenia/osteoporosis/bone fractures,   • hyperthermia/anhydrosis,   • hyponatremia,   • tremors/abnormal movements,   • ataxia,   • dizziness,   • fatigue,   • increased risk for falls,   • risk for cardiac arrhythmias/syncope,   • gastrointestinal side effects(hepatitis, pancreatitis, gastritis, ulcers),   • gingival hypertrophy/bleeding,   • Drowsiness/sedation,   • anxiety/nervousness, irritability, restlessness  • Other psychiatric side effects: increased risk for suicide, increased risk for depression  • Potential for causing psychosis.   [x] Reviewed drug-drug interactions and their potential effect on seizure control   [x] Discussed potential effects of seizures, epilepsy, and medications during pregnancy, including but not limited to fetal malformations, child developmental/intellectual disability, fetal/ risk for hemorrhages, stillbirth, maternal death, premature birth, and others. The patient/family aware that pregnancy should be avoided, unless desired, in which case we recommend discussing with us at least a year prior to planned conception. To avoid undesired pregnancy while on antiepileptics, we recommend dual contraception.   [x] Folic acid 2 mg is recommended for all females in  childbearing age (12-44 years of age).   [x] Recommend chronic vitamin D supplementation   [x] Recommend regular exercise (if not contraindicated).   [x] Educated on risk for SUDEP (Sudden Death in Epilepsy). Counseling was provided on the importance of strict medication and follow up compliance. The patient/family understand the risks associated with non-adherence with the medical plan as outlined, including but not limited to an increased risk for breakthrough seizures, which may contribute to injuries, disability, status epilepticus, and even death.   [x] Counseling was also provided on potential effects of alcohol and other drugs, which may lower seizure threshold and/or affect the metabolism of antiepileptic drugs. We recommend avoidance of alcohol and illegal drugs.  [x] Discussed aspects related to safety in drivers who suffer from epilepsy. The patient is encourage to report to the Division of Motor Vehicles of any condition and/or spells related to confusion, disorientation, and/or loss of awareness and/or loss of consciousness; as these may pose a safety issue if they occur while operating a motor vehicle. The patient and/or family are ultimately responsible for exercising caution and abiding to regulations in place.   [x] Other seizure precautions were discussed at length, including  no skydiving, no climbing or exposure to unprotected heights, no unsupervised swimming, no Jacuzzi or bathing in bathtubs or deep bodies of water.   [x]Counseled about risks for operating any machinery while suffering from seizures / syncope / epilepsy and/or while taking antiepileptic drugs.   [x] Counseled that due to the complexity of his/her diagnosis, results of any testing and further recommendations will typically be discussed/made during a face to face encounter in my office.   [x] Discussed that patient and/or family further understands it is their responsibility to keep proper follow up.     Patient/family agree  with plan, as outlined.        Douglas Charles MD  Epilepsy and General Neurology  Department of Neurology  Instructor of Clinical Neurology [facility].       BILLING DOCUMENTATION:       Counseling:  I spent a total of 65 minutes of face-to-face time in this visit. Over 50% of the time of the visit today was spent on counseling and or coordination of care wtih the patient and/or family, as above in assessment in plan.

## 2021-09-10 DIAGNOSIS — G40.919 INTRACTABLE SEIZURE DISORDER (HCC): ICD-10-CM

## 2021-09-13 RX ORDER — CLOBAZAM 10 MG/1
TABLET ORAL
Qty: 120 TABLET | Refills: 0 | Status: SHIPPED | OUTPATIENT
Start: 2021-09-13 | End: 2021-09-20

## 2021-09-20 ENCOUNTER — TELEMEDICINE (OUTPATIENT)
Dept: NEUROLOGY | Facility: MEDICAL CENTER | Age: 56
End: 2021-09-20
Attending: STUDENT IN AN ORGANIZED HEALTH CARE EDUCATION/TRAINING PROGRAM
Payer: COMMERCIAL

## 2021-09-20 DIAGNOSIS — G47.9 SLEEP DISTURBANCE: ICD-10-CM

## 2021-09-20 DIAGNOSIS — Z91.89 AT RISK FOR OSTEOPENIA: ICD-10-CM

## 2021-09-20 DIAGNOSIS — G40.919 INTRACTABLE SEIZURE DISORDER (HCC): ICD-10-CM

## 2021-09-20 DIAGNOSIS — F41.9 ANXIETY: ICD-10-CM

## 2021-09-20 DIAGNOSIS — G47.9 SLEEP DISORDER: ICD-10-CM

## 2021-09-20 DIAGNOSIS — G40.909 SEIZURE DISORDER (HCC): ICD-10-CM

## 2021-09-20 PROCEDURE — 99214 OFFICE O/P EST MOD 30 MIN: CPT | Mod: 95 | Performed by: STUDENT IN AN ORGANIZED HEALTH CARE EDUCATION/TRAINING PROGRAM

## 2021-09-20 RX ORDER — LORAZEPAM 1 MG/1
1 TABLET ORAL
Qty: 15 TABLET | Refills: 2 | Status: SHIPPED | OUTPATIENT
Start: 2021-09-20 | End: 2021-12-19

## 2021-09-20 RX ORDER — CLOBAZAM 20 MG/1
30 TABLET ORAL DAILY
Qty: 45 TABLET | Refills: 2 | Status: SHIPPED | OUTPATIENT
Start: 2021-09-20 | End: 2022-01-03

## 2021-09-20 NOTE — PROGRESS NOTES
"NEUROLOGY CLINIC FOLLOW-UP    Telemedicine: Established Patient   This evaluation was conducted via Zoom using secure and encrypted videoconferencing technology. The patient was in a private location in the [facility] [place].    The patient's identity was confirmed and verbal consent was obtained for this virtual visit.      Subjective:          Sarita Loera is a 54 y.o. female who presents for follow-up with seizures (previously a patient of Crystal Gibbs)        HPI  Per Crystal Gibbs and which I have confirmed to be true and per my initial encounter:  \"Seizures were reported to be occurring about one time every 2 weeks at the last appointment.      Seizure occurring every 5-7 days, approximately 20 known seizures in the past 5-6 months.  In the last 6 months it seems like she is having 2-3 in a row.  If her  is present then he tries to keep her awake.  Using the magnet sometimes and given lorazepam.  Had a headache in the morning then back to sleep for 2 hours.    Yesterday morning, found up on the toilet in the early morning hours, between 4-8am.    Semiology:  Most common, lip smacking, right hand circling motion-- lasting up to 60 seconds. Sometimes she will yell out \"Oh my god\" or \"Oh no\" which awakens her .      Tremor:  Most noteworthy when out of the home.  Tends to be aggravated with anxiety.  Head danitza is most apparent when she has the tremor but will simply try to hold her head still and recognize that it is anxiety triggered.    Updated on flu shot and mammogram is normal.     Vagus Nerve Stimulator not interrogated: still notices \"a little pain in the neck\" and with a voice change which is sometimes happening.  Sometimes it is an extreme sensation and other times and days not an issue at all.     Output Current (milliamps): 1.50  Signal Frequency (Hertz): 20  Pulse Width (Microseconds): 250  Signal On Time (seconds): 30  Signal Off Time (minutes): 5.0 -- 3.0  Magnet Current " (milliamps): 1.75  Magnet On Time (seconds): 500  Magnet Pulse Width (microseconds): 60     Autostim: 1.625  Pulse Width: 500  Signal On Time: 30    Tachy Det: OFF  Heartbeat: 4  Threshold: 60%     Imp Value:   IFI      Lead Imp: 3270 ohms  Generator Battery: %    Stim per day:  Autostim: 0  Normal stim: 261  Magnet: 0.02     Trazodone 1/2 prn nightly  Told through the pharmacy that she could not get the lamotrigine 25mg tablets and thus has not further titrated.      11/2017  INTERPRETATION:  This is an abnormal video EEG recording in the awake, and drowsy state(s). Frequent left anterior temporal sharps and intermittent left anterior temporal slowing. The findings increase risk for seizures and suggest underlying area of cortical irritability and structural abnormality. Clinical and radiological correlation is recommended.    11/17/2017 IMPRESSION:     1.  No evidence of acute territorial infarct, intracranial hemorrhage or mass lesion.  2.  Rare scattered nonspecific periventricular foci of T2 and FLAIR signal hyperintensities consistent with microangiopathic ischemic change versus demyelination or gliosis.      Ref. Range 8/16/2017 10:00 11/17/2017 12:12 11/20/2017 16:49 2/3/2020 15:12   Lamotrigine Latest Ref Range: 2.5 - 15.0 ug/mL 9.5     9.4   25-Hydroxy   Vitamin D 25 Latest Ref Range: 30 - 100 ng/mL 36.7     39       INTERVAL HISTORY August, 2021:  • Seizures started 2003/2004  • Nocturnal convulsive seizures. Gotten worse last 8 months. Now 1-4 per month  • Semiology:  o 1 convulsive. Right rhythmic hand movements  o GTCs - last was 2 months. Has 5-10 per year. Usually has seizures in clusters. 2-3 days post-ictal state - worn out, sleeping a lot.  o AED: lamictal 325 mg BID.   o Lorazepam 1 mg prn - uses twice per month  o Trazodone - take for sleep.  o Sleeps 5-8 hours. afraid to go to sleep because . Rarely snores  o Headaches-start at top of head, slow buildup and then spread to whole head.  Stabbing. Takes Tylenol and occasionally. 6-8/10 in pain. More in the morning. NO n/v. NO photophonoboia.   o Smoking 1 PPD.  o Marijuana daily - helps with anxiety    INTERVAL HISTORY 09/20/21 :  • Not so great. Seizure a couple nights ago  • August 16, started shaking really bad, called ambulance, given lorazepam and vicodin. She was aware the whole time. Has happened twice before in last 16 years  • Appetite and diet is OK  • Had 2 seizures the past month  • Lamictal 325 mg BID   • COVID-19 shot            Current Outpatient Medications   Medication Sig Dispense Refill   • cloBAZam 20 MG Tab Take 30 mg by mouth every day for 90 days. Take 10 mg (1/2 tab) in AM and 20 mg  (1 full tab) in PM 45 Tablet 2   • LORazepam (ATIVAN) 1 MG Tab Take 1 Tablet by mouth 1 time a day as needed for Anxiety (and seizures) for up to 90 days. 15 Tablet 2   • Acetaminophen 325 MG Cap Tylenol     • eletriptan (RELPAX) 40 MG tablet TAKE 1/2 TO 1 TABLET BY MOUTH AT ONSET OF HEADACHE. MAY REPEAT DOSE IN 2 HOURS IF UNRELIEVED. DO NOT EXCEED MORE THAN 2 TABLETS IN 24 HOURS 27 tablet 0   • lamoTRIgine (LAMICTAL) 25 MG Tab Take 1 tablet by mouth 2 times a day. 180 tablet 0   • LamoTRIgine 300 MG TABLET SR 24 HR TAKE 1 TABLET BY MOUTH TWICE DAILY 180 tablet 0     No current facility-administered medications for this visit.   EXAM: no acute distress, language normlal. Cooperative. Answers questions appropriately    ROS otherwise negative     Objective:     [facility] 11/09/2008 (Approximate)              Assessment/Plan:        Seizure disorder, left anterior temporal onset:  History of seizures suggestive of complex partial seizures with secondary generalization since 2004.  Only AED prescribed is lamotrigine. She is uncontrolled. Increasing Onfi to 10 mg QAM and 20 mg QHS. Consider vimpat in the future if needed.    Pending  updated eeg with 48 hour ambulatory eeg. Defer MRI epilepsy protocol for now. Has pending labs that need to be drawn. Refill  ativan to be used PRN seizures. Increasing Onfi from 10 QAM/20 QPM    Will need to continue to address sleep issues, smoking, and headaches which may or may not relate to her seizures.        Encounter Diagnoses   Name Primary?   • Intractable seizure disorder (HCC)    • Chronic migraine    • Sleep disorder    • Seizure disorder (HCC)    • Sleep disturbance    • At risk for osteopenia    • Anxiety      Orders Placed This Encounter   • cloBAZam 20 MG Tab   • LORazepam (ATIVAN) 1 MG Tab       Follow-up: f/u 3 months         Douglas Charles MD  Epilepsy and General Neurology  Department of Neurology  Instructor of Clinical Neurology [facility].       BILLING DOCUMENTATION:       Counseling:  I spent a total of 30 minutes of face-to-face time in this visit. Over 50% of the time of the visit today was spent on counseling and or coordination of care wtih the patient and/or family, as above in assessment in plan.

## 2021-09-20 NOTE — Clinical Note
Basil Olvera,    Can you please make a f/u appointment with me in clinic in 3 months at the end of December/early Jan after patient's EEG in December? Thanks so much.    Douglas

## 2021-09-20 NOTE — PATIENT INSTRUCTIONS
Neurology Clinic Visit     IMPORTANT: If imaging, procedure(s), AND/or referrals were placed for you:  Contact [facility] Scheduling if you have not heard from them in 5 day: [phone]    Outpatient [facility] Imaging Sites.  • [address]  Mon-Fri 8am-5pm   • [address] (Breast Center) Mon-Fri 7am-4pm    • [address]  Mon-Fri 8am-5pm  • [facility] Radiology 7am-6:30pm  • [address] Mon-Fri 8am-7pm  Sat 9am-6pm   • [address]  Mon-Fri 8am-7pm and Sat/Sun 9am-5pm  • [address]  Mon-Fri 8am-5pm  • [address] (PET/CT)  Mon-Fri 8:30am-4:30pm     If labs were ordered for you:  • To Schedule an appointment for lab services, please call [phone] or visit [email or web address].  • [facility] Lab Services Convenient Locations:    [place]: • [address] (Ground Floor)  • [address] (Admitting Entrance)  • [address]  • [address]  • [address]  • [address]  • [address]: • [address]  • [address]       [place]: • [address]  • [address]     [facility]     [address] [address]     Laboratory Hours: 6:30am - 6pm  Monday - Friday,   7am - 2pm Saturday    [facility] Medical [facility]      [address] [place] [address]      Laboratory Hours:  7:30am - 4pm  Monday - Friday,  9am - 3pm Saturday    [facility]     [address]    [place] [address]      Laboratory Hours:  7:00am - 5:30pm  Monday - Friday    [facility]      [address] [address]       Laboratory Hours:  7:30am - 4:30pm  Monday - Friday    [facility]       [address] [place] [address]       Laboratory Hours:  8am - 5pm  Monday - Friday    [facility]       [address] [place] [id number]        Laboratory hours:  7:30am - 4pm  Monday - Friday    GENERAL REMINDERS:  · Request refills 1 week in advance to ensure you do not run out of medications  · All diagnostic study results will be reviewed at your next visit, UNLESS there are urgent results that need to be acted on sooner.  · Please remember that it is the your responsibility to check with your Insurance for benefit coverage for visit / visits.  · 24 hours notice is required for all appointment changes or cancellation.  · Please arrive 20 min. before your appointment time  · Please note that because Dr. Charles has high daily clinic volume, he is unable to accommodate late arrivals. If you are more than 15 minutes late for the scheduled appointment you will be asked to reschedule  · Please bring the following with you:  1) Picture ID  2) Insurance card  3) An updated list of ALL your medications and their dosages (prescribed medications, over the counter medications, and all supplements), as well as allergies with you at all times  4) A list of questions, concerns, comments that you wish to discuss with Dr. Melvin Charles MD  General Neurologist and Epileptologist  Department of Neurology  Instructor of Clinical Neurology [facility].

## 2021-12-08 ENCOUNTER — NON-PROVIDER VISIT (OUTPATIENT)
Dept: NEUROLOGY | Facility: MEDICAL CENTER | Age: 56
End: 2021-12-08
Attending: PSYCHIATRY & NEUROLOGY
Payer: COMMERCIAL

## 2021-12-08 DIAGNOSIS — G40.909 SEIZURE DISORDER (HCC): ICD-10-CM

## 2021-12-08 DIAGNOSIS — G40.919 INTRACTABLE SEIZURE DISORDER (HCC): ICD-10-CM

## 2021-12-08 PROCEDURE — 95708 EEG WO VID EA 12-26HR UNMNTR: CPT | Performed by: STUDENT IN AN ORGANIZED HEALTH CARE EDUCATION/TRAINING PROGRAM

## 2021-12-08 PROCEDURE — 95700 EEG CONT REC W/VID EEG TECH: CPT | Performed by: STUDENT IN AN ORGANIZED HEALTH CARE EDUCATION/TRAINING PROGRAM

## 2021-12-08 PROCEDURE — 95719 EEG PHYS/QHP EA INCR W/O VID: CPT | Performed by: STUDENT IN AN ORGANIZED HEALTH CARE EDUCATION/TRAINING PROGRAM

## 2021-12-08 NOTE — PROCEDURES
AMBULATORY ELECTROENCEPHALOGRAM REPORT      Referring provider:   Douglas Charles M.D.  [address] [address]  [place] [address]      DOS: 12/08/2021       Duration of Recording: (22 hours and 25 minutes)      INDICATION:  Sarita Loera 55 y.o. female presenting with nocturnal convulsions    CURRENT OUTPATIENT MEDICATION LIST:  Current Outpatient Medications   Medication Instructions   • Acetaminophen 325 MG Cap Tylenol   • cloBAZam 30 mg, Oral, DAILY, Take 10 mg (1/2 tab) in AM and 20 mg  (1 full tab) in PM   • eletriptan (RELPAX) 40 MG tablet TAKE 1/2 TO 1 TABLET BY MOUTH AT ONSET OF HEADACHE. MAY REPEAT DOSE IN 2 HOURS IF UNRELIEVED. DO NOT EXCEED MORE THAN 2 TABLETS IN 24 HOURS   • lamoTRIgine (LAMICTAL) 25 mg, Oral, 2 TIMES DAILY   • LamoTRIgine 300 MG TABLET SR 24 HR TAKE 1 TABLET BY MOUTH TWICE DAILY   • LORazepam (ATIVAN) 1 mg, Oral, 1 TIME DAILY PRN         TECHNIQUE: The EEG was set up and taken down by a Neurodiagnostic technologist who performed education to patient and staff.     A minimum but not limited to 23 electrodes and 23 channel recording was setup and performed by Neurodiagnostic technologist.     Impedances, electrode integrity, and technical impressions were documented a minimum of every 2-24 hour period by a Neurodiagnostic Technologist and reviewed by Interpreting Physician.    DESCRIPTION OF THE RECORD:  During the awake state, background shows symmetrical 9-10 Hz alpha activity posteriorly with amplitude of 70 mV.  There was reactivity with eye opening/closure.  Normal anterior-posterior gradient was noted with faster beta frequencies seen anteriorly.  During drowsiness, high-amplitude delta frequencies were seen.    During the sleep state, background shows diffuse high-amplitude 2-3 Hz delta or theta activity.  Symmetrical high-amplitude sleep spindles and vertex sharp activities were seen in the central leads.    ACTIVATION PROCEDURES:   Hyperventilation was performed by the  patient for a total of 3 minutes. The technician performing the test noted good effort. This technique produced electroencephalographic changes in keeping with the expected bilaterally synchronous, frontally predominant, high amplitude slow waves build up.     Intermittent Photic stimulation was performed in a stepwise fashion from 1 to 30 Hz and elicited a normal response (photic driving), most noticeable in the posterior leads.    ICTAL AND INTERICTAL FINDINGS:   No focal or generalized epileptiform activity noted.     No regional slowing was seen during this routine study.      No clinical events or seizures were reported or recorded during the study.     EKG: sampling of the EKG recording did not demonstrate any abnormalities    EVENTS:  Clinical events:    Day 1   335 PM - head bobbing  457 PM - dizzy, shakes  6:07 PM - tired  6:10 PM - head bobs; headache    Day 2  1120 AM - Headache      INTERPRETATION:  This is a normal ambulatory EEG recording in the awake and drowsy/sleep state(s). No epileptiform discharges, focal asymmetries, or seizures. Clinical event as listed in the body of the report are without EEG correlate.. Clinical correlation is recommended.          Douglas Charles MD  Epilepsy and General Neurology  Department of Neurology  Clinical  of Neurology [facility].

## 2021-12-09 ENCOUNTER — NON-PROVIDER VISIT (OUTPATIENT)
Dept: NEUROLOGY | Facility: MEDICAL CENTER | Age: 56
End: 2021-12-09
Attending: PSYCHIATRY & NEUROLOGY
Payer: COMMERCIAL

## 2021-12-09 DIAGNOSIS — G40.909 SEIZURE DISORDER (HCC): ICD-10-CM

## 2021-12-09 DIAGNOSIS — G40.919 INTRACTABLE SEIZURE DISORDER (HCC): ICD-10-CM

## 2021-12-09 PROCEDURE — 95719 EEG PHYS/QHP EA INCR W/O VID: CPT | Performed by: STUDENT IN AN ORGANIZED HEALTH CARE EDUCATION/TRAINING PROGRAM

## 2021-12-09 PROCEDURE — 95708 EEG WO VID EA 12-26HR UNMNTR: CPT | Performed by: STUDENT IN AN ORGANIZED HEALTH CARE EDUCATION/TRAINING PROGRAM

## 2021-12-09 NOTE — PROCEDURES
AMBULATORY ELECTROENCEPHALOGRAM REPORT      Referring provider:   Douglas Charles M.D.  [address] [address]  [place] [address]      DOS: 12/09/2021       Duration of Recording: (22 hours and 53 minutes)      INDICATION:  Sarita Loera 56 y.o. female presenting with  Nocturnal convulsions  CURRENT OUTPATIENT MEDICATION LIST:  Current Outpatient Medications   Medication Instructions   • Acetaminophen 325 MG Cap Tylenol   • cloBAZam 30 mg, Oral, DAILY, Take 10 mg (1/2 tab) in AM and 20 mg  (1 full tab) in PM   • eletriptan (RELPAX) 40 MG tablet TAKE 1/2 TO 1 TABLET BY MOUTH AT ONSET OF HEADACHE. MAY REPEAT DOSE IN 2 HOURS IF UNRELIEVED. DO NOT EXCEED MORE THAN 2 TABLETS IN 24 HOURS   • lamoTRIgine (LAMICTAL) 25 mg, Oral, 2 TIMES DAILY   • LamoTRIgine 300 MG TABLET SR 24 HR TAKE 1 TABLET BY MOUTH TWICE DAILY   • LORazepam (ATIVAN) 1 mg, Oral, 1 TIME DAILY PRN         TECHNIQUE: The EEG was set up and taken down by a Neurodiagnostic technologist who performed education to patient and staff.     A minimum but not limited to 23 electrodes and 23 channel recording was setup and performed by Neurodiagnostic technologist.     Impedances, electrode integrity, and technical impressions were documented a minimum of every 2-24 hour period by a Neurodiagnostic Technologist and reviewed by Interpreting Physician.    DESCRIPTION OF THE RECORD:  During the awake state, background shows symmetrical 9-10 Hz alpha activity posteriorly with amplitude of 70 mV.  There was reactivity with eye opening/closure.  Normal anterior-posterior gradient was noted with faster beta frequencies seen anteriorly.  During drowsiness, high-amplitude delta frequencies were seen.    During the sleep state, background shows diffuse high-amplitude 2-3 Hz delta or theta activity.  Symmetrical high-amplitude sleep spindles and vertex sharp activities were seen in the central leads.    ACTIVATION PROCEDURES:   NA    ICTAL AND INTERICTAL FINDINGS:    No focal or generalized epileptiform activity noted.     No regional slowing was seen during this routine study.      No clinical events or seizures were reported or recorded during the study.     EKG: sampling of the EKG showed bradycardia at times    EVENTS:  None    INTERPRETATION:  This is a  normal ambulatory EEG recording in the awake and drowsy/sleep state(s).  No epileptiform discharges, focal asymmetries, or seizures. No clinical events.. Clinical correlation is recommended.          Douglas Charles MD  Epilepsy and General Neurology  Department of Neurology  Clinical  of Neurology [facility].

## 2021-12-10 ENCOUNTER — NON-PROVIDER VISIT (OUTPATIENT)
Dept: NEUROLOGY | Facility: MEDICAL CENTER | Age: 56
End: 2021-12-10
Attending: PSYCHIATRY & NEUROLOGY
Payer: COMMERCIAL

## 2022-01-03 DIAGNOSIS — G40.919 INTRACTABLE SEIZURE DISORDER (HCC): ICD-10-CM

## 2022-01-03 RX ORDER — CLOBAZAM 20 MG/1
TABLET ORAL
Qty: 45 TABLET | Refills: 2 | Status: SHIPPED | OUTPATIENT
Start: 2022-01-03 | End: 2022-04-03

## 2022-01-03 NOTE — TELEPHONE ENCOUNTER
Received request via: Pharmacy    Was the patient seen in the last year in this department? Yes    Does the patient have an active prescription (recently filled or refills available) for medication(s) requested? No  
91

## 2022-04-20 ENCOUNTER — OFFICE VISIT (OUTPATIENT)
Dept: NEUROLOGY | Facility: MEDICAL CENTER | Age: 57
End: 2022-04-20
Attending: STUDENT IN AN ORGANIZED HEALTH CARE EDUCATION/TRAINING PROGRAM
Payer: COMMERCIAL

## 2022-04-20 VITALS
WEIGHT: 184 LBS | DIASTOLIC BLOOD PRESSURE: 74 MMHG | HEIGHT: 69 IN | TEMPERATURE: 98.4 F | HEART RATE: 75 BPM | BODY MASS INDEX: 27.25 KG/M2 | OXYGEN SATURATION: 98 % | SYSTOLIC BLOOD PRESSURE: 124 MMHG

## 2022-04-20 DIAGNOSIS — G40.919 INTRACTABLE SEIZURE DISORDER (HCC): ICD-10-CM

## 2022-04-20 DIAGNOSIS — G47.9 SLEEP DISTURBANCE: ICD-10-CM

## 2022-04-20 DIAGNOSIS — R25.1 TREMOR: ICD-10-CM

## 2022-04-20 DIAGNOSIS — F33.1 MODERATE EPISODE OF RECURRENT MAJOR DEPRESSIVE DISORDER (HCC): ICD-10-CM

## 2022-04-20 DIAGNOSIS — Z96.89 STATUS POST PLACEMENT OF VNS (VAGUS NERVE STIMULATION) DEVICE: ICD-10-CM

## 2022-04-20 DIAGNOSIS — Z91.89 AT RISK FOR OSTEOPENIA: ICD-10-CM

## 2022-04-20 DIAGNOSIS — F41.9 ANXIETY: ICD-10-CM

## 2022-04-20 DIAGNOSIS — M47.812 CERVICAL SPONDYLOSIS: ICD-10-CM

## 2022-04-20 DIAGNOSIS — G47.9 SLEEP DISORDER: ICD-10-CM

## 2022-04-20 PROCEDURE — 99212 OFFICE O/P EST SF 10 MIN: CPT | Performed by: STUDENT IN AN ORGANIZED HEALTH CARE EDUCATION/TRAINING PROGRAM

## 2022-04-20 PROCEDURE — 99215 OFFICE O/P EST HI 40 MIN: CPT | Mod: 25 | Performed by: STUDENT IN AN ORGANIZED HEALTH CARE EDUCATION/TRAINING PROGRAM

## 2022-04-20 PROCEDURE — 95976 ALYS SMPL CN NPGT PRGRMG: CPT | Performed by: STUDENT IN AN ORGANIZED HEALTH CARE EDUCATION/TRAINING PROGRAM

## 2022-04-20 PROCEDURE — 99417 PROLNG OP E/M EACH 15 MIN: CPT | Performed by: STUDENT IN AN ORGANIZED HEALTH CARE EDUCATION/TRAINING PROGRAM

## 2022-04-20 RX ORDER — DICYCLOMINE HCL 20 MG
TABLET ORAL
COMMUNITY
End: 2022-08-16

## 2022-04-20 RX ORDER — HYDROCODONE BITARTRATE AND ACETAMINOPHEN 7.5; 325 MG/1; MG/1
TABLET ORAL
COMMUNITY
End: 2023-04-05

## 2022-04-20 RX ORDER — LORAZEPAM 0.5 MG/1
TABLET ORAL
COMMUNITY
End: 2022-06-17 | Stop reason: SDUPTHER

## 2022-04-20 RX ORDER — CLOBAZAM 20 MG/1
TABLET ORAL
Qty: 180 TABLET | Refills: 0 | Status: SHIPPED | OUTPATIENT
Start: 2022-04-20 | End: 2022-07-20

## 2022-04-20 RX ORDER — OMEPRAZOLE 40 MG/1
CAPSULE, DELAYED RELEASE ORAL
COMMUNITY
End: 2023-04-05

## 2022-04-20 RX ORDER — LAMOTRIGINE 300 MG/1
TABLET, EXTENDED RELEASE ORAL
COMMUNITY
End: 2022-08-16

## 2022-04-20 ASSESSMENT — PATIENT HEALTH QUESTIONNAIRE - PHQ9: CLINICAL INTERPRETATION OF PHQ2 SCORE: 0

## 2022-04-20 NOTE — PATIENT INSTRUCTIONS
Neurology Clinic Visit     IMPORTANT: If imaging, procedure(s), AND/or referrals were placed for you:  Contact [facility] Scheduling if you have not heard from them in 5 day: [phone]    Outpatient [facility] Imaging Sites.  • [address]  Mon-Fri 8am-5pm   • [address] (Breast Center) Mon-Fri 7am-4pm    • [address]  Mon-Fri 8am-5pm  • [facility] Radiology 7am-6:30pm  • [address] Mon-Fri 8am-7pm  Sat 9am-6pm   • [address]  Mon-Fri 8am-7pm and Sat/Sun 9am-5pm  • [address]  Mon-Fri 8am-5pm  • [address] (PET/CT)  Mon-Fri 8:30am-4:30pm     If labs were ordered for you:  • To Schedule an appointment for lab services, please call [phone] or visit [email or web address].  • [facility] Lab Services Convenient Locations:    [place]: • [address] (Ground Floor)  • [address] (Admitting Entrance)  • [address], [address]  • [address]  • [address] [facility], [address]  • [address] [facility] [address]  • [address]: • [address]  • [address]       [place]: • [address]  • [address]     [facility]     [address] [address]     Laboratory Hours: 6:30am - 6pm  Monday - Friday,   7am - 2pm Saturday    [facility]      [address] [place] [address]      Laboratory Hours:  7:30am - 4pm  Monday - Friday,  9am - 3pm Saturday    [facility]     [address]    [place] [address]      Laboratory Hours:  7:00am - 5:30pm  Monday - Friday    [facility]      [address] [address]       Laboratory Hours:  7:30am - 4:30pm  Monday - Friday    [facility]       [address] [place] [address]       Laboratory Hours:  8am - 5pm  Monday - Friday    [facility]       [address] [place] [address]        Laboratory hours:  7:30am - 4pm  Monday - Friday    GENERAL REMINDERS:  · Request refills 1 week in advance to ensure you do not run out of medications  · All diagnostic study results will be reviewed at your next visit, UNLESS there are urgent results that need to be acted on sooner.  · Please remember that it is the your responsibility to check with your Insurance for benefit coverage for visit / visits.  · 24 hours notice is required for all appointment changes or cancellation.  · Please arrive 20 min. before your appointment time  · Please note that because Dr. Charles has high daily clinic volume, he is unable to accommodate late arrivals. If you are more than 15 minutes late for the scheduled appointment you will be asked to reschedule  · Please bring the following with you:  1) Picture ID  2) Insurance card  3) An updated list of ALL your medications and their dosages (prescribed medications, over the counter medications, and all supplements), as well as allergies with you at all times  4) A list of questions, concerns, comments that you wish to discuss with Dr. Melvin Charles MD  General Neurologist and Epileptologist  Department of Neurology  Instructor of Clinical Neurology [facility].

## 2022-04-20 NOTE — PROGRESS NOTES
"NEUROLOGY FOLLOW-UP - 04/20/2022   REFERRING PROVIDER  No referring provider defined for this encounter.    PCP  Ronald Mims   [phone]   [facility] [[id number]]     REASON FOR VISIT: Sarita Loera 56 y.o. female presents today for follow-up. He was last seen by me 1/3/2022     SUMMARY OF PROBLEMS AND/OR DIAGNOSES AS PER MY PRIOR NOTES/ENCOUNTERS:  Seizures started 2003/2004. Likely left frontotemporal onset seizures with impaired awareness -/+ bilateral tonic clonic seizure  Nocturnal convulsive seizures.  Starts as right rhythmic hand shaking       Yesterday morning, found up on the toilet in the early morning hours, between 4-8am.     Semiology:  Most common, lip smacking, right hand circling motion-- lasting up to 60 seconds. Sometimes she will yell out \"Oh my god\" or \"Oh no\" which awakens her .      Tremor:  Most noteworthy when out of the home.  Tends to be aggravated with anxiety.  Head danitza is most apparent when she has the tremor but will simply try to hold her head still and recognize that it is anxiety triggered.     Updated on flu shot and mammogram is normal.     11/2017  EEG NTERPRETATION:  This is an abnormal video EEG recording in the awake, and drowsy state(s). Frequent left anterior temporal sharps and intermittent left anterior temporal slowing. The findings increase risk for seizures and suggest underlying area of cortical irritability and structural abnormality. Clinical and radiological correlation is recommended.     11/17/2017 MRI IMPRESSION:     1.  No evidence of acute territorial infarct, intracranial hemorrhage or mass lesion.  2.  Rare scattered nonspecific periventricular foci of T2 and FLAIR signal hyperintensities consistent with microangiopathic ischemic change versus demyelination or gliosis.    12/2021 47 aEEG was normal with no relevant clinic events.    INTERVAL HISTORY:    Doing well. Seizures are less intense and less frequent. 3 nocturnal " "seizures in 6-8 months. Was having them as much as 2-3 per week prior to clobazam.    Takes vitamin D sleep.    Current AED:  Onfi:  Lamictal 24  mg BID  PRN ativan    Used to have     Past AEDs:    Sleep issues:    Smoking: continues    Headaches: posterior headaches.     Settings Prior to Visit New settings made this encoutner COMMMENTS   NORMAL      Output current      Signal frequency      Pulse width      Signal ON Time      Signal OFF Time      Duty Cycle            AUTOSTIMULATOR      Output current      Pulse width      On Time      Heart Beat detection      Threshold stimulation (% HR increase)            MAGNET      Output current      Pulse width      On Time            Patient's PMH, PSH, FH, and SH were reviewed.    ROS: All review of systems complete and are negative except as documented    CURRENT MEDICATIONS AT THE TIME OF THIS ENCOUNTER:    Current Outpatient Medications:   •  LamoTRIgine, 1 tab(s), Taking  •  dicyclomine, 2 cap(s), Taking  •  LORazepam, 1 tab(s), Taking  •  HYDROcodone-acetaminophen, 1 tab(s), PRN  •  omeprazole, 1 cap(s), Taking  •  cloBAZam, Take 10 mg in the AM and 30 mg in PM. 90 day supply  •  Acetaminophen, Tylenol, PRN  •  eletriptan, TAKE 1/2 TO 1 TABLET BY MOUTH AT ONSET OF HEADACHE. MAY REPEAT DOSE IN 2 HOURS IF UNRELIEVED. DO NOT EXCEED MORE THAN 2 TABLETS IN 24 HOURS, Taking  •  lamoTRIgine, 25 mg, Oral, BID, PRN  •  LamoTRIgine, TAKE 1 TABLET BY MOUTH TWICE DAILY, Taking     EXAM:   Encounter Vitals  /74   Pulse 75   Temp 36.9 °C (98.4 °F)   Ht 1.753 m (5' 9\")   Wt 83.5 kg (184 lb)   SpO2 98%            Physical Exam:  Physical Exam  Constitutional:       Appearance: She is well-developed.   HENT:      Head: Normocephalic and atraumatic.      Nose: Nose normal.   Eyes:      Pupils: Pupils are equal, round, and reactive to light.   Cardiovascular:      Rate and Rhythm: Normal rate and regular rhythm.   Pulmonary:      Effort: Pulmonary effort is normal. "   Musculoskeletal:         General: Normal range of motion.      Cervical back: Normal range of motion.   Skin:     General: Skin is warm.      Coloration: Skin is pale.   Neurological:      Mental Status: She is alert and oriented to person, place, and time.      Motor: No abnormal muscle tone.      Gait: Gait normal.      Comments: No observable changes in neurologic status.  See initial new patient examination for details.    Psychiatric:         Mood and Affect: Mood is depressed.        Neurological Exam   Neurological Exam  Mental Status  Alert. Oriented to person, place, and time. Language is fluent with no aphasia.    Cranial Nerves  CN III, IV, VI: Pupils equal round and reactive to light bilaterally.    Gait   Normal gait.  No observable changes in neurologic status.  See initial new patient examination for details. .       ALL DATA (I.e. labs, procedures, imaging, reports, clinical notes, etc.) FROM [facility] AND/OR OUTSIDE SOURCES, IF AVAILABLE, PERSONALLY REVIEWED:       ASSESSMENT, EDUCATION, AND COUNSELING:  This is a 56 y.o. female patient who presents to the neurology clinic. We had an extensive discussion about the patient's symptoms, signs, and work-up to date, if any. We discussed potential and/or definitive diagnoses, work-up, and potential treatments.       PLAN:  If applicable, the work-up such as labs, imaging, procedures, and/or other testing, referrals, and/or recommended treatment strategies are listed below.    Visit Diagnoses     ICD-10-CM   1. Intractable seizure disorder (HCC)  G40.919   2. Sleep disorder  G47.9   3. At risk for osteopenia  Z91.89   4. Sleep disturbance  G47.9   5. Status post placement of VNS (vagus nerve stimulation) device  Z96.89   6. Cervical spondylosis  M47.812   7. Anxiety  F41.9   8. Moderate episode of recurrent major depressive disorder (HCC)  F33.1   9. Tremor  R25.1        Orders Placed This Encounter   • CBC WITH DIFFERENTIAL   • Comp Metabolic Panel   •  LAMOTRIGINE   • LamoTRIgine 300 MG TABLET SR 24 HR   • dicyclomine (BENTYL) 20 MG Tab   • LORazepam (ATIVAN) 0.5 MG Tab   • HYDROcodone-acetaminophen (NORCO) 7.5-325 MG tab   • omeprazole (PRILOSEC) 40 MG delayed-release capsule   • cloBAZam 20 MG Tab       Settings Prior to Visit New settings made this encoutner COMMMENTS   NORMAL      Output current 1.75 0    Signal frequency      Pulse width      Signal ON Time      Signal OFF Time      Duty Cycle            AUTOSTIMULATOR      Output current 0 0    Pulse width      On Time      Heart Beat detection      Threshold stimulation (% HR increase)            MAGNET 2.25 0    Output current      Pulse width      On Time            Patient with intractable epilepsy, nocturnal convulsions. Better controlled now with addition of onfi. Increasing night time dose from 10/20 to 10/30, VNS turned off as  It is causing patient significant discomfort throughout the day. She has pending MRI and will schedule this. Updated labs have been ordered as well including lamictal level. Discussed her depression. She decline services for now. Also has mild tremor of R>L hands, and head/neck, which has been helped with Onfi.      [employer] ADDRESSED, IF APPLICABLE:  Cuonseled patient again on getting updated mammogram    BILLING DOCUMENTATION:     I spent a total of I spent a total of 70 minutes on the day of the visit.    (5 of which were VNS interrogation and reprogramming) minutes of face-to-face time in this visit. Over 50% of the time of the visit today was spent on counseling and/or coordination of care wtih the patient and/or family, as above in assessment in plan.    Douglas Charles MD  Epilepsy and General Neurology  Department of Neurology  Clinical  of Neurology [facility].

## 2022-06-16 NOTE — PROGRESS NOTES
Telephone    Spoke with patient over the phone. She is having increase in seizure frequencies since turning off her VNS. She has had about 6 nocturnal convulsions since I last saw her in April 2022. I will add her to my schedule at 1 PM tomorrow 06/17/22 so I can turn VNS back on.    Douglas Charles M.D.  Neurology

## 2022-06-17 ENCOUNTER — OFFICE VISIT (OUTPATIENT)
Dept: NEUROLOGY | Facility: MEDICAL CENTER | Age: 57
End: 2022-06-17
Attending: STUDENT IN AN ORGANIZED HEALTH CARE EDUCATION/TRAINING PROGRAM

## 2022-06-17 VITALS
WEIGHT: 182.1 LBS | HEART RATE: 58 BPM | BODY MASS INDEX: 26.97 KG/M2 | SYSTOLIC BLOOD PRESSURE: 118 MMHG | DIASTOLIC BLOOD PRESSURE: 74 MMHG | HEIGHT: 69 IN | OXYGEN SATURATION: 97 %

## 2022-06-17 DIAGNOSIS — F33.1 MODERATE EPISODE OF RECURRENT MAJOR DEPRESSIVE DISORDER (HCC): ICD-10-CM

## 2022-06-17 DIAGNOSIS — Z96.89 STATUS POST PLACEMENT OF VNS (VAGUS NERVE STIMULATION) DEVICE: ICD-10-CM

## 2022-06-17 DIAGNOSIS — G47.9 SLEEP DISTURBANCE: ICD-10-CM

## 2022-06-17 DIAGNOSIS — F41.9 ANXIETY: ICD-10-CM

## 2022-06-17 DIAGNOSIS — Z91.89 AT RISK FOR OSTEOPENIA: ICD-10-CM

## 2022-06-17 DIAGNOSIS — G47.9 SLEEP DISORDER: ICD-10-CM

## 2022-06-17 DIAGNOSIS — G40.919 INTRACTABLE SEIZURE DISORDER (HCC): ICD-10-CM

## 2022-06-17 DIAGNOSIS — M47.812 CERVICAL SPONDYLOSIS: ICD-10-CM

## 2022-06-17 DIAGNOSIS — R25.1 TREMOR: ICD-10-CM

## 2022-06-17 DIAGNOSIS — G43.819 OTHER MIGRAINE WITHOUT STATUS MIGRAINOSUS, INTRACTABLE: ICD-10-CM

## 2022-06-17 PROCEDURE — 99215 OFFICE O/P EST HI 40 MIN: CPT | Mod: 25 | Performed by: STUDENT IN AN ORGANIZED HEALTH CARE EDUCATION/TRAINING PROGRAM

## 2022-06-17 PROCEDURE — 95977 ALYS CPLX CN NPGT PRGRMG: CPT | Performed by: STUDENT IN AN ORGANIZED HEALTH CARE EDUCATION/TRAINING PROGRAM

## 2022-06-17 RX ORDER — LORAZEPAM 0.5 MG/1
TABLET ORAL
Qty: 60 TABLET | Refills: 0 | Status: SHIPPED | OUTPATIENT
Start: 2022-06-17 | End: 2023-05-18 | Stop reason: SDUPTHER

## 2022-06-17 NOTE — PROGRESS NOTES
"NEUROLOGY FOLLOW-UP - 06/17/2022   REFERRING PROVIDER  No referring provider defined for this encounter.    PCP  Ronald Mims   [phone]   [facility] [[id number]]     REASON FOR VISIT: Sarita Loera 56 y.o. female presents today for follow-up. This is an urgent add-on visit as patient has been experiencing an increase in seizure frequency since her VNS was turned off a couple of months OK due to throat discomfort.    SUMMARY OF PROBLEMS AND/OR DIAGNOSES AS PER MY PRIOR NOTES/ENCOUNTERS:  Seizures started 2003/2004. Likely left frontotemporal onset seizures with/without tonic clonic seizures at night       Semiology:  Most common, lip smacking, right hand circling motion, or right rhythmic hand shaking-- lasting up to 60 seconds. Sometimes she will yell out \"Oh my god\" or \"Oh no\" which awakens her . Vast majority of seizures are nocturnal convulsions (focal to b/l tonic clonic)      #Tremor:  Most noteworthy when out of the home.  Tends to be aggravated with anxiety.  Head danitza is most apparent when she has the tremor but will simply try to hold her head still and recognize that it is anxiety triggered.     Updated on flu shot and mammogram is normal.      11/2017  EEG NTERPRETATION:  This is an abnormal video EEG recording in the awake, and drowsy state(s). Frequent left anterior temporal sharps and intermittent left anterior temporal slowing. The findings increase risk for seizures and suggest underlying area of cortical irritability and structural abnormality. Clinical and radiological correlation is recommended.     11/17/2017 MRI IMPRESSION:     1.  No evidence of acute territorial infarct, intracranial hemorrhage or mass lesion.  2.  Rare scattered nonspecific periventricular foci of T2 and FLAIR signal hyperintensities consistent with microangiopathic ischemic change versus demyelination or gliosis.     12/2021 47 aEEG was normal with no relevant clinic events.    INTERVAL " "HISTORY:      As per my recent telephone note dated yesterday, I am seeing patient as an urgent add on to my clinic because of increased seizures. Per my telephone note:    \"Spoke with patient over the phone. She is having increase in seizure frequencies since turning off her VNS. She has had about 6 nocturnal convulsions since I last saw her in April 2022. I will add her to my schedule at 1 PM tomorrow 06/17/22 so I can turn VNS back on.\"    Current AEDs:  Onfi 10 AM/30 PM  Lamictal 24 hour 300 mg BID  0.5 mg prn for breakthrough seizures     VNS Settings prior to 4/20/22 visit New settings made on the 4/20/22 visit Setting changed on today's visit   NORMAL         Output current 1.75 0  0.5 mA   Signal frequency      20 Hz   Pulse width      250 micro seconds   Signal ON Time      30 seconds   Signal OFF Time      10 minutes   Duty Cycle      5%             AUTOSTIMULATOR         Output current 0 0     Pulse width         On Time         Heart Beat detection         Threshold stimulation (% HR increase)                   MAGNET 2.25 0     Output current      0.625 mA   Pulse width      500 microseconds   On Time      60 seconds     Most recent labs reviewed: notably Lamictal therapeutic at 7.3. elevated hemoglobin, hematocrit at 17.2 and 51.6 respectively    Patient's PMH, PSH, FH, and SH were reviewed.    ROS: All review of systems complete and are negative except as documented    CURRENT MEDICATIONS AT THE TIME OF THIS ENCOUNTER:    Current Outpatient Medications:   •  LORazepam, Take one tab once per day as needed for breakthrough seizures.  •  Galcanezumab-gnlm, 120 mg, Subcutaneous, Q30 DAYS  •  LamoTRIgine, 1 tab(s), Taking  •  dicyclomine, 2 cap(s), Taking  •  HYDROcodone-acetaminophen, 1 tab(s), Taking  •  omeprazole, 1 cap(s), Taking  •  cloBAZam, Take 10 mg in the AM and 30 mg in PM. 90 day supply, Taking  •  Acetaminophen, Tylenol, Taking  •  eletriptan, TAKE 1/2 TO 1 TABLET BY MOUTH AT ONSET OF " "HEADACHE. MAY REPEAT DOSE IN 2 HOURS IF UNRELIEVED. DO NOT EXCEED MORE THAN 2 TABLETS IN 24 HOURS, Taking  •  lamoTRIgine, 25 mg, Oral, BID, Taking  •  LamoTRIgine, TAKE 1 TABLET BY MOUTH TWICE DAILY, Taking     EXAM:   Encounter Vitals  /74 (BP Location: Left arm, Patient Position: Sitting, BP Cuff Size: Adult)   Pulse (!) 58   Ht 1.753 m (5' 9\")   Wt 82.6 kg (182 lb 1.6 oz)   SpO2 97%            Physical Exam:  Physical Exam  Constitutional:       Appearance: She is well-developed.   HENT:      Head: Normocephalic and atraumatic.      Nose: Nose normal.   Eyes:      Pupils: Pupils are equal, round, and reactive to light.   Cardiovascular:      Rate and Rhythm: Normal rate and regular rhythm.   Pulmonary:      Effort: Pulmonary effort is normal.   Musculoskeletal:         General: Normal range of motion.      Cervical back: Normal range of motion.   Skin:     General: Skin is warm.   Neurological:      Mental Status: She is alert and oriented to person, place, and time.      Motor: No abnormal muscle tone.      Gait: Gait normal.      Deep Tendon Reflexes: Strength normal.      Comments: No observable changes in neurologic status.  See initial new patient examination for details.    Psychiatric:         Mood and Affect: Mood is depressed.        Neurological Exam   Neurological Exam  Mental Status  Alert. Oriented to person, place, and time. Language is fluent with no aphasia.    Cranial Nerves  CN III, IV, VI: Pupils equal round and reactive to light bilaterally.    Motor   Strength is 5/5 throughout all four extremities.    Gait   Normal gait.Casual gait is normal including stance, stride, and arm swing.  No observable changes in neurologic status.  See initial new patient examination for details. .       ALL DATA (I.e. labs, procedures, imaging, reports, clinical notes, etc.) FROM [facility] AND/OR OUTSIDE SOURCES, IF AVAILABLE, PERSONALLY REVIEWED:       ASSESSMENT, EDUCATION, AND COUNSELING:  This is a " 56 y.o. female patient who presents to the neurology clinic. We had an extensive discussion about the patient's symptoms, signs, and work-up to date, if any. We discussed potential and/or definitive diagnoses, work-up, and potential treatments.       PLAN:  If applicable, the work-up such as labs, imaging, procedures, and/or other testing, referrals, and/or recommended treatment strategies are listed below.    Visit Diagnoses     ICD-10-CM   1. Intractable seizure disorder (HCC)  G40.919   2. Sleep disorder  G47.9   3. At risk for osteopenia  Z91.89   4. Sleep disturbance  G47.9   5. Status post placement of VNS (vagus nerve stimulation) device  Z96.89   6. Cervical spondylosis  M47.812   7. Anxiety  F41.9   8. Moderate episode of recurrent major depressive disorder (HCC)  F33.1   9. Tremor  R25.1   10. Other migraine without status migrainosus, intractable  G43.819        Orders Placed This Encounter   • LORazepam (ATIVAN) 0.5 MG Tab   • Galcanezumab-gnlm 120 MG/ML Solution Prefilled Syringe          Patient with refractory epilepsy who has had an increased frequency of nocturnal seizures the past 2 months.  Suspect that turning off the VNS 2 months ago may have unmasked epileptic activity/seizures.  It was initially turned off per patient's request because she was having significant neck/throat/shoulder pain.  Today I turned back on her VNS and to ensure tolerability I incrementally increased that over period of 30 minutes to the above parameters, which are lower than the parameters she had been on prior to me turning off the VNS a couple months ago.  She is tolerating these current settings well.  We will plan to reconnect in clinic in about 2 months at which point we will consider very slowly increasing the VNS settings more.  Also recommended that she change the way that she is taking her Onfi.  Currently she takes 10 mg in the morning and 30 mg at night.  I recommend that she take all 40 mg at night given that  she has only nocturnal seizures and to mitigate sedative side effects with the a.m. dose.  She will continue taking Lamictal extended release 300 mg twice a day for now.  Finally, I refilled as needed Ativan to use as needed for any breakthrough seizures.  Patient also has chronic migraines.  Started Emgality in the office today which she tolerated well.  Prescription sent to her pharmacy has continuation of therapy.          BILLING DOCUMENTATION:     I spent a total of I spent a total of 60 minutes on the day of the visit.    minutes of face-to-face time in this visit. Over 50% of the time of the visit today was spent on counseling and/or coordination of care wtih the patient and/or family, as above in assessment in plan.    Douglas Charles MD  Epilepsy and General Neurology  Department of Neurology  Clinical  of Neurology [facility].

## 2022-06-17 NOTE — PATIENT INSTRUCTIONS
Neurology Clinic Visit     IMPORTANT: If imaging, procedure(s), AND/or referrals were placed for you:  Contact [facility] Scheduling if you have not heard from them in 5 day: [phone]    Outpatient [facility] Imaging Sites.  [address]  Mon-Fri 8am-5pm   [address] (Breast Center) Mon-Fri 7am-4pm    [address]  Mon-Fri 8am-5pm  [facility] Radiology 7am-6:30pm  [address] [facility] Mon-Fri 8am-7pm  Sat 9am-6pm   [address]  Mon-Fri 8am-7pm and Sat/Sun 9am-5pm  [address]  Mon-Fri 8am-5pm  [address] (PET/CT)  Mon-Fri 8:30am-4:30pm     If labs were ordered for you:  To Schedule an appointment for lab services, please call [phone] or visit [email or web address].  [facility] Lab Services Convenient Locations:    [place]: [address] (Ground Floor)  [address] [address] [facility] (Admitting Entrance)  [address]  [address]  1075 [facility], [address]  975 [facility]  [address]: [address]  [address]       [place]: [address] [place] [address] Dr  [address]     [facility]     [address] [place] [address]     Laboratory Hours: 6:30am - 6pm  Monday - Friday,   7am - 2pm Saturday    [facility]      [address] [facility] [place] [address]      Laboratory Hours:  7:30am - 4pm  Monday - Friday,  9am - 3pm Saturday    [facility]     [address]    [place] [address]      Laboratory Hours:  7:00am - 5:30pm  Monday - Friday    [facility]      [address] [facility] [place] [address]       Laboratory Hours:  7:30am - 4:30pm  Monday - Friday    [facility]       [address] [place] [address]       Laboratory Hours:  8am - 5pm  Monday - Friday    [facility]       [address] [place] [address]       Laboratory hours:  7:30am - 4pm   Monday - Friday    GENERAL REMINDERS:  Request refills 1 week in advance to ensure you do not run out of medications  All diagnostic study results will be reviewed at your next visit, UNLESS there are urgent results that need to be acted on sooner.  Please remember that it is the your responsibility to check with your Insurance for benefit coverage for visit / visits.  24 hours notice is required for all appointment changes or cancellation.  Please arrive 20 min. before your appointment time  Please note that because Dr. Charles has high daily clinic volume, he is unable to accommodate late arrivals. If you are more than 15 minutes late for the scheduled appointment you will be asked to reschedule  Please bring the following with you:  1) Picture ID  2) Insurance card  3) An updated list of ALL your medications and their dosages (prescribed medications, over the counter medications, and all supplements), as well as allergies with you at all times  4) A list of questions, concerns, comments that you wish to discuss with Dr. Melvin Charles MD  General Neurologist and Epileptologist  Department of Neurology  Instructor of Clinical Neurology [facility].

## 2022-08-16 ENCOUNTER — OFFICE VISIT (OUTPATIENT)
Dept: NEUROLOGY | Facility: MEDICAL CENTER | Age: 57
End: 2022-08-16
Attending: STUDENT IN AN ORGANIZED HEALTH CARE EDUCATION/TRAINING PROGRAM

## 2022-08-16 VITALS
HEART RATE: 84 BPM | TEMPERATURE: 96.9 F | OXYGEN SATURATION: 93 % | HEIGHT: 69 IN | BODY MASS INDEX: 25.53 KG/M2 | SYSTOLIC BLOOD PRESSURE: 124 MMHG | WEIGHT: 172.4 LBS | DIASTOLIC BLOOD PRESSURE: 78 MMHG

## 2022-08-16 DIAGNOSIS — G43.819 OTHER MIGRAINE WITHOUT STATUS MIGRAINOSUS, INTRACTABLE: ICD-10-CM

## 2022-08-16 DIAGNOSIS — Z96.89 STATUS POST PLACEMENT OF VNS (VAGUS NERVE STIMULATION) DEVICE: ICD-10-CM

## 2022-08-16 DIAGNOSIS — Z91.89 AT RISK FOR OSTEOPENIA: ICD-10-CM

## 2022-08-16 DIAGNOSIS — G47.9 SLEEP DISORDER: ICD-10-CM

## 2022-08-16 DIAGNOSIS — G40.919 INTRACTABLE SEIZURE DISORDER (HCC): ICD-10-CM

## 2022-08-16 DIAGNOSIS — M47.812 CERVICAL SPONDYLOSIS: ICD-10-CM

## 2022-08-16 DIAGNOSIS — G40.909 SEIZURE DISORDER (HCC): ICD-10-CM

## 2022-08-16 PROCEDURE — 99212 OFFICE O/P EST SF 10 MIN: CPT | Performed by: STUDENT IN AN ORGANIZED HEALTH CARE EDUCATION/TRAINING PROGRAM

## 2022-08-16 PROCEDURE — 95977 ALYS CPLX CN NPGT PRGRMG: CPT | Performed by: STUDENT IN AN ORGANIZED HEALTH CARE EDUCATION/TRAINING PROGRAM

## 2022-08-16 PROCEDURE — 99214 OFFICE O/P EST MOD 30 MIN: CPT | Performed by: STUDENT IN AN ORGANIZED HEALTH CARE EDUCATION/TRAINING PROGRAM

## 2022-08-16 RX ORDER — LAMOTRIGINE 300 MG/1
TABLET, EXTENDED RELEASE ORAL
Qty: 180 TABLET | Refills: 0 | Status: SHIPPED | OUTPATIENT
Start: 2022-08-16 | End: 2022-10-13 | Stop reason: SDUPTHER

## 2022-08-16 RX ORDER — CLOBAZAM 20 MG/1
40 TABLET ORAL NIGHTLY
Qty: 60 TABLET | Refills: 2 | Status: SHIPPED | OUTPATIENT
Start: 2022-08-16 | End: 2022-11-14

## 2022-08-16 RX ORDER — CLOBAZAM 20 MG/1
40 TABLET ORAL NIGHTLY
Qty: 60 TABLET | Refills: 2 | Status: SHIPPED | OUTPATIENT
Start: 2022-08-16 | End: 2022-08-16

## 2022-08-16 RX ORDER — CLOBAZAM 20 MG/1
TABLET ORAL
COMMUNITY
End: 2022-08-16 | Stop reason: SDUPTHER

## 2022-08-16 NOTE — PROGRESS NOTES
"NEUROLOGY FOLLOW-UP - 08/16/2022   REFERRING PROVIDER  No referring provider defined for this encounter.    PCP  Ronald Mims   [phone]   [facility] [[id number]]     REASON FOR VISIT: Sarita Loera 56 y.o. female presents today for follow-up.     SUMMARY OF PROBLEMS AND/OR DIAGNOSES AS PER MY PRIOR NOTES/ENCOUNTERS:  Seizures started 2003/2004. Likely left frontotemporal onset seizures with/without tonic clonic seizures at night        Semiology:  Most common, lip smacking, right hand circling motion, or right rhythmic hand shaking-- lasting up to 60 seconds. Sometimes she will yell out \"Oh my god\" or \"Oh no\" which awakens her . Vast majority of seizures are nocturnal convulsions (focal to b/l tonic clonic)      #Tremor:  Most noteworthy when out of the home.  Tends to be aggravated with anxiety.  Head danitza is most apparent when she has the tremor but will simply try to hold her head still and recognize that it is anxiety triggered.     Updated on flu shot and mammogram is normal.      11/2017  EEG NTERPRETATION:  This is an abnormal video EEG recording in the awake, and drowsy state(s). Frequent left anterior temporal sharps and intermittent left anterior temporal slowing. The findings increase risk for seizures and suggest underlying area of cortical irritability and structural abnormality. Clinical and radiological correlation is recommended.     11/17/2017 MRI IMPRESSION:     1.  No evidence of acute territorial infarct, intracranial hemorrhage or mass lesion.  2.  Rare scattered nonspecific periventricular foci of T2 and FLAIR signal hyperintensities consistent with microangiopathic ischemic change versus demyelination or gliosis.     12/2021 47 aEEG was normal with no relevant clinic events.  INTERVAL HISTORY:  Doing better. Having 2-4 smaller seizures per month, compared to 2-4 seizures per week  Patient's PMH, PSH, FH, and SH were reviewed.    Migraine frequency unchanged " "although she just recently started Emgality.    Current AEDs:  Onfi 10 AM/30 PM  Lamictal 24 hour 300 mg BID  0.5 mg Ativan prn for breakthrough seizures       Changed settings in red   NORMAL     Output current  0.5 mA-> 0.625   Signal frequency  20 Hz   Pulse width  250 micro seconds   Signal ON Time  30 seconds   Signal OFF Time  10 minutes   Duty Cycle  5%         AUTOSTIMULATOR     Output current     Pulse width     On Time     Heart Beat detection     Threshold stimulation (% HR increase)           MAGNET     Output current  0.625 mA -> 0.75 mA   Pulse width  500 microseconds   On Time  60 seconds       ROS: All review of systems complete and are negative except as documented    CURRENT MEDICATIONS AT THE TIME OF THIS ENCOUNTER:    Current Outpatient Medications:     B Complex-C-Biotin-D-Zinc-FA (VITAL-D RX PO), Take  by mouth.    LamoTRIgine, TAKE 1 TABLET BY MOUTH TWICE DAILY    VITAMIN D PO, Take  by mouth. Indications: Patient did not know how much she takes.    cloBAZam, 40 mg, Oral, Nightly    Galcanezumab-gnlm, 120 mg, Subcutaneous, Q30 DAYS, Taking    omeprazole, 1 cap(s), Taking    Acetaminophen, Tylenol, PRN    eletriptan, TAKE 1/2 TO 1 TABLET BY MOUTH AT ONSET OF HEADACHE. MAY REPEAT DOSE IN 2 HOURS IF UNRELIEVED. DO NOT EXCEED MORE THAN 2 TABLETS IN 24 HOURS, PRN    HYDROcodone-acetaminophen, 1 tab(s)     EXAM:   Encounter Vitals  /78 (BP Location: Left arm, Patient Position: Sitting, BP Cuff Size: Adult)   Pulse 84   Temp 36.1 °C (96.9 °F) (Temporal)   Ht 1.753 m (5' 9\")   Wt 78.2 kg (172 lb 6.4 oz)   SpO2 93%            Physical Exam:  Physical Exam  Constitutional:       Appearance: She is well-developed.   HENT:      Head: Normocephalic and atraumatic.      Nose: Nose normal.   Eyes:      Pupils: Pupils are equal, round, and reactive to light.   Cardiovascular:      Rate and Rhythm: Normal rate and regular rhythm.   Pulmonary:      Effort: Pulmonary effort is normal. "   Musculoskeletal:         General: Normal range of motion.      Cervical back: Normal range of motion.   Skin:     General: Skin is warm.   Neurological:      Mental Status: She is alert and oriented to person, place, and time.      Motor: Motor strength is normal. No abnormal muscle tone.      Gait: Gait normal.      Comments: No observable changes in neurologic status.  See initial new patient examination for details.    Psychiatric:         Mood and Affect: Mood is depressed.      Neurological Exam   Neurological Exam  Mental Status  Alert. Oriented to person, place, and time. Language is fluent with no aphasia.    Cranial Nerves  CN III, IV, VI: Pupils equal round and reactive to light bilaterally.    Motor   Strength is 5/5 throughout all four extremities.    Gait   Normal gait.Casual gait is normal including stance, stride, and arm swing.  No observable changes in neurologic status.  See initial new patient examination for details. .     ALL DATA (I.e. labs, procedures, imaging, reports, clinical notes, etc.) FROM [facility] AND/OR OUTSIDE SOURCES, IF AVAILABLE, PERSONALLY REVIEWED:       ASSESSMENT, EDUCATION, AND COUNSELING:  This is a 56 y.o. female patient who presents to the neurology clinic. We had an extensive discussion about the patient's symptoms, signs, and work-up to date, if any. We discussed potential and/or definitive diagnoses, work-up, and potential treatments.       PLAN:  If applicable, the work-up such as labs, imaging, procedures, and/or other testing, referrals, and/or recommended treatment strategies are listed below.    Visit Diagnoses     ICD-10-CM   1. Intractable seizure disorder (HCC)  G40.919   2. Sleep disorder  G47.9   3. At risk for osteopenia  Z91.89   4. Cervical spondylosis  M47.812   5. Status post placement of VNS (vagus nerve stimulation) device  Z96.89   6. Other migraine without status migrainosus, intractable  G43.819   7. Seizure disorder (HCC)  G40.909        Orders  Placed This Encounter    B Complex-C-Biotin-D-Zinc-FA (VITAL-D RX PO)    DISCONTD: cloBAZam 20 MG Tab    DISCONTD: cloBAZam 20 MG Tab    LamoTRIgine 300 MG TABLET SR 24 HR    VITAMIN D PO    cloBAZam (ONFI) 20 MG Tab      Turning on the VNS has helped decrease patient's seizure frequency and decreased the intensity.  She was having 2-4 seizures per week and now she is currently having 2-4 seizures of less intensity per month.  I recommend that she take Onfi all at night to prevent daytime sleepiness.  Onfi refilled.  Lamotrigine continue unchanged 324-hour extended release form twice a day.  She does not think the lamotrigine is ever helped but she does not want to make any more changes at the moment.  We will consider alternative antiseizure medications in the future.      For her migraines, continue Emgality    BILLING DOCUMENTATION:     I spent a total of I spent a total of 30 minutes on the day of the visit.    minutes of face-to-face time in this visit. 8 minutes were spent interrogating VNS, changing parameters, and monitoring patient afterwards to ensure tolerability of settings. Over 50% of the time of the visit today was spent on counseling and/or coordination of care wtih the patient and/or family, as above in assessment in plan.    Douglas Charles MD  Epilepsy and General Neurology  Department of Neurology  Clinical  of Neurology [facility].

## 2022-08-16 NOTE — PATIENT INSTRUCTIONS
Neurology Clinic Visit     IMPORTANT: If imaging, procedure(s), AND/or referrals were placed for you:  Contact [facility] Scheduling if you have not heard from them in 5 day: [phone]    Outpatient [facility] Imaging Sites.  [address]  Mon-Fri 8am-5pm   [address] (Breast Center) Mon-Fri 7am-4pm    [address]  Mon-Fri 8am-5pm  [facility] Radiology 7am-6:30pm  [address] [facility] Mon-Fri 8am-7pm  Sat 9am-6pm   [address]  Mon-Fri 8am-7pm and Sat/Sun 9am-5pm  [address]  Mon-Fri 8am-5pm  [address] (PET/CT)  Mon-Fri 8:30am-4:30pm     If labs were ordered for you:  To Schedule an appointment for lab services, please call [phone] or visit [email or web address].  [facility] Lab Services Convenient Locations:    [place]: [address] (Ground Floor)  [address] [address] [facility] (Admitting Entrance)  [address]  [address]  1075 [facility], [address]  975 [facility]  [address]: [address]  [address]       [place]: [address] [place] [address] Dr  [address]     [facility]     [address] [place] [address]     Laboratory Hours: 6:30am - 6pm  Monday - Friday,   7am - 2pm Saturday    [facility]      [address] [facility] [place] [address]      Laboratory Hours:  7:30am - 4pm  Monday - Friday,  9am - 3pm Saturday    [facility]     [address]    [place] [address]      Laboratory Hours:  7:00am - 5:30pm  Monday - Friday    [facility]      [address] [place] [address]       Laboratory Hours:  7:30am - 4:30pm  Monday - Friday    [facility]       [address] [place] [address]       Laboratory Hours:  8am - 5pm  Monday - Friday    [facility]       [address] [place] [address]       Laboratory hours:  7:30am - 4pm   Monday - Friday    GENERAL REMINDERS:  Request refills 1 week in advance to ensure you do not run out of medications  All diagnostic study results will be reviewed at your next visit, UNLESS there are urgent results that need to be acted on sooner.  Please remember that it is the your responsibility to check with your Insurance for benefit coverage for visit / visits.  24 hours notice is required for all appointment changes or cancellation.  Please arrive 20 min. before your appointment time  Please note that because Dr. Charles has high daily clinic volume, he is unable to accommodate late arrivals. If you are more than 15 minutes late for the scheduled appointment you will be asked to reschedule  Please bring the following with you:  1) Picture ID  2) Insurance card  3) An updated list of ALL your medications and their dosages (prescribed medications, over the counter medications, and all supplements), as well as allergies with you at all times  4) A list of questions, concerns, comments that you wish to discuss with Dr. Melvin Charles MD  General Neurologist and Epileptologist  Department of Neurology  Instructor of Clinical Neurology [facility].

## 2022-10-13 DIAGNOSIS — G40.909 SEIZURE DISORDER (HCC): ICD-10-CM

## 2022-10-13 RX ORDER — LAMOTRIGINE 300 MG/1
TABLET, EXTENDED RELEASE ORAL
Qty: 180 TABLET | Refills: 3 | Status: SHIPPED | OUTPATIENT
Start: 2022-10-13 | End: 2022-11-29

## 2022-11-07 ENCOUNTER — PATIENT MESSAGE (OUTPATIENT)
Dept: NEUROLOGY | Facility: MEDICAL CENTER | Age: 57
End: 2022-11-07

## 2022-11-07 DIAGNOSIS — G40.909 SEIZURE DISORDER (HCC): ICD-10-CM

## 2022-11-29 RX ORDER — LAMOTRIGINE 200 MG/1
200 TABLET, EXTENDED RELEASE ORAL 3 TIMES DAILY
Qty: 90 TABLET | Refills: 5 | Status: SHIPPED | OUTPATIENT
Start: 2022-11-29 | End: 2023-07-17 | Stop reason: SDUPTHER

## 2022-11-29 RX ORDER — LAMOTRIGINE 200 MG/1
200 TABLET, EXTENDED RELEASE ORAL 3 TIMES DAILY
Qty: 90 TABLET | Refills: 5 | Status: SHIPPED | OUTPATIENT
Start: 2022-11-29 | End: 2022-11-29 | Stop reason: SDUPTHER

## 2022-11-29 NOTE — PROGRESS NOTES
Refill lamictal. Script change to 200 mg ER three times per day instead of the 300 mg ER twice per day per patient request. Sent to [facility] pharmacy at [address] [place] [address], [facility], [address] per patient request.     Douglas Charles M.D.

## 2023-01-18 DIAGNOSIS — G40.909 SEIZURE DISORDER (HCC): ICD-10-CM

## 2023-01-19 RX ORDER — CLOBAZAM 20 MG/1
TABLET ORAL
Qty: 120 TABLET | Refills: 1 | Status: SHIPPED | OUTPATIENT
Start: 2023-01-19 | End: 2023-02-18

## 2023-01-19 NOTE — TELEPHONE ENCOUNTER
Received request via: Pharmacy    Was the patient seen in the last year in this department? No    Does the patient have an active prescription (recently filled or refills available) for medication(s) requested? No    Does the patient have [facility] Plus and need 100 day supply (blood pressure, diabetes and cholesterol meds only)? Patient does not have SCP    Dr Charles patient thank you

## 2023-03-09 NOTE — PROGRESS NOTES
"Subjective:      Sarita Loera is a 51 y.o. female who presents with New Patient for Seizures.      Here with  today.    HPI     History of events since age [age].    Has had seizures always at night, between 1-4am typically.    Typical seizure: Yells \"Oh my God\" or \"Oh no\" which wakes her  up.  Smacks her lips, makes circles in the air as if she is writing.  Will write in the air with her right hand?  Will sometimes fade away back into sleep and then other times with have a convulsion.  Loss of bladder, etc. Left side always hurts more the next day.  She will be foggy and just sit the next day.    In the past 8 months she has had one known GTC seizure.    Sometimes during the day, she may \"zone out\" and then loss focus of things around her and breathe deeply.  Notices up to 1-2 per day,  notices them 1-2 times per week.    Seizure diary:  Sarai 10  July 4  Sarai 24 & 25  Sarai 3  Aug 5th    Triggered by poor sleep and stress.    Started on lamictal in 2004.  Transitioned to LTG XR at least a few years ago.    Memory: memory \"comes and goes\".  Can remember certain incidences.  Poor memory of last week.  Forgetful of names and familiar places.    Education: some college    Childhood history: had night terrors as a child.  No known history of seizures.    Surgical history: c-spine surgery in 2014.  Medical history: GI problems-- polyps found, tubal ligation.     for 30 years, mother of 3 children.   in the past.  Has a 's license, released to drive completely by her last provider.    Has had multiple seizures when not taking name brand Lamictal.    Brain MRI: last done years ago.  EEG: possible left frontal lobe problem    Marijuana smoker-- she's a \"stoner\".   Smokes a joint a day in the evening.  No alcohol usage.  Does not smoke cigarettes.    She doesn't do much.  She is socially isolated.  Tends to the house and really has no hobbies.    Current Outpatient Prescriptions " "  Medication Sig Dispense Refill   • ibuprofen (MOTRIN) 800 MG Tab Take 800 mg by mouth every 8 hours as needed.     • LamoTRIgine (LAMICTAL XR) 300 MG TABLET SR 24 HR Take 1 mg by mouth 2 Times a Day.     • omeprazole (PRILOSEC) 40 MG delayed-release capsule Take 40 mg by mouth 2 times a day.     • naproxen (ALEVE) 220 MG tablet Take 220 mg by mouth as needed. Indications: Mild to Moderate Pain       No current facility-administered medications for this visit.       Review of Systems   Constitutional: Negative.    HENT: Negative for hearing loss, nosebleeds and sore throat.         No recent head injury.   Eyes: Negative for double vision.        No new loss of vision.   Respiratory: Negative for cough.         No recent lung infections.   Cardiovascular: Negative for chest pain.   Gastrointestinal: Positive for heartburn (Cosme's esophagus). Negative for nausea, vomiting, abdominal pain and diarrhea.   Genitourinary: Negative.    Musculoskeletal: Negative.    Skin: Negative.    Neurological: Positive for seizures. Negative for headaches.   Endo/Heme/Allergies:        No history of endocrine dysfunction.  No new problems.   Psychiatric/Behavioral: Positive for memory loss. Negative for depression. The patient is not nervous/anxious.         No recent mood changes.          Objective:     /72 mmHg  Pulse 66  Temp(Src) 36.5 °C (97.7 °F)  Resp 16  Ht 1.753 m (5' 9\")  Wt 82.192 kg (181 lb 3.2 oz)  BMI 26.75 kg/m2  SpO2 99%     Physical Exam   Constitutional: She is oriented to person, place, and time. She appears well-developed and well-nourished. No distress.   HENT:   Head: Normocephalic and atraumatic.   Nose: Nose normal.   Wears glasses.   Eyes: Pupils are equal, round, and reactive to light.   Neck: Normal range of motion.   Cardiovascular: Normal rate and regular rhythm.  Exam reveals no gallop and no friction rub.    No murmur heard.  Pulmonary/Chest: Effort normal and breath sounds normal. No " respiratory distress.   Musculoskeletal: Normal range of motion.   Lymphadenopathy:     She has no cervical adenopathy.   Neurological: She is alert and oriented to person, place, and time. She has normal reflexes. Coordination and gait normal.   Right-handed.    CN II: Fundi normal, visual fields full to confrontation.  CN III, IV, VI: Pupils equal, round, and reactive to light.  Extraocular movements full and intact in horizontal and vertical gaze.  CN V: Normal in motor and sensory modalities.  CN VII: No evidence of facial asymmetry.  CN VIII: Hearing grossly intact.  CN IX, X: Palate elevates symmetrically and in the midline.  CN XI: Normal sternocleidomastoid strength.  CN XII: Tongue is in the midline.    Motor: Normal muscle bulk and tone, with full and symmetric strength.  Sensory: Intact to light touch, pinprick, vibration, proprioception, and graphesthesia.  DTR's: 3+ throughout with flexor plantar responses.  Cerebellar/Coordination: Normal finger to finger, finger-tapping, rapid alternating movements, and foot tapping.  Gait: Normal casual gait.  Walks well on heels and toes, as well as in tandem gait.   Skin: Skin is warm and dry. There is pallor.   Psychiatric: She has a normal mood and affect.               Assessment/Plan:     Seizure disorder:  History of events suggestive of complex partial seizures with secondary generalization since 2004.  Only AED prescribed is lamotrigine and Lamictal.  Continues to have uncontrolled seizures primarily nocturnal in nature.    Neurological examination is significant for 3+ DTR's and significantly impaired short term memory.    Discussed her plan of care at length.  Her seizures are not well controlled and she has only had one drug trial.  Her memory is quite poor which may be impacted by the burden of her seizures.    1) Will continue Lamictal ER 300mg BID.  2) Obtain VEEG to evaluate for subclinical seizures.  3) Brain MRI with and without contrast to evaluate  CNS etiology of seizures.  4) New Rx for ativan 1mg tablet provided.    Discussed adding a new AED-- consider Fycompa, Vimpat, Zonegran, Keppra, etc.  There are many options available to her.    Wishes to first have her VEEG and then proceed with addition of a new AED.    Obtain labs as ordered.    Return for follow-up in 3 months to discuss diagnostic evaluation.      EDUCATION AND COUNSELING:  -Education was provided to the patient and/or family regarding diagnosis and prognosis. The chronic and unpredictable nature of the condition was discussed. There is increased risk for additional events, which may carry potential for significant injuries and death.    -We reviewed the current antiepileptic regimen. Potential side effects of antiepileptics were discussed at length, including but no limited to: hypersensitivity reactions (rash and others, some of which can be fatal), visual field changes (some of which may be irreversible), glaucoma, diplopia, kidney stones, osteopenia/osteoporosis/bone fractures, hyperthermia/anhydrosis, tremors, ataxia, dizziness, fatigue, increased risk for falls, cardiac arrhythmias/syncope, gastrointestinal (hepatitis, pancreatitis, gastritis, ulcers), gingival hypertrophy, drowsiness, sedation, anxiety/nervousness, increased risk for suicide, increased risk for depression, and psychosis. We reviewed drug-drug interactions and their potential effect on seizure control and medication side effects.    -Patient/family educated on SUDEP (Sudden Death in Epilepsy). Counseling was provided on the importance of strict medication and follow up compliance. The patient understands the risks associated with non-adherence with the medical plan as outlined, including but not limited to an increased risk for breakthrough seizures, which may contribute to injuries, disability, status epilepticus, and even death.    -Counseling was also provided on potential effects of alcohol and other drugs, which may  lower seizure threshold and/or affect the metabolism of antiepileptic drugs. I recommend avoidance of alcohol and illegal drugs.  -Recommend proper hydration, regular exercise, proper sleep hygiene (7-8 hrs of overnight sleep, avoid sleep deprivation).    -I have made the patient aware of mandatory reporting required by the law in the State of [place] regarding episodes of seizures, loss of consciousness, and/or alteration of awareness. The patient and family are responsible for reporting events to the DMV, instructions were provided. The patient verbalized understanding and states she minimally is driving. Other seizure precautions were discussed at length, including no diving, no skydiving, no unsupervised swimming, no Jacuzzi or bathing in bathtubs.      Pt agrees with plan.        Inflammation Suggestive Of Cancer Camouflage Histology Text: There was a dense lymphocytic infiltrate which prevented adequate histologic evaluation of adjacent structures (see map). Another Mohs layer is indicated to ensure complete tumor removal.

## 2023-04-05 ENCOUNTER — OFFICE VISIT (OUTPATIENT)
Dept: NEUROLOGY | Facility: MEDICAL CENTER | Age: 58
End: 2023-04-05
Attending: STUDENT IN AN ORGANIZED HEALTH CARE EDUCATION/TRAINING PROGRAM
Payer: MEDICAID

## 2023-04-05 VITALS
SYSTOLIC BLOOD PRESSURE: 116 MMHG | OXYGEN SATURATION: 95 % | HEART RATE: 73 BPM | TEMPERATURE: 96.7 F | HEIGHT: 69 IN | DIASTOLIC BLOOD PRESSURE: 68 MMHG | BODY MASS INDEX: 26.97 KG/M2 | WEIGHT: 182.1 LBS

## 2023-04-05 DIAGNOSIS — G40.919 INTRACTABLE SEIZURE DISORDER (HCC): ICD-10-CM

## 2023-04-05 PROCEDURE — 99212 OFFICE O/P EST SF 10 MIN: CPT | Performed by: STUDENT IN AN ORGANIZED HEALTH CARE EDUCATION/TRAINING PROGRAM

## 2023-04-05 PROCEDURE — 99215 OFFICE O/P EST HI 40 MIN: CPT | Performed by: STUDENT IN AN ORGANIZED HEALTH CARE EDUCATION/TRAINING PROGRAM

## 2023-04-05 RX ORDER — CENOBAMATE 12.5-25MG
1 KIT ORAL ONCE
Qty: 28 EACH | Refills: 0
Start: 2023-04-05 | End: 2023-04-05

## 2023-04-05 RX ORDER — CLOBAZAM 20 MG/1
60 TABLET ORAL EVERY EVENING
Qty: 90 TABLET | Refills: 2 | Status: SHIPPED | OUTPATIENT
Start: 2023-04-05 | End: 2023-09-19 | Stop reason: SDUPTHER

## 2023-04-05 RX ORDER — CLOBAZAM 20 MG/1
2 TABLET ORAL EVERY EVENING
COMMUNITY
Start: 2023-03-23 | End: 2023-04-05 | Stop reason: SDUPTHER

## 2023-04-05 ASSESSMENT — PATIENT HEALTH QUESTIONNAIRE - PHQ9: CLINICAL INTERPRETATION OF PHQ2 SCORE: 0

## 2023-04-05 NOTE — PATIENT INSTRUCTIONS
NEUROLOGY CLINIC VISIT WITH DR. CHARLES       PATIENT EXPECTATIONS AND IMPORTANT APPOINTMENT REMINDERS:   You matter to Dr. Charles and you deserve the best care.   Dr. Charles prides himself on providing the best possible care to all his patients. He strives to make each appointment meaningful, so that all your concerns are being addressed and all your neurological problems are being optimally treated. In order to achieve these goals for everyone, Dr. Charles has listed important reminders and the best ways to prepare for each appointment. Please read each item carefully. Thank you!    REFILLS:   Request refills AT LEAST 1 week in advance to ensure you do not run out of medications    [employer]  It is STRONGLY encouraged that ALL patients sign up for [employer]. It is BY FAR the fastest and most convenient way for both Dr. Charles and patients to obtain timely refills.  If you are having trouble signing up or logging into your account, staff are available to help you. Please ask a medical assistant or staff at the  to assist you.    TEST RESULS:   All labs and diagnostic test results will be reviewed at your next visit, UNLESS  Dr. Charles determines that there are important findings on the tests need to be acted on sooner. Dr. Charles will either call or send a message through [employer] if this is the case.    BE PREPARED PRIOR TO EVERY APPOINTMENT:  All patient are responsible for ensuring that ALL test results that were completed outside of the [facility] system have been received by our Neurology Department PRIOR to your appointment with Dr. Charles.    IMPORTANT:  ALL images (not just the reports) must be sent and uploaded to the [facility] system. Dr. Charles reviews all images personally prior to each visit. Ensuring that ALL the test results and test images are accessible to Dr. Charles prior to your appointment is YOUR responsibility and an important part of making the most out of each appointment.   Bring a  [facility]-issues picture ID and an updated insurance card EVERY visit.  It is highly recommended that you bring at every visit a list of the most important topics that you want address. While it may not be possible to address all items on the list in a single visit, preparing a list will ensure that Dr. Charles addresses the items that are most important to you and your health    PAPERWORK, DOCUMENTATION, LETTER REQUESTS:  You must notify the office ahead of your appointment of all paperwork or letter requests.   Please DO NOT wait until the last minute to make these requests. Please give all paperwork to the medical assistant at the start of the appointment and check-in process. Please note that Dr. Charles may not be able complete some types of documentation in a single appointment or even within a single day or week. This is why it is important to communicate paperwork requests prior to your appointment and at least 2 weeks prior to any deadlines.    KNOW ALL YOU MEDICATIONS:   AT EVERY SINGLE APPOINTMENT, please bring a list of every single prescribed, non-prescribed, and over the counter medication or supplements you are taking, including ones taken on a rare or intermittent basis.  Include the following information for each prescribed or non-prescribed medications:  Name of medication   The strength of EACH pill/capsule/tablet, etc.   The number of pills/capsules/tablets, etc taken per dose  The number and time of day that doses are taken  For every single Supplement that you take on a routine or intermittent basis, you must include:  The Brand Name   A complete list of every single ingredient, compound, vitamin, and/or mineral in each dose, along with the corresponding amounts/strengths of all ingredients, vitamins, minerals, etc., if such information is provided or known  The number of doses taken per day and time of day doses are taken  If medications are taken on an intermittent or as needed basis, please  "estimate how many days per week or days per month the medications are used  DO NOT just print out your medication list from [employer] or bring a list from a prior appointment or hospitalizations because the information is often often unreliable, inaccurate, outdated, and/or incomplete   The list should be printed or written  If you forget or do not have a list of all the medication, then it is acceptable, although less preferred, to bring all the bottles to the appointment     ARRIVE EARLY FOR ALL VISITS:  Please note that we are unable to accommodate late arrivals as per office policy.  YOU-the patient - (NOT a parent, spouse, or friend) must be physically present at check-in no later than 12 minutes after the scheduled appointment time, or you will be asked to reschedule   Consider scheduling a virtual appointment with Dr. Charles through [employer] as an alternative if transportation to the clinic is difficult or unavailable   Please note, however, that virtual visits can only be scheduled after being an established patient of Dr. Charles. All new appointments must be done in-person in clinic  Some insurances will not cover the cost of virtual appointments. Please check with your insurance to find out if these visits are covered    COMMUNICATING URGENT AND NON-URGENT MATTERS:  Your concerns are important and deserve to be heard and addressed. If you have an urgent matter, there are two methods that will ensure your concerns are prioritized appropriately:   Preferred method: Sign-up/Login to your [employer] account and send a message addressed to Dr. Charles or Kellie Galvez (Dr. Charles's assistant). In the subject line, type \"urgent\" followed by a word or phrase describing the situation (For example, write \"Urgent: Out of antiseuzre med and need refill\" or \"Urgent: Severe side effects to new meds\". In doing this, our staff can ensure urgent messages are triaged appropriately and communicated to Dr. Charles that day.  Call " [facility] main line at [phone]. Dr. Charles's voicemail extension is [phone]. When leaving a voice message, specifically indicate if it is urgent (or non-urgent) so that the matter can be triaged appropriately and addressed in a timely manner    Thank you for taking important action in your care!

## 2023-04-05 NOTE — PROGRESS NOTES
"NEUROLOGY FOLLOW-UP - 04/05/2023   REFERRING PROVIDER  No referring provider defined for this encounter.    PCP  Ronald Mims   [phone]   [facility] [[id number]]     REASON FOR VISIT: Sarita Loera 57 y.o. female presents today for follow-up.     SUMMARY OF PROBLEMS AND/OR DIAGNOSES AS PER MY PRIOR NOTES/ENCOUNTERS:  Seizures started 2003/2004. Likely left frontotemporal onset seizures with/without tonic clonic seizures at night        Semiology:  Most common, lip smacking, right hand circling motion, or right rhythmic hand shaking-- lasting up to 60 seconds. Sometimes she will yell out \"Oh my god\" or \"Oh no\" which awakens her . Vast majority of seizures are nocturnal convulsions (focal to b/l tonic clonic)      #Tremor:  Most noteworthy when out of the home.  Tends to be aggravated with anxiety.  Head danitza is most apparent when she has the tremor but will simply try to hold her head still and recognize that it is anxiety triggered.     Updated on flu shot and mammogram is normal.      11/2017  EEG NTERPRETATION:  This is an abnormal video EEG recording in the awake, and drowsy state(s). Frequent left anterior temporal sharps and intermittent left anterior temporal slowing. The findings increase risk for seizures and suggest underlying area of cortical irritability and structural abnormality. Clinical and radiological correlation is recommended.     11/17/2017 MRI IMPRESSION:     1.  No evidence of acute territorial infarct, intracranial hemorrhage or mass lesion.  2.  Rare scattered nonspecific periventricular foci of T2 and FLAIR signal hyperintensities consistent with microangiopathic ischemic change versus demyelination or gliosis.  INTERVAL HISTORY:    Takes Lamotrigine  mg/ 200 mg ER, Takes Onfi 10/10    Patient's PMH, PSH, FH, and SH were reviewed.    ROS: All review of systems complete and are negative except as documented    CURRENT MEDICATIONS AT THE TIME OF THIS " "ENCOUNTER:    Current Outpatient Medications:     cloBAZam, 60 mg, Oral, Q EVENING    Xcopri, 1 Dose Pack, Oral, Once    [START ON 5/3/2023] cenobamate, 50 mg, Oral, QHS    [START ON 5/18/2023] cenobamate, 100 mg, Oral, DAILY    LamoTRIgine, 200 mg, Oral, TID, Taking    B Complex-C-Biotin-D-Zinc-FA (VITAL-D RX PO), Take  by mouth., Taking    VITAMIN D PO, Take  by mouth. Indications: Patient did not know how much she takes., Taking    Acetaminophen, Tylenol, PRN    eletriptan, TAKE 1/2 TO 1 TABLET BY MOUTH AT ONSET OF HEADACHE. MAY REPEAT DOSE IN 2 HOURS IF UNRELIEVED. DO NOT EXCEED MORE THAN 2 TABLETS IN 24 HOURS, PRN    Galcanezumab-gnlm, 120 mg, Subcutaneous, Q30 DAYS     EXAM:   Encounter Vitals  /68 (BP Location: Right arm, Patient Position: Sitting, BP Cuff Size: Adult)   Pulse 73   Temp 35.9 °C (96.7 °F) (Temporal)   Ht 1.753 m (5' 9\")   Wt 82.6 kg (182 lb 1.6 oz)   SpO2 95%            Physical Exam:  Physical Exam  Constitutional:       Appearance: She is well-developed.   HENT:      Head: Normocephalic and atraumatic.      Nose: Nose normal.   Eyes:      Pupils: Pupils are equal, round, and reactive to light.   Cardiovascular:      Rate and Rhythm: Normal rate and regular rhythm.   Pulmonary:      Effort: Pulmonary effort is normal.   Musculoskeletal:         General: Normal range of motion.      Cervical back: Normal range of motion.   Skin:     General: Skin is warm.   Neurological:      Mental Status: She is alert and oriented to person, place, and time.      Motor: Motor strength is normal. No abnormal muscle tone.      Gait: Gait normal.      Comments: No observable changes in neurologic status.  See initial new patient examination for details.    Psychiatric:         Mood and Affect: Mood is depressed.      Neurological Exam   Neurological Exam  Mental Status  Alert. Oriented to person, place, and time. Language is fluent with no aphasia.    Cranial Nerves  CN III, IV, VI: Pupils equal " round and reactive to light bilaterally.    Motor   Strength is 5/5 throughout all four extremities.    Gait   Normal gait.Casual gait is normal including stance, stride, and arm swing.  No observable changes in neurologic status.  See initial new patient examination for details. .     ALL DATA (I.e. labs, procedures, imaging, reports, clinical notes, etc.) FROM [facility] AND/OR OUTSIDE SOURCES, IF AVAILABLE, PERSONALLY REVIEWED:       ASSESSMENT, EDUCATION, AND COUNSELING:  This is a 57 y.o. female patient who presents to the neurology clinic. We had an extensive discussion about the patient's symptoms, signs, and work-up to date, if any. We discussed potential and/or definitive diagnoses, work-up, and potential treatments.       PLAN:  If applicable, the work-up such as labs, imaging, procedures, and/or other testing, referrals, and/or recommended treatment strategies are listed below.    Visit Diagnoses     ICD-10-CM   1. Intractable seizure disorder (HCC)  G40.919        Orders Placed This Encounter    LAMOTRIGINE    VITAMIN D,25 HYDROXY (DEFICIENCY)    CBC WITH DIFFERENTIAL    Comp Metabolic Panel    TSH    DISCONTD: cloBAZam 20 MG Tab    cloBAZam 20 MG Tab    Cenobamate (XCOPRI) 14 x 12.5 MG & 14 x 25 MG Tablet Therapy Pack    cenobamate (XCOPRI) 50 MG Tab tablet    cenobamate (XCOPRI) 100 MG Tab tablet        Patient with ongoing seizures weekly.  She has benefited from Onfi so we will increase it to 60 mg/day, recommend taking it all at night if able to.  She has not benefited from lamotrigine and she takes a total daily dose of 600 mg extended release per day.  We will transition to Xcopri starting at 12.5 mg for 2 weeks and then continuing with the manufactures recommended titration schedule.  I gave patient sample pack to start out with.  They will fill out a patient assistant form and drop it off tomorrow.  We will also consider evaluating her sleep as she may have untreated sleep apnea in the future.  We  need updated blood work as above.  We are checking vitamin D levels as well as thyroid hormone.  Follow-up in approximately 2 months virtual visit.          BILLING DOCUMENTATION:     I spent a total of I spent a total of 45 minutes on the day of the visit.    minutes of face-to-face time in this visit. Over 50% of the time of the visit today was spent on counseling and/or coordination of care wtih the patient and/or family, as above in assessment in plan.    Douglas Charles MD  Epilepsy and General Neurology  Department of Neurology  Clinical  of Neurology [facility] [employer].

## 2023-04-06 ENCOUNTER — TELEPHONE (OUTPATIENT)
Dept: NEUROLOGY | Facility: MEDICAL CENTER | Age: 58
End: 2023-04-06
Payer: MEDICAID

## 2023-04-06 ENCOUNTER — HOSPITAL ENCOUNTER (OUTPATIENT)
Dept: LAB | Facility: MEDICAL CENTER | Age: 58
End: 2023-04-06
Attending: STUDENT IN AN ORGANIZED HEALTH CARE EDUCATION/TRAINING PROGRAM
Payer: MEDICAID

## 2023-04-06 DIAGNOSIS — G40.919 INTRACTABLE SEIZURE DISORDER (HCC): ICD-10-CM

## 2023-04-06 LAB
25(OH)D3 SERPL-MCNC: 47 NG/ML (ref 30–100)
ALBUMIN SERPL BCP-MCNC: 4.8 G/DL (ref 3.2–4.9)
ALBUMIN/GLOB SERPL: 1.5 G/DL
ALP SERPL-CCNC: 109 U/L (ref 30–99)
ALT SERPL-CCNC: 13 U/L (ref 2–50)
ANION GAP SERPL CALC-SCNC: 12 MMOL/L (ref 7–16)
AST SERPL-CCNC: 13 U/L (ref 12–45)
BASOPHILS # BLD AUTO: 0.5 % (ref 0–1.8)
BASOPHILS # BLD: 0.04 K/UL (ref 0–0.12)
BILIRUB SERPL-MCNC: 0.3 MG/DL (ref 0.1–1.5)
BUN SERPL-MCNC: 17 MG/DL (ref 8–22)
CALCIUM ALBUM COR SERPL-MCNC: 9.1 MG/DL (ref 8.5–10.5)
CALCIUM SERPL-MCNC: 9.7 MG/DL (ref 8.5–10.5)
CHLORIDE SERPL-SCNC: 106 MMOL/L (ref 96–112)
CO2 SERPL-SCNC: 23 MMOL/L (ref 20–33)
CREAT SERPL-MCNC: 0.73 MG/DL (ref 0.5–1.4)
EOSINOPHIL # BLD AUTO: 0.1 K/UL (ref 0–0.51)
EOSINOPHIL NFR BLD: 1.2 % (ref 0–6.9)
ERYTHROCYTE [DISTWIDTH] IN BLOOD BY AUTOMATED COUNT: 46.2 FL (ref 35.9–50)
GFR SERPLBLD CREATININE-BSD FMLA CKD-EPI: 95 ML/MIN/1.73 M 2
GLOBULIN SER CALC-MCNC: 3.1 G/DL (ref 1.9–3.5)
GLUCOSE SERPL-MCNC: 92 MG/DL (ref 65–99)
HCT VFR BLD AUTO: 51.2 % (ref 37–47)
HGB BLD-MCNC: 16.7 G/DL (ref 12–16)
IMM GRANULOCYTES # BLD AUTO: 0.03 K/UL (ref 0–0.11)
IMM GRANULOCYTES NFR BLD AUTO: 0.4 % (ref 0–0.9)
LYMPHOCYTES # BLD AUTO: 2.28 K/UL (ref 1–4.8)
LYMPHOCYTES NFR BLD: 27.5 % (ref 22–41)
MCH RBC QN AUTO: 29.5 PG (ref 27–33)
MCHC RBC AUTO-ENTMCNC: 32.6 G/DL (ref 33.6–35)
MCV RBC AUTO: 90.3 FL (ref 81.4–97.8)
MONOCYTES # BLD AUTO: 0.49 K/UL (ref 0–0.85)
MONOCYTES NFR BLD AUTO: 5.9 % (ref 0–13.4)
NEUTROPHILS # BLD AUTO: 5.36 K/UL (ref 2–7.15)
NEUTROPHILS NFR BLD: 64.5 % (ref 44–72)
NRBC # BLD AUTO: 0 K/UL
NRBC BLD-RTO: 0 /100 WBC
PLATELET # BLD AUTO: 240 K/UL (ref 164–446)
PMV BLD AUTO: 9.4 FL (ref 9–12.9)
POTASSIUM SERPL-SCNC: 4.6 MMOL/L (ref 3.6–5.5)
PROT SERPL-MCNC: 7.9 G/DL (ref 6–8.2)
RBC # BLD AUTO: 5.67 M/UL (ref 4.2–5.4)
SODIUM SERPL-SCNC: 141 MMOL/L (ref 135–145)
TSH SERPL DL<=0.005 MIU/L-ACNC: 0.63 UIU/ML (ref 0.38–5.33)
WBC # BLD AUTO: 8.3 K/UL (ref 4.8–10.8)

## 2023-04-06 PROCEDURE — 36415 COLL VENOUS BLD VENIPUNCTURE: CPT

## 2023-04-06 PROCEDURE — 82306 VITAMIN D 25 HYDROXY: CPT

## 2023-04-06 PROCEDURE — 84443 ASSAY THYROID STIM HORMONE: CPT

## 2023-04-06 PROCEDURE — 80053 COMPREHEN METABOLIC PANEL: CPT

## 2023-04-06 PROCEDURE — 85025 COMPLETE CBC W/AUTO DIFF WBC: CPT

## 2023-04-06 PROCEDURE — 80175 DRUG SCREEN QUAN LAMOTRIGINE: CPT

## 2023-04-06 NOTE — TELEPHONE ENCOUNTER
Spoke with [facility] pharmacy in regards to cloBAZam 20 MG Oral Tablet. Verified a 30 day supply as script did not mention that.

## 2023-04-07 LAB — LAMOTRIGINE SERPL-MCNC: 8.6 UG/ML (ref 3–15)

## 2023-04-13 ENCOUNTER — TELEPHONE (OUTPATIENT)
Dept: NEUROLOGY | Facility: MEDICAL CENTER | Age: 58
End: 2023-04-13
Payer: MEDICAID

## 2023-04-13 NOTE — TELEPHONE ENCOUNTER
Spoke with the patient in regards to patient assistance program for XCOPRI and she leet me know a representative called her earlier this morning to get missing signatures.

## 2023-04-19 DIAGNOSIS — G40.919 INTRACTABLE SEIZURE DISORDER (HCC): ICD-10-CM

## 2023-04-26 ENCOUNTER — TELEPHONE (OUTPATIENT)
Dept: NEUROLOGY | Facility: MEDICAL CENTER | Age: 58
End: 2023-04-26
Payer: MEDICAID

## 2023-04-28 NOTE — TELEPHONE ENCOUNTER
This is a dr Charles pt.    [phone]  Pt called back, states she would like to know test results done earlier this month ordered by dr Charles.    She stated she sees many red flags and a little concern and would like to know if should be something she should be?  Please advise

## 2023-05-18 ENCOUNTER — PATIENT MESSAGE (OUTPATIENT)
Dept: NEUROLOGY | Facility: MEDICAL CENTER | Age: 58
End: 2023-05-18
Payer: MEDICAID

## 2023-05-18 DIAGNOSIS — G43.809 OTHER MIGRAINE WITHOUT STATUS MIGRAINOSUS, NOT INTRACTABLE: ICD-10-CM

## 2023-05-18 DIAGNOSIS — G40.919 INTRACTABLE SEIZURE DISORDER (HCC): ICD-10-CM

## 2023-05-18 RX ORDER — ELETRIPTAN HYDROBROMIDE 40 MG/1
TABLET, FILM COATED ORAL
Qty: 27 TABLET | Refills: 0 | Status: SHIPPED | OUTPATIENT
Start: 2023-05-18 | End: 2023-09-28

## 2023-05-18 RX ORDER — LORAZEPAM 0.5 MG/1
TABLET ORAL
Qty: 60 TABLET | Refills: 0 | Status: SHIPPED | OUTPATIENT
Start: 2023-05-18 | End: 2023-12-22 | Stop reason: SDUPTHER

## 2023-05-19 ENCOUNTER — TELEPHONE (OUTPATIENT)
Dept: NEUROLOGY | Facility: MEDICAL CENTER | Age: 58
End: 2023-05-19
Payer: MEDICAID

## 2023-05-19 NOTE — TELEPHONE ENCOUNTER
VOICEMAIL  1. Caller Name: Sendy                      Call Back Number: [phone]    2. Message: Sendy with [facility] called needing a days supply for her Lorazepam prescription.     3. Patient approves office to leave a detailed voicemail/MyChart message: N\A    I CALLED AND SPOKE TO PHARMACIST AND LET THEM KNOW IT WAS A 60 DAY PRESCRIPTION.

## 2023-06-23 ENCOUNTER — TELEMEDICINE (OUTPATIENT)
Dept: NEUROLOGY | Facility: MEDICAL CENTER | Age: 58
End: 2023-06-23
Attending: STUDENT IN AN ORGANIZED HEALTH CARE EDUCATION/TRAINING PROGRAM
Payer: MEDICAID

## 2023-06-23 VITALS — HEIGHT: 69 IN | WEIGHT: 165 LBS | BODY MASS INDEX: 24.44 KG/M2

## 2023-06-23 DIAGNOSIS — M54.2 CERVICALGIA: ICD-10-CM

## 2023-06-23 DIAGNOSIS — G40.909 SEIZURE DISORDER (HCC): ICD-10-CM

## 2023-06-23 DIAGNOSIS — G47.9 SLEEP DISORDER: ICD-10-CM

## 2023-06-23 DIAGNOSIS — G40.919 INTRACTABLE SEIZURE DISORDER (HCC): ICD-10-CM

## 2023-06-23 DIAGNOSIS — Z96.89 STATUS POST PLACEMENT OF VNS (VAGUS NERVE STIMULATION) DEVICE: ICD-10-CM

## 2023-06-23 DIAGNOSIS — G43.809 OTHER MIGRAINE WITHOUT STATUS MIGRAINOSUS, NOT INTRACTABLE: ICD-10-CM

## 2023-06-23 DIAGNOSIS — M48.02 SPINAL STENOSIS IN CERVICAL REGION: ICD-10-CM

## 2023-06-23 DIAGNOSIS — M47.812 CERVICAL SPONDYLOSIS: ICD-10-CM

## 2023-06-23 DIAGNOSIS — F41.9 ANXIETY: ICD-10-CM

## 2023-06-23 DIAGNOSIS — F17.200 SMOKER: ICD-10-CM

## 2023-06-23 DIAGNOSIS — Z91.89 AT RISK FOR OSTEOPENIA: ICD-10-CM

## 2023-06-23 DIAGNOSIS — J06.9 UPPER RESPIRATORY TRACT INFECTION, UNSPECIFIED TYPE: ICD-10-CM

## 2023-06-23 DIAGNOSIS — R25.1 TREMOR: ICD-10-CM

## 2023-06-23 PROCEDURE — 99213 OFFICE O/P EST LOW 20 MIN: CPT | Mod: 95 | Performed by: STUDENT IN AN ORGANIZED HEALTH CARE EDUCATION/TRAINING PROGRAM

## 2023-06-23 RX ORDER — DEXAMETHASONE 6 MG/1
TABLET ORAL
COMMUNITY
Start: 2023-06-01 | End: 2023-06-23

## 2023-06-23 ASSESSMENT — FIBROSIS 4 INDEX: FIB4 SCORE: 0.87

## 2023-06-23 NOTE — PROGRESS NOTES
"Telemedicine: Established Patient   This evaluation was conducted via Zoom using secure and encrypted videoconferencing technology. The patient was in their home in the [facility] [place].    The patient's identity was confirmed and verbal consent was obtained for this virtual visit.    NEUROLOGY FOLLOW-UP - 06/23/2023   REFERRING PROVIDER  No referring provider defined for this encounter.    PCP  Ronald Mims   [phone]   [facility] [[id number]]     REASON FOR VISIT: Sarita Loera 57 y.o. female presents today for follow-up.     SUMMARY OF PROBLEMS AND/OR DIAGNOSES AS PER MY PRIOR NOTES/ENCOUNTERS:  Seizures started 2003/2004. Likely left frontotemporal onset seizures with/without tonic clonic seizures at night        Semiology:  Most common, lip smacking, right hand circling motion, or right rhythmic hand shaking-- lasting up to 60 seconds. Sometimes she will yell out \"Oh my god\" or \"Oh no\" which awakens her . Vast majority of seizures are nocturnal convulsions (focal to b/l tonic clonic)      #Tremor:  Most noteworthy when out of the home.  Tends to be aggravated with anxiety.  Head danitza is most apparent when she has the tremor but will simply try to hold her head still and recognize that it is anxiety triggered.     Updated on flu shot and mammogram is normal.      11/2017  EEG NTERPRETATION:  This is an abnormal video EEG recording in the awake, and drowsy state(s). Frequent left anterior temporal sharps and intermittent left anterior temporal slowing. The findings increase risk for seizures and suggest underlying area of cortical irritability and structural abnormality. Clinical and radiological correlation is recommended.     11/17/2017 MRI IMPRESSION:     1.  No evidence of acute territorial infarct, intracranial hemorrhage or mass lesion.  2.  Rare scattered nonspecific periventricular foci of T2 and FLAIR signal hyperintensities consistent with microangiopathic ischemic change " "versus demyelination or gliosis.  INTERVAL HISTORY:      Takes Lamotrigine  mg/ 400 mg ER, Takes Onfi 20/40     Had 3-4 small seizures, maybe one larger seizure since she last saw me. S/o corroborated this information. Overall she is doing better, despite having been fighting off an viral infection, URI. She never started Xcopri. There was some misunderstanding on this medication, issues with insurance and getting ahold of someone at our office.      Not taking emgality. Headaches are managed with prn tylenol    CURRENT MEDICATIONS AT THE TIME OF THIS ENCOUNTER:    Current Outpatient Medications:     eletriptan, TAKE 1/2 TO 1 TABLET BY MOUTH AT ONSET OF HEADACHE. MAY REPEAT DOSE IN 2 HOURS IF UNRELIEVED. DO NOT EXCEED MORE THAN 2 TABLETS IN 24 HOURST, PRN    cloBAZam, 60 mg, Oral, Q EVENING, Taking    B Complex-C-Biotin-D-Zinc-FA (VITAL-D RX PO), Take  by mouth., Taking    VITAMIN D PO, Take  by mouth. Indications: Patient did not know how much she takes., Taking    Acetaminophen, Tylenol, PRN     EXAM:   Encounter Vitals  Ht 1.753 m (5' 9\")   Wt 74.8 kg (165 lb)            Physical Exam:  Physical Exam  Constitutional:       General: She is not in acute distress.     Appearance: She is not ill-appearing.   HENT:      Head: Normocephalic.   Psychiatric:         Speech: Speech normal.        Neurological Exam   Neurological Exam  Mental Status  Awake, alert and oriented to person, place and time. Speech is normal. Language is fluent with no aphasia. Attention and concentration are normal.    Cranial Nerves  CN VII: Full and symmetric facial movement.       ALL DATA (I.e. labs, procedures, imaging, reports, clinical notes, etc.) FROM [facility] AND/OR OUTSIDE SOURCES, IF AVAILABLE, PERSONALLY REVIEWED:       ASSESSMENT, EDUCATION, AND COUNSELING:  This is a 57 y.o. female patient who presents to the neurology clinic. We had an extensive discussion about the patient's symptoms, signs, and work-up to date, if any. We " discussed potential and/or definitive diagnoses, work-up, and potential treatments.       PLAN:  If applicable, the work-up such as labs, imaging, procedures, and/or other testing, referrals, and/or recommended treatment strategies are listed below.    Visit Diagnoses     ICD-10-CM   1. Intractable seizure disorder (HCC)  G40.919   2. Other migraine without status migrainosus, not intractable  G43.809   3. Seizure disorder (HCC)  G40.909   4. At risk for osteopenia  Z91.89   5. Sleep disorder  G47.9   6. Status post placement of VNS (vagus nerve stimulation) device  Z96.89   7. Tremor  R25.1   8. Cervical spondylosis  M47.812   9. Spinal stenosis in cervical region  M48.02   10. Cervicalgia  M54.2   11. Upper respiratory tract infection, unspecified type  J06.9   12. Anxiety  F41.9   13. Smoker  F17.200        Orders Placed This Encounter    DISCONTD: dexamethasone (DECADRON) 6 MG Tab        Patient with intractable epilepsy. Doing better on current regimen of lamictal ER and Onfi combination. Holding on the Xcopri. F/u in 2-3 months in clinic to discuss future management strategies, if necessary.          BILLING DOCUMENTATION:     I spent a total of I spent a total of 20 minutes on the day of the visit.   of face-to-face time in this visit. Over 50% of the time of the visit today was spent on counseling and/or coordination of care wtih the patient and/or family, as above in assessment in plan.    Douglas Charles MD  Department of Neurology at [facility]  Diplomate of the American Board of Psychiatry and Neurology, General Neurology  Diplomate of American Board of Psychiatry and Neurology, a Member Board of the American Board of Medical Subspecialties, Epilepsy  Director of [facility] Level III Comprehensive Epilepsy Program  Professor of Clinical Neurology, [facility].   [address], [address]  [facility] [address] [phone]   Fax: [phone]  E-mail: [email or web address]

## 2023-09-18 DIAGNOSIS — G40.919 INTRACTABLE SEIZURE DISORDER (HCC): ICD-10-CM

## 2023-09-19 RX ORDER — CLOBAZAM 20 MG/1
TABLET ORAL
Qty: 90 TABLET | Refills: 0 | Status: SHIPPED | OUTPATIENT
Start: 2023-09-19 | End: 2023-10-20 | Stop reason: SDUPTHER

## 2023-09-28 ENCOUNTER — TELEPHONE (OUTPATIENT)
Dept: NEUROLOGY | Facility: MEDICAL CENTER | Age: 58
End: 2023-09-28

## 2023-09-28 ENCOUNTER — OFFICE VISIT (OUTPATIENT)
Dept: NEUROLOGY | Facility: MEDICAL CENTER | Age: 58
End: 2023-09-28
Attending: STUDENT IN AN ORGANIZED HEALTH CARE EDUCATION/TRAINING PROGRAM
Payer: MEDICAID

## 2023-09-28 VITALS
WEIGHT: 177.47 LBS | HEART RATE: 70 BPM | TEMPERATURE: 97.1 F | HEIGHT: 69 IN | OXYGEN SATURATION: 94 % | SYSTOLIC BLOOD PRESSURE: 110 MMHG | DIASTOLIC BLOOD PRESSURE: 58 MMHG | BODY MASS INDEX: 26.29 KG/M2

## 2023-09-28 DIAGNOSIS — F17.200 SMOKER: ICD-10-CM

## 2023-09-28 DIAGNOSIS — M54.2 CERVICALGIA: ICD-10-CM

## 2023-09-28 DIAGNOSIS — K22.719 BARRETT'S ESOPHAGUS WITH DYSPLASIA: ICD-10-CM

## 2023-09-28 DIAGNOSIS — G43.809 OTHER MIGRAINE WITHOUT STATUS MIGRAINOSUS, NOT INTRACTABLE: ICD-10-CM

## 2023-09-28 DIAGNOSIS — Z96.89 STATUS POST PLACEMENT OF VNS (VAGUS NERVE STIMULATION) DEVICE: ICD-10-CM

## 2023-09-28 DIAGNOSIS — R56.9 NOCTURNAL SEIZURES (HCC): ICD-10-CM

## 2023-09-28 DIAGNOSIS — F41.9 ANXIETY: ICD-10-CM

## 2023-09-28 DIAGNOSIS — R07.9 CHEST PAIN, UNSPECIFIED TYPE: ICD-10-CM

## 2023-09-28 DIAGNOSIS — G40.919 INTRACTABLE SEIZURE DISORDER (HCC): ICD-10-CM

## 2023-09-28 DIAGNOSIS — M48.02 SPINAL STENOSIS IN CERVICAL REGION: ICD-10-CM

## 2023-09-28 DIAGNOSIS — R00.2 PALPITATIONS: ICD-10-CM

## 2023-09-28 DIAGNOSIS — R25.1 TREMOR: ICD-10-CM

## 2023-09-28 PROCEDURE — 99212 OFFICE O/P EST SF 10 MIN: CPT | Performed by: STUDENT IN AN ORGANIZED HEALTH CARE EDUCATION/TRAINING PROGRAM

## 2023-09-28 PROCEDURE — 99214 OFFICE O/P EST MOD 30 MIN: CPT | Performed by: STUDENT IN AN ORGANIZED HEALTH CARE EDUCATION/TRAINING PROGRAM

## 2023-09-28 PROCEDURE — 3074F SYST BP LT 130 MM HG: CPT | Performed by: STUDENT IN AN ORGANIZED HEALTH CARE EDUCATION/TRAINING PROGRAM

## 2023-09-28 PROCEDURE — 3078F DIAST BP <80 MM HG: CPT | Performed by: STUDENT IN AN ORGANIZED HEALTH CARE EDUCATION/TRAINING PROGRAM

## 2023-09-28 RX ORDER — ESOMEPRAZOLE MAGNESIUM 40 MG/1
40 CAPSULE, DELAYED RELEASE ORAL PRN
Status: ON HOLD | COMMUNITY
End: 2023-11-06

## 2023-09-28 RX ORDER — RIMEGEPANT SULFATE 75 MG/75MG
1 TABLET, ORALLY DISINTEGRATING ORAL PRN
Qty: 10 TABLET | Refills: 5 | Status: SHIPPED | OUTPATIENT
Start: 2023-09-28 | End: 2023-12-22 | Stop reason: SDUPTHER

## 2023-09-28 RX ORDER — ZOLPIDEM TARTRATE 10 MG/1
1 TABLET ORAL
Status: ON HOLD | COMMUNITY
End: 2023-11-06

## 2023-09-28 RX ORDER — LORAZEPAM 0.5 MG/1
0.5 TABLET ORAL
Status: ON HOLD | COMMUNITY
End: 2023-11-10 | Stop reason: SDUPTHER

## 2023-09-28 ASSESSMENT — FIBROSIS 4 INDEX: FIB4 SCORE: 0.87

## 2023-09-28 NOTE — PATIENT INSTRUCTIONS
NEUROLOGY CLINIC VISIT WITH DR. CHARLES       PATIENT EXPECTATIONS AND IMPORTANT APPOINTMENT REMINDERS:   You matter to Dr. Charles and you deserve the best care.   Dr. Charles prides himself on providing the best possible care to all his patients. He strives to make each appointment meaningful, so that all your concerns are being addressed and all your neurological problems are being optimally treated. In order to achieve these goals for everyone, Dr. Charles has listed important reminders and the best ways to prepare for each appointment. Please read each item carefully. Thank you!    REFILLS:   Request refills AT LEAST 1 week in advance to ensure you do not run out of medications    [employer]  It is STRONGLY encouraged that ALL patients sign up for [employer]. It is BY FAR the fastest and most convenient way for both Dr. Charles and patients to obtain timely refills.  If you are having trouble signing up or logging into your account, staff are available to help you. Please ask a medical assistant or staff at the  to assist you.    TEST RESULS:   All labs and diagnostic test results will be reviewed at your next visit, UNLESS  Dr. Charles determines that there are important findings on the tests need to be acted on sooner. Dr. Charles will either call or send a message through [employer] if this is the case.    BE PREPARED PRIOR TO EVERY APPOINTMENT:  All patient are responsible for ensuring that ALL test results that were completed outside of the [employer] system have been received by our Neurology Department PRIOR to your appointment with Dr. Charles.    IMPORTANT:  ALL images (not just the reports) must be sent and uploaded to the [employer] system. Dr. Charles reviews all images personally prior to each visit. Ensuring that ALL the test results and test images are accessible to Dr. Charles prior to your appointment is YOUR responsibility and an important part of making the most out of each appointment.   Bring a  [facility] picture ID and an updated insurance card EVERY visit.  It is highly recommended that you bring at every visit a list of the most important topics that you want address. While it may not be possible to address all items on the list in a single visit, preparing a list will ensure that Dr. Charles addresses the items that are most important to you and your health    PAPERWORK, DOCUMENTATION, LETTER REQUESTS:  You must notify the office ahead of your appointment of all paperwork or letter requests.   Please DO NOT wait until the last minute to make these requests. Please give all paperwork to the medical assistant at the start of the appointment and check-in process. Please note that Dr. Charles may not be able complete some types of documentation in a single appointment or even within a single day or week. This is why it is important to communicate paperwork requests prior to your appointment and at least 2 weeks prior to any deadlines.    KNOW ALL YOU MEDICATIONS:   AT EVERY SINGLE APPOINTMENT, please bring a list of every single prescribed, non-prescribed, and over the counter medication or supplements you are taking, including ones taken on a rare or intermittent basis.  Include the following information for each prescribed or non-prescribed medications:  Name of medication   The strength of EACH pill/capsule/tablet, etc.   The number of pills/capsules/tablets, etc taken per dose  The number and time of day that doses are taken  For every single Supplement that you take on a routine or intermittent basis, you must include:  The Brand Name   A complete list of every single ingredient, compound, vitamin, and/or mineral in each dose, along with the corresponding amounts/strengths of all ingredients, vitamins, minerals, etc., if such information is provided or known  The number of doses taken per day and time of day doses are taken  If medications are taken on an intermittent or as needed basis, please  "estimate how many days per week or days per month the medications are used  DO NOT just print out your medication list from [employer] or bring a list from a prior appointment or hospitalizations because the information is often often unreliable, inaccurate, outdated, and/or incomplete   The list should be printed or written  If you forget or do not have a list of all the medication, then it is acceptable, although less preferred, to bring all the bottles to the appointment     ARRIVE EARLY FOR ALL VISITS:  Please note that we are unable to accommodate late arrivals as per office policy.  YOU-the patient - (NOT a parent, spouse, or friend) must be physically present at check-in no later than 12 minutes after the scheduled appointment time, or you will be asked to reschedule   Consider scheduling a virtual appointment with Dr. Charles through [employer] as an alternative if transportation to the clinic is difficult or unavailable   Please note, however, that virtual visits can only be scheduled after being an established patient of Dr. Charles. All new appointments must be done in-person in clinic  Some insurances will not cover the cost of virtual appointments. Please check with your insurance to find out if these visits are covered    COMMUNICATING URGENT AND NON-URGENT MATTERS:  Your concerns are important and deserve to be heard and addressed. If you have an urgent matter, there are two methods that will ensure your concerns are prioritized appropriately:   Preferred method: Sign-up/Login to your [employer] account and send a message addressed to Dr. Charles or Kellie Galvez (Dr. Charles's assistant). In the subject line, type \"urgent\" followed by a word or phrase describing the situation (For example, write \"Urgent: Out of antiseuzre med and need refill\" or \"Urgent: Severe side effects to new meds\". In doing this, our staff can ensure urgent messages are triaged appropriately and communicated to Dr. Charles that day.  Call " [facility] main line at [phone]. Dr. Charles's voicemail extension is [phone]. When leaving a voice message, specifically indicate if it is urgent (or non-urgent) so that the matter can be triaged appropriately and addressed in a timely manner    Thank you for taking important action in your care!

## 2023-09-28 NOTE — TELEPHONE ENCOUNTER
Received Refill PA request via [employer]  for Nurtec 75mg tab . (Quantity:10 tab, Day Supply:30)     Insurance: [employer]  Member ID:  [id number]  BIN: [id number]  PCN: [id number]  [id number]     Ran Test claim via [place] & medication Rejects stating prior authorization is required.

## 2023-09-28 NOTE — PROGRESS NOTES
"NEUROLOGY FOLLOW-UP - 09/28/2023     REASON FOR VISIT: Sarita Loera 57 y.o. female presents today for follow-up.     SUMMARY PROBLEMS/MEDICAL CONDITIONS AND PRIOR MED HX:    INTERVAL HISTORY:            Patient returns for follow-up. She is still ahving nocturnal seizures, convulsions, at least a few times in the alst 3 months. One convulsion resulteed in fracture of her tooth. VNS remains uncomfortable at times. She reports some new chest pain the past fcouple of days, non-extertional but sharp and radiating down the arm. [facility] and was told she was \"fine\" but apparently did not run any tests.     She is still smoking. Migraines are getting worse and she has been using triptans.     CURRENT MEDICATIONS AT THE TIME OF THIS ENCOUNTER:  Current Outpatient Medications on File Prior to Visit   Medication Sig Dispense Refill    esomeprazole (NEXIUM) 40 MG delayed-release capsule Take 40 mg by mouth as needed.      LORazepam (ATIVAN) 0.5 MG Tab Take 0.5 mg by mouth 1 time a day as needed.      zolpidem (AMBIEN) 10 MG Tab Take 1 Tablet by mouth every day.      cloBAZam 20 MG Tab TAKE 3 TABLETS BY MOUTH IN THE EVENING FOR 30 DAYS 90 Tablet 0    LamoTRIgine 200 MG TABLET SR 24 HR TAKE 1 TABLET BY MOUTH IN THE MORNING AND 1 AT NOON AND 1 AT BEDTIME 90 Tablet 5    B Complex-C-Biotin-D-Zinc-FA (VITAL-D RX PO) Take  by mouth.      VITAMIN D PO Take  by mouth. Indications: Patient did not know how much she takes.      Acetaminophen 325 MG Cap Tylenol       No current facility-administered medications on file prior to visit.        EXAM:   /58 (BP Location: Left arm, Patient Position: Sitting, BP Cuff Size: Adult)   Pulse 70   Temp 36.2 °C (97.1 °F) (Temporal)   Ht 1.753 m (5' 9.02\")   Wt 80.5 kg (177 lb 7.5 oz)   SpO2 94%         Physical Exam:  Physical Exam  Constitutional:       Appearance: She is well-developed.   HENT:      Head: Normocephalic and atraumatic.      Nose: Nose normal. "   Eyes:      Pupils: Pupils are equal, round, and reactive to light.   Cardiovascular:      Rate and Rhythm: Normal rate and regular rhythm.   Pulmonary:      Effort: Pulmonary effort is normal.   Musculoskeletal:         General: Normal range of motion.      Cervical back: Normal range of motion.   Skin:     General: Skin is warm.   Neurological:      Mental Status: She is alert and oriented to person, place, and time.      Motor: Motor strength is normal.No abnormal muscle tone.      Gait: Gait normal.      Comments: No observable changes in neurologic status.  See initial new patient examination for details.    Psychiatric:         Mood and Affect: Mood is depressed.        Neurological Exam   Neurological Exam  Mental Status  Alert. Oriented to person, place, and time. Language is fluent with no aphasia.    Cranial Nerves  CN III, IV, VI: Pupils equal round and reactive to light bilaterally.    Motor   The following abnormal movements were seen: Strength is 5/5 throughout all four extremities.  Head tremor, titubation noted.    Gait   Normal gait.Casual gait is normal including stance, stride, and arm swing.  No observable changes in neurologic status.  See initial new patient examination for details. .       PERTINENT NOTES AND DIAGNOSTICS DATA PERSONALLY REVIEW:      ASSESSMENT, EDUCATION, AND COUNSELING:  This is a 57 y.o. female patient who presents to the neurology clinic. We had an extensive discussion about the patient's symptoms, signs, and work-up to date, if any. We discussed potential and/or definitive diagnoses, work-up, and potential treatments.       PLAN:  If applicable, the work-up such as labs, imaging, procedures, and/or other testing, referrals, and/or recommended treatment strategies are listed below.    Visit Diagnoses     ICD-10-CM   1. Chest pain, unspecified type  R07.9   2. Intractable seizure disorder (HCC)  G40.919   3. Other migraine without status migrainosus, not intractable   G43.809   4. Status post placement of VNS (vagus nerve stimulation) device  Z96.89   5. Tremor  R25.1   6. Cervicalgia  M54.2   7. Smoker  F17.200   8. Anxiety  F41.9   9. Spinal stenosis in cervical region  M48.02   10. Cosme's esophagus with dysplasia  K22.719   11. Palpitations  R00.2   12. Nocturnal seizures (HCC)  R56.9        Orders Placed This Encounter    TROPONIN    TSH+T4F+T3FREE    Referral to Neurodiagnostics (EEG,EP,EMG/NCS/DBS)    EKG    esomeprazole (NEXIUM) 40 MG delayed-release capsule    LORazepam (ATIVAN) 0.5 MG Tab    zolpidem (AMBIEN) 10 MG Tab    Rimegepant Sulfate (NURTEC) 75 MG TABLET DISPERSIBLE        Patient with intractable seizures, most of them nocturnal convulsions, that are still occurring with a high frequency (at least a few every couple of months). Although they have overall decreased in frequency  over the past 6-12 months from multiple convulsions per month, she still is uncontrolled and remains at high risk of SUDEP. She needs admission to the EMU to get her seizures under control, optimize her medication, and for pre-surgical evaluation. She has worsening migraines too and I advised that she stop taking her triptans in the setting of new onset chest pain while we start a work-up to exclude cardiovascular disease. No changes to her AEDs. Will start nurtec in place of triptan for rescue medication. Will plan to check VNS when she is admitted to the EMU in the next 1-2 months    She was reminded to stop triptan and to start nurtec which I am prescribing today for her for her migraines. Need to continue to discuss smoking cessation. Will also ordering additional labs to evalaute cause of chest pain and worsening tremor.      BILLING DOCUMENTATION:     I spent a total of I spent a total of 30 minutes on the day of the visit.   of face-to-face time in this visit. Over 50% of the time of the visit today was spent on counseling and/or coordination of care wtih the patient and/or  family, as above in assessment in plan.    Douglas Charles MD  Department of Neurology at [facility]  Diplomate of the American Board of Psychiatry and Neurology, General Neurology  Diplomate of American Board of Psychiatry and Neurology, a Member Board of the American Board of Medical Subspecialties, Epilepsy  Director of [facility] Level III Comprehensive Epilepsy Program  Professor of Clinical Neurology, [facility].   [address], [address]  [facility] [id number] [phone]   Fax: [phone]  E-mail: [email or web address]

## 2023-09-28 NOTE — TELEPHONE ENCOUNTER
PA submitted via [facility] (Key: [id number] ) awaiting response TAT 24-72 hrs. - 09/28/2023 12:06PM

## 2023-09-29 NOTE — TELEPHONE ENCOUNTER
Prior Authorization for Nurtec 75mg tab has been approved for a quantity of 10 tab , day supply 30    Prior Authorization reference number: n/a  Insurance: [employer]  Effective dates: 09/28/2023 to  03/26/2024  Copay: $0.00     Is patient eligible to fill with [facility]? Yes

## 2023-11-05 DIAGNOSIS — R11.2 NAUSEA AND VOMITING, UNSPECIFIED VOMITING TYPE: ICD-10-CM

## 2023-11-05 DIAGNOSIS — R56.9 SEIZURES (HCC): ICD-10-CM

## 2023-11-05 DIAGNOSIS — Z29.9 PROPHYLACTIC MEASURE: ICD-10-CM

## 2023-11-05 DIAGNOSIS — R52 PAIN: ICD-10-CM

## 2023-11-05 DIAGNOSIS — G40.919 INTRACTABLE SEIZURE DISORDER (HCC): ICD-10-CM

## 2023-11-05 DIAGNOSIS — F17.219 CIGARETTE NICOTINE DEPENDENCE WITH NICOTINE-INDUCED DISORDER: ICD-10-CM

## 2023-11-05 RX ORDER — LORAZEPAM 2 MG/ML
1 INJECTION INTRAMUSCULAR
Status: CANCELLED | OUTPATIENT
Start: 2023-11-05

## 2023-11-05 RX ORDER — ONDANSETRON 2 MG/ML
4 INJECTION INTRAMUSCULAR; INTRAVENOUS EVERY 4 HOURS PRN
Status: CANCELLED | OUTPATIENT
Start: 2023-11-05

## 2023-11-05 RX ORDER — ONDANSETRON 4 MG/1
4 TABLET, ORALLY DISINTEGRATING ORAL EVERY 4 HOURS PRN
Status: CANCELLED | OUTPATIENT
Start: 2023-11-05

## 2023-11-05 RX ORDER — BISACODYL 10 MG
10 SUPPOSITORY, RECTAL RECTAL
Status: CANCELLED | OUTPATIENT
Start: 2023-11-05

## 2023-11-05 RX ORDER — AMOXICILLIN 250 MG
2 CAPSULE ORAL 2 TIMES DAILY
Status: CANCELLED | OUTPATIENT
Start: 2023-11-05

## 2023-11-05 RX ORDER — IBUPROFEN 600 MG/1
600 TABLET ORAL EVERY 6 HOURS PRN
Status: CANCELLED | OUTPATIENT
Start: 2023-11-05

## 2023-11-05 RX ORDER — LORAZEPAM 2 MG/ML
2 INJECTION INTRAMUSCULAR
Status: CANCELLED | OUTPATIENT
Start: 2023-11-05

## 2023-11-05 RX ORDER — ENOXAPARIN SODIUM 100 MG/ML
40 INJECTION SUBCUTANEOUS DAILY
Status: CANCELLED | OUTPATIENT
Start: 2023-11-05

## 2023-11-05 RX ORDER — NICOTINE 21 MG/24HR
21 PATCH, TRANSDERMAL 24 HOURS TRANSDERMAL
Status: CANCELLED | OUTPATIENT
Start: 2023-11-06

## 2023-11-05 RX ORDER — POLYETHYLENE GLYCOL 3350 17 G/17G
1 POWDER, FOR SOLUTION ORAL
Status: CANCELLED | OUTPATIENT
Start: 2023-11-05

## 2023-11-06 ENCOUNTER — HOSPITAL ENCOUNTER (INPATIENT)
Facility: MEDICAL CENTER | Age: 58
LOS: 4 days | DRG: 101 | End: 2023-11-10
Attending: STUDENT IN AN ORGANIZED HEALTH CARE EDUCATION/TRAINING PROGRAM | Admitting: STUDENT IN AN ORGANIZED HEALTH CARE EDUCATION/TRAINING PROGRAM
Payer: MEDICAID

## 2023-11-06 DIAGNOSIS — Z29.9 PROPHYLACTIC MEASURE: ICD-10-CM

## 2023-11-06 DIAGNOSIS — F41.9 ANXIETY: ICD-10-CM

## 2023-11-06 DIAGNOSIS — G89.29 CHRONIC INTRACTABLE HEADACHE, UNSPECIFIED HEADACHE TYPE: ICD-10-CM

## 2023-11-06 DIAGNOSIS — G43.711 INTRACTABLE CHRONIC MIGRAINE WITHOUT AURA AND WITH STATUS MIGRAINOSUS: ICD-10-CM

## 2023-11-06 DIAGNOSIS — R52 PAIN: ICD-10-CM

## 2023-11-06 DIAGNOSIS — G40.919 INTRACTABLE SEIZURE DISORDER (HCC): ICD-10-CM

## 2023-11-06 DIAGNOSIS — R11.2 NAUSEA AND VOMITING, UNSPECIFIED VOMITING TYPE: ICD-10-CM

## 2023-11-06 DIAGNOSIS — R51.9 CHRONIC INTRACTABLE HEADACHE, UNSPECIFIED HEADACHE TYPE: ICD-10-CM

## 2023-11-06 DIAGNOSIS — R56.9 SEIZURES (HCC): ICD-10-CM

## 2023-11-06 DIAGNOSIS — F17.219 CIGARETTE NICOTINE DEPENDENCE WITH NICOTINE-INDUCED DISORDER: ICD-10-CM

## 2023-11-06 LAB
ALBUMIN SERPL BCP-MCNC: 4 G/DL (ref 3.2–4.9)
ALBUMIN/GLOB SERPL: 1.7 G/DL
ALP SERPL-CCNC: 112 U/L (ref 30–99)
ALT SERPL-CCNC: 21 U/L (ref 2–50)
ANION GAP SERPL CALC-SCNC: 12 MMOL/L (ref 7–16)
AST SERPL-CCNC: 24 U/L (ref 12–45)
BASOPHILS # BLD AUTO: 0.5 % (ref 0–1.8)
BASOPHILS # BLD: 0.04 K/UL (ref 0–0.12)
BILIRUB SERPL-MCNC: 0.2 MG/DL (ref 0.1–1.5)
BUN SERPL-MCNC: 20 MG/DL (ref 8–22)
CALCIUM ALBUM COR SERPL-MCNC: 9 MG/DL (ref 8.5–10.5)
CALCIUM SERPL-MCNC: 9 MG/DL (ref 8.5–10.5)
CHLORIDE SERPL-SCNC: 106 MMOL/L (ref 96–112)
CO2 SERPL-SCNC: 24 MMOL/L (ref 20–33)
CREAT SERPL-MCNC: 0.69 MG/DL (ref 0.5–1.4)
EOSINOPHIL # BLD AUTO: 0.11 K/UL (ref 0–0.51)
EOSINOPHIL NFR BLD: 1.3 % (ref 0–6.9)
ERYTHROCYTE [DISTWIDTH] IN BLOOD BY AUTOMATED COUNT: 47 FL (ref 35.9–50)
GFR SERPLBLD CREATININE-BSD FMLA CKD-EPI: 100 ML/MIN/1.73 M 2
GLOBULIN SER CALC-MCNC: 2.4 G/DL (ref 1.9–3.5)
GLUCOSE SERPL-MCNC: 90 MG/DL (ref 65–99)
HCT VFR BLD AUTO: 45.2 % (ref 37–47)
HGB BLD-MCNC: 14.7 G/DL (ref 12–16)
IMM GRANULOCYTES # BLD AUTO: 0.02 K/UL (ref 0–0.11)
IMM GRANULOCYTES NFR BLD AUTO: 0.2 % (ref 0–0.9)
LYMPHOCYTES # BLD AUTO: 2 K/UL (ref 1–4.8)
LYMPHOCYTES NFR BLD: 23.8 % (ref 22–41)
MCH RBC QN AUTO: 29.6 PG (ref 27–33)
MCHC RBC AUTO-ENTMCNC: 32.5 G/DL (ref 32.2–35.5)
MCV RBC AUTO: 90.9 FL (ref 81.4–97.8)
MONOCYTES # BLD AUTO: 0.62 K/UL (ref 0–0.85)
MONOCYTES NFR BLD AUTO: 7.4 % (ref 0–13.4)
NEUTROPHILS # BLD AUTO: 5.62 K/UL (ref 1.82–7.42)
NEUTROPHILS NFR BLD: 66.8 % (ref 44–72)
NRBC # BLD AUTO: 0 K/UL
NRBC BLD-RTO: 0 /100 WBC (ref 0–0.2)
PLATELET # BLD AUTO: 219 K/UL (ref 164–446)
PMV BLD AUTO: 8.7 FL (ref 9–12.9)
POTASSIUM SERPL-SCNC: 4.3 MMOL/L (ref 3.6–5.5)
PROT SERPL-MCNC: 6.4 G/DL (ref 6–8.2)
RBC # BLD AUTO: 4.97 M/UL (ref 4.2–5.4)
SODIUM SERPL-SCNC: 142 MMOL/L (ref 135–145)
WBC # BLD AUTO: 8.4 K/UL (ref 4.8–10.8)

## 2023-11-06 PROCEDURE — 85025 COMPLETE CBC W/AUTO DIFF WBC: CPT

## 2023-11-06 PROCEDURE — 700111 HCHG RX REV CODE 636 W/ 250 OVERRIDE (IP): Mod: JZ | Performed by: STUDENT IN AN ORGANIZED HEALTH CARE EDUCATION/TRAINING PROGRAM

## 2023-11-06 PROCEDURE — A9270 NON-COVERED ITEM OR SERVICE: HCPCS | Performed by: STUDENT IN AN ORGANIZED HEALTH CARE EDUCATION/TRAINING PROGRAM

## 2023-11-06 PROCEDURE — 99223 1ST HOSP IP/OBS HIGH 75: CPT | Mod: 25 | Performed by: STUDENT IN AN ORGANIZED HEALTH CARE EDUCATION/TRAINING PROGRAM

## 2023-11-06 PROCEDURE — 95720 EEG PHY/QHP EA INCR W/VEEG: CPT | Performed by: STUDENT IN AN ORGANIZED HEALTH CARE EDUCATION/TRAINING PROGRAM

## 2023-11-06 PROCEDURE — 95714 VEEG EA 12-26 HR UNMNTR: CPT | Performed by: STUDENT IN AN ORGANIZED HEALTH CARE EDUCATION/TRAINING PROGRAM

## 2023-11-06 PROCEDURE — 95700 EEG CONT REC W/VID EEG TECH: CPT | Performed by: STUDENT IN AN ORGANIZED HEALTH CARE EDUCATION/TRAINING PROGRAM

## 2023-11-06 PROCEDURE — 36415 COLL VENOUS BLD VENIPUNCTURE: CPT

## 2023-11-06 PROCEDURE — 770020 HCHG ROOM/CARE - TELE (206)

## 2023-11-06 PROCEDURE — 700102 HCHG RX REV CODE 250 W/ 637 OVERRIDE(OP): Performed by: STUDENT IN AN ORGANIZED HEALTH CARE EDUCATION/TRAINING PROGRAM

## 2023-11-06 PROCEDURE — 4A10X4Z MONITORING OF CENTRAL NERVOUS ELECTRICAL ACTIVITY, EXTERNAL APPROACH: ICD-10-PCS | Performed by: STUDENT IN AN ORGANIZED HEALTH CARE EDUCATION/TRAINING PROGRAM

## 2023-11-06 PROCEDURE — 80053 COMPREHEN METABOLIC PANEL: CPT

## 2023-11-06 RX ORDER — CLOBAZAM 20 MG/1
20-40 TABLET ORAL 2 TIMES DAILY
Status: ON HOLD | COMMUNITY
End: 2023-11-10

## 2023-11-06 RX ORDER — NICOTINE 21 MG/24HR
21 PATCH, TRANSDERMAL 24 HOURS TRANSDERMAL
Status: DISCONTINUED | OUTPATIENT
Start: 2023-11-06 | End: 2023-11-10 | Stop reason: HOSPADM

## 2023-11-06 RX ORDER — LORAZEPAM 2 MG/ML
1 INJECTION INTRAMUSCULAR
Status: DISCONTINUED | OUTPATIENT
Start: 2023-11-06 | End: 2023-11-10 | Stop reason: HOSPADM

## 2023-11-06 RX ORDER — ONDANSETRON 2 MG/ML
4 INJECTION INTRAMUSCULAR; INTRAVENOUS EVERY 4 HOURS PRN
Status: DISCONTINUED | OUTPATIENT
Start: 2023-11-06 | End: 2023-11-10 | Stop reason: HOSPADM

## 2023-11-06 RX ORDER — LAMOTRIGINE 100 MG/1
100 TABLET ORAL 3 TIMES DAILY
Status: DISCONTINUED | OUTPATIENT
Start: 2023-11-06 | End: 2023-11-06

## 2023-11-06 RX ORDER — SUMATRIPTAN 50 MG/1
50 TABLET, FILM COATED ORAL
Status: DISCONTINUED | OUTPATIENT
Start: 2023-11-06 | End: 2023-11-06

## 2023-11-06 RX ORDER — AMOXICILLIN 250 MG
2 CAPSULE ORAL 2 TIMES DAILY
Status: DISCONTINUED | OUTPATIENT
Start: 2023-11-06 | End: 2023-11-10 | Stop reason: HOSPADM

## 2023-11-06 RX ORDER — LORAZEPAM 2 MG/ML
2 INJECTION INTRAMUSCULAR
Status: DISCONTINUED | OUTPATIENT
Start: 2023-11-06 | End: 2023-11-10 | Stop reason: HOSPADM

## 2023-11-06 RX ORDER — POLYETHYLENE GLYCOL 3350 17 G/17G
1 POWDER, FOR SOLUTION ORAL
Status: DISCONTINUED | OUTPATIENT
Start: 2023-11-06 | End: 2023-11-10 | Stop reason: HOSPADM

## 2023-11-06 RX ORDER — ACETAMINOPHEN 500 MG
500-1000 TABLET ORAL EVERY 6 HOURS PRN
COMMUNITY

## 2023-11-06 RX ORDER — BISACODYL 10 MG
10 SUPPOSITORY, RECTAL RECTAL
Status: DISCONTINUED | OUTPATIENT
Start: 2023-11-06 | End: 2023-11-10 | Stop reason: HOSPADM

## 2023-11-06 RX ORDER — IBUPROFEN 600 MG/1
600 TABLET ORAL EVERY 6 HOURS PRN
Status: DISCONTINUED | OUTPATIENT
Start: 2023-11-06 | End: 2023-11-06

## 2023-11-06 RX ORDER — KETOROLAC TROMETHAMINE 30 MG/ML
30 INJECTION, SOLUTION INTRAMUSCULAR; INTRAVENOUS EVERY 6 HOURS PRN
Status: DISCONTINUED | OUTPATIENT
Start: 2023-11-06 | End: 2023-11-10 | Stop reason: HOSPADM

## 2023-11-06 RX ORDER — CLOBAZAM 10 MG/1
20 TABLET ORAL NIGHTLY
Status: DISCONTINUED | OUTPATIENT
Start: 2023-11-06 | End: 2023-11-09

## 2023-11-06 RX ORDER — ONDANSETRON 4 MG/1
4 TABLET, ORALLY DISINTEGRATING ORAL EVERY 4 HOURS PRN
Status: DISCONTINUED | OUTPATIENT
Start: 2023-11-06 | End: 2023-11-10 | Stop reason: HOSPADM

## 2023-11-06 RX ORDER — ENOXAPARIN SODIUM 100 MG/ML
40 INJECTION SUBCUTANEOUS DAILY
Status: DISCONTINUED | OUTPATIENT
Start: 2023-11-06 | End: 2023-11-10 | Stop reason: HOSPADM

## 2023-11-06 RX ORDER — LAMOTRIGINE 200 MG/1
200-400 TABLET, EXTENDED RELEASE ORAL 2 TIMES DAILY
Status: ON HOLD | COMMUNITY
End: 2023-11-10

## 2023-11-06 RX ADMIN — LAMOTRIGINE 100 MG: 100 TABLET ORAL at 21:11

## 2023-11-06 RX ADMIN — CLOBAZAM 20 MG: 10 TABLET ORAL at 21:11

## 2023-11-06 RX ADMIN — DOCUSATE SODIUM 50 MG AND SENNOSIDES 8.6 MG 2 TABLET: 8.6; 5 TABLET, FILM COATED ORAL at 17:15

## 2023-11-06 RX ADMIN — KETOROLAC TROMETHAMINE 30 MG: 30 INJECTION, SOLUTION INTRAMUSCULAR; INTRAVENOUS at 12:16

## 2023-11-06 RX ADMIN — IBUPROFEN 600 MG: 600 TABLET, FILM COATED ORAL at 08:26

## 2023-11-06 RX ADMIN — DOCUSATE SODIUM 50 MG AND SENNOSIDES 8.6 MG 2 TABLET: 8.6; 5 TABLET, FILM COATED ORAL at 08:15

## 2023-11-06 RX ADMIN — ENOXAPARIN SODIUM 40 MG: 100 INJECTION SUBCUTANEOUS at 17:15

## 2023-11-06 RX ADMIN — LAMOTRIGINE 100 MG: 100 TABLET ORAL at 15:07

## 2023-11-06 ASSESSMENT — LIFESTYLE VARIABLES
TOTAL SCORE: 0
HAVE PEOPLE ANNOYED YOU BY CRITICIZING YOUR DRINKING: NO
TOTAL SCORE: 0
EVER HAD A DRINK FIRST THING IN THE MORNING TO STEADY YOUR NERVES TO GET RID OF A HANGOVER: NO
HAVE YOU EVER FELT YOU SHOULD CUT DOWN ON YOUR DRINKING: NO
TOTAL SCORE: 0
CONSUMPTION TOTAL: INCOMPLETE
EVER FELT BAD OR GUILTY ABOUT YOUR DRINKING: NO
ALCOHOL_USE: NO

## 2023-11-06 ASSESSMENT — FIBROSIS 4 INDEX: FIB4 SCORE: 0.87

## 2023-11-06 ASSESSMENT — PAIN DESCRIPTION - PAIN TYPE
TYPE: ACUTE PAIN

## 2023-11-06 NOTE — PROCEDURES
VIDEO ELECTROENCEPHALOGRAM REPORT - EPILEPSY MONITORING UNIT (EMU)     REFERRING PROVIDER: Dr. Charles  DOS: 11/6/2023  ROOM: [facility]/   TOTAL RECORDING TIME: 19 hours and 37 minutes of total recording time    INDICATION:  Sarita Loera 57 y.o. female presenting with seizure(s) and unspecified convulsions    RELEVANT MEDICATIONS/TREATMENTS:   Onfi 20 mg QHS  Lamictal    TECHNIQUE:   CVEEG was set up by a Neurodiagnostic technologist who performed education to the patient and staff. A minimum of 23 electrodes and 23 channel recording was setup and performed by Neurodiagnostic technologist, in accordance with the international 10-20 system. Impedence, electrode integrity, and technical impressions were documented a minimum of every 2-24 hour period by a Neurodiagnostic Technologist and reviewed by Interpreting Physician. The study was reviewed in bipolar and referential montages. The recording examined the patient in the awake and drowsy/sleep state(s).     DESCRIPTION OF THE RECORD:  During wakefulness, the background was continuous and showed a 10 Hz posterior dominant rhythm.  There was reactivity to eye closure/opening.  A normal anterior-posterior gradient was noted with faster beta frequencies seen anteriorly.  During drowsiness, theta/delta frequencies were seen.    Sleep was captured and was characterized by diffuse background delta/theta activity with a loss of myogenic artifact.  N2 sleep transients in the form of sleep spindles and vertex waves were seen in the leads over the central regions.     ICTAL AND INTERICTAL FINDINGS:   Occasional sharply contoured right or left temporal transients, suspicious but not definitively epileptiform. Ultimately, No definitive focal or generalized epileptiform activity noted.    Rare right posterior inferior temporal sharply contoured slowing, rarely quasi-rhythmic at 2.5-3.5 Hz.      During drowsiness and sleep, the emergency of intermittent right>left inferior  posterior temporal-parietal sharply contoured theta/delta slowing. This finding, however, may be artifact.     No seizures    ACTIVATION PROCEDURES:   Hyperventilation was performed by the patient for a total of 3 minutes. The technician performing the test noted good effort. This technique did not elicited any abnormalities on EEG.  and Intermittent Photic stimulation was performed in a stepwise fashion from 1 to 30 Hz and did not elicited any abnormalities on EEG.     EKG: Sampling of the EKG recording showed abnormal QRS and ST segment changes.    EVENTS:  None    INTERPRETATION:  Mildly abnormal video EEG recording in the awake and drowsy/sleep state(s):  - Rare right posterior inferior temporal sharply contoured slowing, rarely quasi-rhythmic at 2.5-3.5 Hz.    - No definitive epileptiform discharges were seen.  - No definitive seizures. Clinical correlation is recommended.  -No clinical events      Douglas Charles MD  Department of Neurology at [facility]  General Neurologist and Epileptologist  Director of [facility] Level III Comprehensive Epilepsy Program  Professor of Clinical Neurology, [facility].   Phone: [phone]  Fax: [phone]  E-mail: [email or web address]

## 2023-11-06 NOTE — PROGRESS NOTES
4 Eyes Skin Assessment Completed by ARIE Cummings and ARIE Martinez.    Head WDL  Ears WDL  Nose WDL  Mouth WDL  Neck WDL  Breast/Chest WDL  Shoulder Blades WDL  Spine Redness and Blanching  (R) Arm/Elbow/Hand WDL  (L) Arm/Elbow/Hand WDL  Abdomen WDL  Groin WDL  Scrotum/Coccyx/Buttocks Redness, per pt it is a bug bite  (R) Leg WDL  (L) Leg WDL  (R) Heel/Foot/Toe Redness and Blanching  (L) Heel/Foot/Toe Redness and Blanching          Devices In Places Tele Box, Pulse Ox, and SCD's      Interventions In Place Pillows, Heels Loaded W/Pillows, and Pressure Redistribution Mattress    Possible Skin Injury No    Pictures Uploaded Into Epic Yes  Wound Consult Placed Yes for red bug bite per pt   RN Wound Prevention Protocol Ordered No

## 2023-11-06 NOTE — H&P
NEUROLOGY EMU H&P 11/6/2023     REFERRING PROVIDER: Dr. Charles    REASON FOR ADMISSION: Reason for EMU Admission: Medication titration/optimization, Determine degree of epileptic burden, if any, Determine presence of unrecognized and/or elecrographic seizures, Seizure localization and lateralization, and Control seizure     HISTORY OF PRESENT ILLNESS: Sarita Loera is a 57 y.o. female who presents to the EMU for evaluation.    RELEVANT PMHx:      Patient with intractable seizures, most of them nocturnal convulsions, that are still occurring with a high frequency (at least a few every couple of months). Although they have overall decreased in frequency  over the past 6-12 months from multiple convulsions per month, she still is uncontrolled and remains at high risk of SUDEP. She needs admission to the EMU to get her seizures under control, optimize her medication, and for pre-surgical evaluation. She has worsening migraines too and I advised that she stop taking her triptans in the setting of new onset chest pain while we start a work-up to exclude cardiovascular disease. No changes to her AEDs. Will start nurtec in place of triptan for rescue medication. Will plan to check VNS when during this EMU admission    Patient's current AED regimen is Lamictal 100 mg TID and Onfi 20 mg QHS,    Of note, patient had a prior 48 hour ambulatory EEG done December 2021 that was normal.        COMPLETE HOME MED LIST:     Current Facility-Administered Medications:     enoxaparin (LOVENOX) injection    LORazepam **OR** LORazepam    nicotine **AND** Nicotine Replacement Patient Education Materials **AND** nicotine polacrilex    ondansetron **OR** ondansetron    senna-docusate **AND** polyethylene glycol/lytes **AND** magnesium hydroxide **AND** bisacodyl    ketorolac    SUMAtriptan     MEDICATION ALLERGIES:  No Known Allergies    REVIEW OF SYSTEMS:   ROS negative except that which is mentioned above    PAST MEDICAL HISTORY:    Past Medical History:   Diagnosis Date    Cosme's esophagus     Bowel habit changes     constipated    Pain     neck pain    Pneumonia 08/2018    history    Psychiatric problem     anxiety    Seizure (HCC)     Nocturnal seizures.  last one October 30 2018, grand mal    Tuberculosis     patient states that she has never had TB but has tested positive x 1 with skin test, but upon other testing was told she did not have TB     PAST SURGICAL HISTORY:   Past Surgical History:   Procedure Laterality Date    NERVE STIMULATOR VAGUS  11/14/2018    Procedure: NERVE STIMULATOR VAGUS PROGRAMMING;  Surgeon: Nelsy Ratliff M.D.;  [facility];  Service: Ent    GASTROSCOPY  11/29/2016    Procedure: GASTROSCOPY;  Surgeon: Abel Dennis M.D.;  Location: SURGERY [facility] ORS;  Service:     COLONOSCOPY  11/29/2016    Procedure: COLONOSCOPY;  Surgeon: Abel Dennis M.D.;  Location: SURGERY [facility] ORS;  Service:     OTHER  2014    Neck    OTHER Left 2008    cyst removal from left breast    GYN SURGERY  1997    Tubal ligation.     FAMILY HISTORY:   No family history on file.  SOCIAL HISTORY:   Social History     Socioeconomic History    Marital status:      Spouse name: Not on file    Number of children: Not on file    Years of education: Not on file    Highest education level: Not on file   Occupational History    Not on file   Tobacco Use    Smoking status: Every Day     Current packs/day: 0.50     Average packs/day: 0.5 packs/day for 36.0 years (18.0 ttl pk-yrs)     Types: Cigarettes    Smokeless tobacco: Never   Vaping Use    Vaping Use: Never used   Substance and Sexual Activity    Alcohol use: No    Drug use: Yes     Types: Inhaled     Comment: Marijuana oil (smokes) about once a week.     Sexual activity: Not on file   Other Topics Concern    Not on file   Social History Narrative    Not on file     Social Determinants of Health     Financial Resource Strain: Not on file   Food  "Insecurity: Not on file   Transportation Needs: Not on file   Physical Activity: Not on file   Stress: Not on file   Social Connections: Not on file   Intimate Partner Violence: Not on file   Housing Stability: Not on file       PRIOR WORK-UP TO DATE:  Refer to Dr. Charles prior outpatient notes for these details    PHYSICAL EXAM:   /62   Pulse 71   Temp 36.4 °C (97.5 °F) (Temporal)   Resp 18   Ht 1.753 m (5' 9\")   Wt 80.5 kg (177 lb 7.5 oz)   SpO2 94%     Physical Exam  Constitutional:       Appearance: She is well-developed.   HENT:      Head: Normocephalic and atraumatic.      Nose: Nose normal.   Eyes:      Pupils: Pupils are equal, round, and reactive to light.   Cardiovascular:      Rate and Rhythm: Normal rate and regular rhythm.   Pulmonary:      Effort: Pulmonary effort is normal.   Musculoskeletal:         General: Normal range of motion.      Cervical back: Normal range of motion.   Skin:     General: Skin is warm.   Neurological:      Mental Status: She is alert and oriented to person, place, and time.      Motor: Motor strength is normal.No abnormal muscle tone.   Psychiatric:         Mood and Affect: Mood is depressed.        NEUROLOGICAL EXAM:   Neurological Exam  Mental Status  Alert. Oriented to person, place, and time. Language is fluent with no aphasia.    Cranial Nerves  CN III, IV, VI: Pupils equal round and reactive to light bilaterally.    Motor   The following abnormal movements were seen: Strength is 5/5 throughout all four extremities.  Head tremor, titubation noted.    Gait    Deferred.       Assessment & Plan  , AND EDUCATION/COUNSELING  This is a 57 y.o. female who presents to the EMU to characterize and localize epileptic activity, identify the degree of epileptic burden, if any, evaluate for unrecognized seizures, and to optimize medications. =  I had an extensive discussion with the patient about the purpose of the admission. Discussed the purpose of provocative maneuvers " such as photic stimulation, hyperventilation, and/or sleep deprivation which may unmask epileptic activity. We discussed that it may be necessary to decrease, or discontinue altogether, any and all anti-seizure medications to achieve the goals of the admission. I explained that this places patient at higher risk for seizures and status epilepticus, a serious life-threatening emergency with the potential for serious injury or death. It is therefore critical to the patient's safety that he/she monitored in the epilepsy monitoring unit for multiple days to mitigate the risk for serious injury or death. Discussed that the EMU and its personnel mitigate risk as there are rescue medications on hand if needed for any prolonged or repetitive seizures; trained nursing staff, EEG technicians, and a board certified epileptologist available 24/7; and continuous EEG and video surveillance being monitoring live by trained personnel at all times.      Discussed the importance of maintaining seizure precautions at all times. Discussed the importance of notifying nursing staff by using the call button for any concerns or needs, especially to assist with ambulation or transtions into and out of bed. Emphasized that the patient is at a higher for falling and potential subsequent injury due to the cumbersome equipment being worn, as well as other potential factors that may impair balance. It is therefore critical that the patient refrain from getting out of bed or walking without staff assistance.     Discussed the importance and rationale for DVT prophylaxis as well.     Finally, counseled the patient on using the push-button should she/he have any events concerning for seizure. Encourage visitors to press the button as well if a seizure or aura is witnessed and the patient is unable to press the button herself/himself.     Patient agreed to the above discussion. All questions/concerns were addressed.    PLAN:  Patient with ongoing  convulsions,less frequent overall the past 12 months she believes although it is not quite clear. We need first to confirm a diagnosis of epilepsy, then quantify/characterize/localize the seizures, and ultimately further optimize her medications.      Nocturnal convulsions occurring with unclear frequency. Formal diagnosis of epilepsy needs ot be made  Events in questions to capture  Nocturnal convulsions   Continuous VEEG monitoring  Vitals and neurological checks as ordered  Telemetry  Routine Admission labs: CBC, CMP, a  Other diagnostics if applicable: NA  HV and PS to be performed on Day 1 of admission and at the discretion of the attending epileptologist on subsequent days  Rescue Ativan Protocol ordered  Sleep deprivation: No  Active antiseizure regimen:  Lamictal 100 mg TID-> DC Lamictal beginning morning of Day 2  Onfi 20 mg QHS    Intractable headache/migraines  -s/p once time dose of triptan, medium dose. Will refrain from given patient any more triptans given her higher risk of cardiovascular/cerebraovascular events which can be brought on by triptan use  -toradol IV prn  -Will order IV fluid bolus, IV Magnesium if needed      Nicotine Dependence  -smoking cessation counseling  -nicotine gum/patches ordered      OTHER ITEMS:  DVT Ppx: Lovenox and/or SCDs  DIET: Regular  Bowel regimen  PRN analgesics available for pain  PRN antiemetics available    CODE STATUS:   FULL CODE    BILLING DOCUMENTATION:     I spent a total of 75 minutes of face-to-face time in this visit. Over 50% of the time of the visit today was spent on counseling and/or coordination of care wtih the patient and/or family, as above in assessment in plan.    Douglas Charles MD  Department of Neurology at [facility]  General Neurologist and Epileptologist  Director of [facility] Level III Comprehensive Epilepsy Program  Professor of Clinical Neurology, [facility].   Phone: [phone]  Fax: [phone]  E-mail: [email or web address]

## 2023-11-07 PROCEDURE — 770020 HCHG ROOM/CARE - TELE (206)

## 2023-11-07 PROCEDURE — 95714 VEEG EA 12-26 HR UNMNTR: CPT | Performed by: STUDENT IN AN ORGANIZED HEALTH CARE EDUCATION/TRAINING PROGRAM

## 2023-11-07 PROCEDURE — 99418 PROLNG IP/OBS E/M EA 15 MIN: CPT | Performed by: STUDENT IN AN ORGANIZED HEALTH CARE EDUCATION/TRAINING PROGRAM

## 2023-11-07 PROCEDURE — A9270 NON-COVERED ITEM OR SERVICE: HCPCS | Performed by: STUDENT IN AN ORGANIZED HEALTH CARE EDUCATION/TRAINING PROGRAM

## 2023-11-07 PROCEDURE — 700111 HCHG RX REV CODE 636 W/ 250 OVERRIDE (IP): Mod: JZ | Performed by: STUDENT IN AN ORGANIZED HEALTH CARE EDUCATION/TRAINING PROGRAM

## 2023-11-07 PROCEDURE — 700102 HCHG RX REV CODE 250 W/ 637 OVERRIDE(OP): Performed by: STUDENT IN AN ORGANIZED HEALTH CARE EDUCATION/TRAINING PROGRAM

## 2023-11-07 PROCEDURE — 95720 EEG PHY/QHP EA INCR W/VEEG: CPT | Performed by: STUDENT IN AN ORGANIZED HEALTH CARE EDUCATION/TRAINING PROGRAM

## 2023-11-07 PROCEDURE — 4A10X4Z MONITORING OF CENTRAL NERVOUS ELECTRICAL ACTIVITY, EXTERNAL APPROACH: ICD-10-PCS | Performed by: STUDENT IN AN ORGANIZED HEALTH CARE EDUCATION/TRAINING PROGRAM

## 2023-11-07 PROCEDURE — 99233 SBSQ HOSP IP/OBS HIGH 50: CPT | Mod: 25 | Performed by: STUDENT IN AN ORGANIZED HEALTH CARE EDUCATION/TRAINING PROGRAM

## 2023-11-07 RX ADMIN — ENOXAPARIN SODIUM 40 MG: 100 INJECTION SUBCUTANEOUS at 17:36

## 2023-11-07 RX ADMIN — KETOROLAC TROMETHAMINE 30 MG: 30 INJECTION, SOLUTION INTRAMUSCULAR; INTRAVENOUS at 09:31

## 2023-11-07 RX ADMIN — CLOBAZAM 20 MG: 10 TABLET ORAL at 21:58

## 2023-11-07 RX ADMIN — DOCUSATE SODIUM 50 MG AND SENNOSIDES 8.6 MG 2 TABLET: 8.6; 5 TABLET, FILM COATED ORAL at 04:33

## 2023-11-07 ASSESSMENT — COGNITIVE AND FUNCTIONAL STATUS - GENERAL
TOILETING: A LITTLE
DAILY ACTIVITIY SCORE: 23
SUGGESTED CMS G CODE MODIFIER DAILY ACTIVITY: CI
SUGGESTED CMS G CODE MODIFIER MOBILITY: CH
MOBILITY SCORE: 24

## 2023-11-07 ASSESSMENT — PAIN DESCRIPTION - PAIN TYPE: TYPE: ACUTE PAIN

## 2023-11-07 ASSESSMENT — FIBROSIS 4 INDEX: FIB4 SCORE: 1.39

## 2023-11-07 NOTE — CARE PLAN
The patient is Stable - Low risk of patient condition declining or worsening    Shift Goals  Clinical Goals: monitor for seizures, safety, neuro assessments  Patient Goals: capture seizures  Family Goals: ROSEANNE    Progress made toward(s) clinical / shift goals:  Neurological assessments have remained stable throughout this shift and without new deficits. Safety has been maintained through seizure precautions and continuous monitoring.     Patient is progressing towards the following goals:    Problem: Knowledge Deficit - Standard  Goal: Patient and family/care givers will demonstrate understanding of plan of care, disease process/condition, diagnostic tests and medications  Outcome: Progressing  Note: Patient was educated on her medications and the care plan throughout the night.      Problem: Medication  Goal: Risk for seizure medication side effects will decrease  Outcome: Progressing  Note: Patient has been weaned off medications during this shift and has been resting in bed without any seizures.

## 2023-11-07 NOTE — PROGRESS NOTES
Monitor summary: SR 60-73, ID [id number], QRS 0.10, QT 0.38, with rare PVCs per strip from monitor room.

## 2023-11-07 NOTE — PROCEDURES
VIDEO ELECTROENCEPHALOGRAM REPORT - EPILEPSY MONITORING UNIT (EMU)     REFERRING PROVIDER: Dr. Charles  DOS: 11/7/2023  ROOM: [facility]/   TOTAL RECORDING TIME: 23 hours and 31 minutes of total recording time    INDICATION:  Sarita Loera 57 y.o. female presenting with seizure(s) and unspecified convulsions    RELEVANT MEDICATIONS/TREATMENTS:   Onfi 20 mg QHS      TECHNIQUE:   CVEEG was set up by a Neurodiagnostic technologist who performed education to the patient and staff. A minimum of 23 electrodes and 23 channel recording was setup and performed by Neurodiagnostic technologist, in accordance with the international 10-20 system. Impedence, electrode integrity, and technical impressions were documented a minimum of every 2-24 hour period by a Neurodiagnostic Technologist and reviewed by Interpreting Physician. The study was reviewed in bipolar and referential montages. The recording examined the patient in the awake and drowsy/sleep state(s).     DESCRIPTION OF THE RECORD:  During wakefulness, the background was continuous and showed a 10 Hz posterior dominant rhythm.  There was reactivity to eye closure/opening.  A normal anterior-posterior gradient was noted with faster beta frequencies seen anteriorly.  During drowsiness, theta/delta frequencies were seen.    Sleep was captured and was characterized by diffuse background delta/theta activity with a loss of myogenic artifact.  N2 sleep transients in the form of sleep spindles and vertex waves were seen in the leads over the central regions.     ICTAL AND INTERICTAL FINDINGS:   Occasional sharply contoured right or left temporal transients, suspicious but not definitively epileptiform. Ultimately, No definitive focal or generalized epileptiform activity noted.    During drowsiness and sleep, the emergency of intermittent right>left inferior posterior temporal-parietal sharply contoured theta/delta slowing, rarely quasi-rhythmic at 2.5-3.5 Hz. . This finding,  however, may be artifact.     No seizures    ACTIVATION PROCEDURES:   NA    EKG: Sampling of the EKG recording showed abnormal QRS and ST segment changes.    EVENTS:  None    INTERPRETATION:  Potentially mildly abnormal video EEG recording in the awake and drowsy/sleep state(s):  - During drowsiness and sleep, the emergency of intermittent right>left inferior posterior temporal-parietal sharply contoured theta/delta slowing, rarely quasi-rhythmic at 2.5-3.5 Hz. . This finding, however, may be artifact.   - No definitive epileptiform discharges were seen.  - No definitive seizures. Clinical correlation is recommended.  -No clinical events  -Compared to the prior study, no major differences were seen      Douglas Charles MD  Department of Neurology at [facility]  General Neurologist and Epileptologist  Director of [facility]'s Level III Comprehensive Epilepsy Program  Professor of Clinical Neurology, [facility].   Phone: [phone]  Fax: [phone]  E-mail: [email or web address]

## 2023-11-08 PROCEDURE — 700111 HCHG RX REV CODE 636 W/ 250 OVERRIDE (IP): Mod: JZ | Performed by: STUDENT IN AN ORGANIZED HEALTH CARE EDUCATION/TRAINING PROGRAM

## 2023-11-08 PROCEDURE — 700102 HCHG RX REV CODE 250 W/ 637 OVERRIDE(OP): Performed by: STUDENT IN AN ORGANIZED HEALTH CARE EDUCATION/TRAINING PROGRAM

## 2023-11-08 PROCEDURE — 770020 HCHG ROOM/CARE - TELE (206)

## 2023-11-08 PROCEDURE — 95720 EEG PHY/QHP EA INCR W/VEEG: CPT | Performed by: STUDENT IN AN ORGANIZED HEALTH CARE EDUCATION/TRAINING PROGRAM

## 2023-11-08 PROCEDURE — 4A10X4Z MONITORING OF CENTRAL NERVOUS ELECTRICAL ACTIVITY, EXTERNAL APPROACH: ICD-10-PCS | Performed by: STUDENT IN AN ORGANIZED HEALTH CARE EDUCATION/TRAINING PROGRAM

## 2023-11-08 PROCEDURE — 99418 PROLNG IP/OBS E/M EA 15 MIN: CPT | Performed by: STUDENT IN AN ORGANIZED HEALTH CARE EDUCATION/TRAINING PROGRAM

## 2023-11-08 PROCEDURE — 99233 SBSQ HOSP IP/OBS HIGH 50: CPT | Mod: 25 | Performed by: STUDENT IN AN ORGANIZED HEALTH CARE EDUCATION/TRAINING PROGRAM

## 2023-11-08 PROCEDURE — A9270 NON-COVERED ITEM OR SERVICE: HCPCS | Performed by: STUDENT IN AN ORGANIZED HEALTH CARE EDUCATION/TRAINING PROGRAM

## 2023-11-08 RX ORDER — HYDROXYZINE HYDROCHLORIDE 25 MG/1
50 TABLET, FILM COATED ORAL ONCE
Status: COMPLETED | OUTPATIENT
Start: 2023-11-08 | End: 2023-11-08

## 2023-11-08 RX ORDER — TOPIRAMATE 25 MG/1
50 TABLET ORAL EVERY 12 HOURS
Status: DISCONTINUED | OUTPATIENT
Start: 2023-11-08 | End: 2023-11-09

## 2023-11-08 RX ORDER — KETOROLAC TROMETHAMINE 30 MG/ML
30 INJECTION, SOLUTION INTRAMUSCULAR; INTRAVENOUS ONCE
Status: COMPLETED | OUTPATIENT
Start: 2023-11-08 | End: 2023-11-08

## 2023-11-08 RX ADMIN — KETOROLAC TROMETHAMINE 30 MG: 30 INJECTION, SOLUTION INTRAMUSCULAR; INTRAVENOUS at 15:14

## 2023-11-08 RX ADMIN — TOPIRAMATE 50 MG: 25 TABLET, FILM COATED ORAL at 15:14

## 2023-11-08 RX ADMIN — HYDROXYZINE HYDROCHLORIDE 50 MG: 25 TABLET, FILM COATED ORAL at 15:14

## 2023-11-08 RX ADMIN — ENOXAPARIN SODIUM 40 MG: 100 INJECTION SUBCUTANEOUS at 18:48

## 2023-11-08 RX ADMIN — CLOBAZAM 20 MG: 10 TABLET ORAL at 21:08

## 2023-11-08 ASSESSMENT — PAIN DESCRIPTION - PAIN TYPE: TYPE: ACUTE PAIN

## 2023-11-08 NOTE — PROGRESS NOTES
"NEUROLOGY EMU DAILY PROGRESS NOTE 11/7/2023     REFERRING PROVIDER: Dr. Charles    REASON FOR ADMISSION: Reason for EMU Admission: Medication titration/optimization, Determine degree of epileptic burden, if any, Determine presence of unrecognized and/or elecrographic seizures, Seizure localization and lateralization, and Control seizure     INTERVAL HISTORY:      No clinical events. EEG unremarkable, without seizures or any epileptiform phenomena. Questionable right posterior infeerior slowing vs artifact.     Patient reports worsening migraines. Will escalate analgesics antimigraine cocktail if need be but I would recommend refraining from triptans until cardiovascular health can be better evaluated    Stopping Lamictal starting today. Will continue Onfi QHS    Labs reviewed        PHYSICAL EXAM:   /67   Pulse (!) 56   Temp 36 °C (96.8 °F) (Temporal)   Resp 17   Ht 1.753 m (5' 9\")   Wt 84.7 kg (186 lb 11.7 oz)   SpO2 92%     Physical Exam  Constitutional:       Appearance: She is well-developed.   HENT:      Head: Normocephalic and atraumatic.      Nose: Nose normal.   Eyes:      Pupils: Pupils are equal, round, and reactive to light.   Cardiovascular:      Rate and Rhythm: Normal rate and regular rhythm.   Pulmonary:      Effort: Pulmonary effort is normal.   Musculoskeletal:         General: Normal range of motion.      Cervical back: Normal range of motion.   Skin:     General: Skin is warm.   Neurological:      Mental Status: She is alert and oriented to person, place, and time.      Motor: Motor strength is normal.No abnormal muscle tone.   Psychiatric:         Mood and Affect: Mood is depressed.        NEUROLOGICAL EXAM:   Neurological Exam  Mental Status  Alert. Oriented to person, place, and time. Language is fluent with no aphasia.    Cranial Nerves  CN III, IV, VI: Pupils equal round and reactive to light bilaterally.    Motor   The following abnormal movements were seen: Strength is 5/5 " throughout all four extremities.  Head tremor, titubation noted.    Gait    Deferred.       Assessment & Plan  , AND EDUCATION/COUNSELING  This is a 57 y.o. female who presents to the EMU to characterize and localize epileptic activity, identify the degree of epileptic burden, if any, evaluate for unrecognized seizures, and to optimize medications. =  I had an extensive discussion with the patient about the purpose of the admission. Discussed the purpose of provocative maneuvers such as photic stimulation, hyperventilation, and/or sleep deprivation which may unmask epileptic activity. We discussed that it may be necessary to decrease, or discontinue altogether, any and all anti-seizure medications to achieve the goals of the admission. I explained that this places patient at higher risk for seizures and status epilepticus, a serious life-threatening emergency with the potential for serious injury or death. It is therefore critical to the patient's safety that he/she monitored in the epilepsy monitoring unit for multiple days to mitigate the risk for serious injury or death. Discussed that the EMU and its personnel mitigate risk as there are rescue medications on hand if needed for any prolonged or repetitive seizures; trained nursing staff, EEG technicians, and a board certified epileptologist available 24/7; and continuous EEG and video surveillance being monitoring live by trained personnel at all times.      Discussed the importance of maintaining seizure precautions at all times. Discussed the importance of notifying nursing staff by using the call button for any concerns or needs, especially to assist with ambulation or transtions into and out of bed. Emphasized that the patient is at a higher for falling and potential subsequent injury due to the cumbersome equipment being worn, as well as other potential factors that may impair balance. It is therefore critical that the patient refrain from getting out of bed  or walking without staff assistance.     Discussed the importance and rationale for DVT prophylaxis as well.     Finally, counseled the patient on using the push-button should she/he have any events concerning for seizure. Encourage visitors to press the button as well if a seizure or aura is witnessed and the patient is unable to press the button herself/himself.     Patient agreed to the above discussion. All questions/concerns were addressed.    PLAN:  No clinical events captured so far. NO epileptiform abnormalities. No seizures. Will stop Lamictal and continue Onfi QHS for now. Continue monitoring. Patient will be sleep deprived tonight. She has worsening migraines. Will escalate treatment if needed. No more triptans for reasons mentioned above      Nocturnal convulsions occurring with unclear frequency. Formal diagnosis of epilepsy needs ot be made  Events in questions to capture  Nocturnal convulsions   Continuous VEEG monitoring  Vitals and neurological checks as ordered  Telemetry  Routine Admission labs: CBC, CMP, a  Other diagnostics if applicable: NA  HV and PS to be performed on Day 1 of admission and at the discretion of the attending epileptologist on subsequent days  Rescue Ativan Protocol ordered  Sleep deprivation: YES -  Day 2 only  Active antiseizure regimen:  Lamictal 100 mg TID-> DC'ed Lamictal beginning morning of Day 2  Onfi 20 mg QHS    Intractable headache/migraines  -s/p once time dose of triptan, medium dose on Day. Will refrain from given patient any more triptans given her higher risk of cardiovascular/cerebraovascular events which can be brought on by triptan use  -toradol IV prn  -IV fluid bolus, IV Magnesium if needed  -consider zonisamide or topiramate in place of lamictal      Nicotine Dependence  -smoking cessation counseling  -nicotine gum/patches ordered      OTHER ITEMS:  DVT Ppx: Lovenox and/or SCDs  DIET: Regular  Bowel regimen  PRN analgesics available for pain  PRN  antiemetics available    CODE STATUS:   FULL CODE    BILLING DOCUMENTATION:     I spent a total of 65 minutes of face-to-face time in this visit. Over 50% of the time of the visit today was spent on counseling and/or coordination of care wtih the patient and/or family, as above in assessment in plan.    Douglas Charles MD  Department of Neurology at [facility]  General Neurologist and Epileptologist  Director of [facility]'s Level III Comprehensive Epilepsy Program  Professor of Clinical Neurology, [facility].   Phone: [phone]  Fax: [phone]  E-mail: [email or web address]

## 2023-11-08 NOTE — PROCEDURES
VIDEO ELECTROENCEPHALOGRAM REPORT - EPILEPSY MONITORING UNIT (EMU)     REFERRING PROVIDER: Dr. Charles  DOS: 11/8/2023  ROOM: [place] [address]   TOTAL RECORDING TIME: 23 hours and 48 minutes of total recording time (although only about 15 hour and 48 minutes of readable recording was obtained)    INDICATION:  Sarita Loera 57 y.o. female presenting with seizure(s) and unspecified convulsions    RELEVANT MEDICATIONS/TREATMENTS:   Onfi 20 mg QHS  Topamax 50 mg QHS (NEW)    TECHNIQUE:   CVEEG was set up by a Neurodiagnostic technologist who performed education to the patient and staff. A minimum of 23 electrodes and 23 channel recording was setup and performed by Neurodiagnostic technologist, in accordance with the international 10-20 system. Impedence, electrode integrity, and technical impressions were documented a minimum of every 2-24 hour period by a Neurodiagnostic Technologist and reviewed by Interpreting Physician. The study was reviewed in bipolar and referential montages. The recording examined the patient in the awake and drowsy/sleep state(s).     DESCRIPTION OF THE RECORD:  During wakefulness, the background was continuous and showed a 10 Hz posterior dominant rhythm.  There was reactivity to eye closure/opening.  A normal anterior-posterior gradient was noted with faster beta frequencies seen anteriorly.  During drowsiness, theta/delta frequencies were seen.    Sleep was captured and was characterized by diffuse background delta/theta activity with a loss of myogenic artifact.  N2 sleep transients in the form of sleep spindles and vertex waves were seen in the leads over the central regions.     ICTAL AND INTERICTAL FINDINGS:   Occasional sharply contoured right or left temporal transients, suspicious but not definitively epileptiform. Ultimately, No definitive focal or generalized epileptiform activity noted.    During drowsiness and sleep, the emergency of intermittent right>left inferior posterior  temporal-parietal sharply contoured theta/delta slowing, rarely quasi-rhythmic at 2.5-3.5 Hz. . This finding, however, may be artifact.     One clinical event occurred at 11:45 PM and lasted a few minutes.  Unfortunately from a combination of generalized 60 Hz artifact and issues with connectivity the background EEG during the clinical event was largely unreadable/uninterpretable.  Clinically, patient appeared to wake up appear anxious, right hand started shaking/trembling on and off in a semirhythmic fashion.  She reaches for a cup with the right hand outstretched, but does not grab the cup, her hand extended and still trembling.  Patient motors something but it is not discernible.  Her face appears contorted, right arm stiffening and posturing abnormally, becomes tearful.  My nurses into the room and examined the patient.  She is able to track them and seems to follow commands shortly after they enter.    ACTIVATION PROCEDURES:   NA    EKG: Sampling of the EKG recording showed abnormal QRS and ST segment changes.    EVENTS:  None    NOTE: This is a technically-limited study due to 60 Hz artifac'st and intermittent connectivity issues after 11 PM, making most of the recording in the last 8 hour largely uninterpretable.     INTERPRETATION:  Potentially abnormal, although very technically-limited video EEG recording in the awake and drowsy/sleep state(s):  - During drowsiness and sleep, the emergency of intermittent right>left inferior posterior temporal-parietal sharply contoured theta/delta slowing, rarely quasi-rhythmic at 2.5-3.5 Hz.This finding, however, may be artifact.   -One clinical event occurred at 11:45 PM and lasted a few minutes.  Unfortunately from a combination of generalized 60 Hz artifact and issues with connectivity the background EEG during the clinical event was largely unreadable/uninterpretable.  Clinically, patient appeared to wake up appear anxious, right hand started shaking/trembling on and  off in a semirhythmic fashion.  She reaches for a cup with the right hand outstretched, but does not grab the cup, her hand extended and still trembling.  Patient motors something but it is not discernible.  Her face appears contorted, right arm stiffening and posturing abnormally, becomes tearful.  My nurses into the room and examined the patient.  She is able to track them and seems to follow commands shortly after they enter. Cannot conclusively say this was a seizure based on its clinical features alone, but a seizure remains a possibility        Douglas Charles MD  Department of Neurology at [facility]  General Neurologist and Epileptologist  Director of [facility] Level III Comprehensive Epilepsy Program  Professor of Clinical Neurology, [facility].   Phone: [phone]  Fax: [phone]  E-mail: [email or web address]

## 2023-11-08 NOTE — CARE PLAN
The patient is Stable - Low risk of patient condition declining or worsening    Shift Goals  Clinical Goals: monitor for seizures, safety, neuro assessments  Patient Goals: capture seizures  Family Goals: ROSEANNE    Problem: Knowledge Deficit - Standard  Goal: Patient and family/care givers will demonstrate understanding of plan of care, disease process/condition, diagnostic tests and medications  Description: Target End Date:  1-3 days or as soon as patient condition allows    Document in Patient Education    1.  Patient and family/caregiver oriented to unit, equipment, visitation policy and means for communicating concern  2.  Complete/review Learning Assessment  3.  Assess knowledge level of disease process/condition, treatment plan, diagnostic tests and medications  4.  Explain disease process/condition, treatment plan, diagnostic tests and medications  Outcome: Progressing     Problem: Seizure Precautions  Goal: Implementation of seizure precautions  Description: Target End Date:  Prior to discharge or change in level of care    1.  Padded side rails up at all times  2.  Suction equipment and oxygen delivery system at bedside  3.  Continuous pulse oximeter in use  4.  Implement fall precautions, bed alarm on, bed in lowest position  5.  IV access (per order)  6.  Provide low stimulus environment, avoid exposure to triggers  7.  Instruct patient to use call light/seizure button if having warning signs of impending seizure  Outcome: Progressing  Note: Seizure precautions in place.

## 2023-11-08 NOTE — WOUND TEAM
Wound team attempted to assess patient's sacrum for possible non-blanching bug bite.  Per patient request she wanted to sleep.  Wound team to re-assess at a later time.  Discussed with bedside RN to ensure offloading interventions in place.

## 2023-11-08 NOTE — PROGRESS NOTES
Monitor summary: SB-SR, HR 55-61, NH .16, QRS .10, QT .44 with r pvc per strip from monitor room

## 2023-11-08 NOTE — PROGRESS NOTES
Monitor summary: SB/SR 50-61, FL 0.17, QRS 0.08, QT 0.42, with rare PAC per strip from monitor room.

## 2023-11-08 NOTE — CARE PLAN
The patient is Stable - Low risk of patient condition declining or worsening    Shift Goals  Clinical Goals: monitor for seizures, safety, neuro assessments  Patient Goals: capture seizures, rest  Family Goals: ROSEANNE    Progress made toward(s) clinical / shift goals:  Patient gets up with staff and uses the call light appropriately. Neuro assessments have remained stable without new deficits. Rest was difficult to achieve.     Patient is progressing towards the following goals:    Problem: Physical Regulation  Goal: Decrease in complications related to the disease process, condition or treatment  Outcome: Progressing     Problem: Knowledge Deficit - Seizures  Goal: Knowledge of seizure treatment regimen will improve  Outcome: Progressing

## 2023-11-09 LAB
ALBUMIN SERPL BCP-MCNC: 4.6 G/DL (ref 3.2–4.9)
ALBUMIN/GLOB SERPL: 1.7 G/DL
ALP SERPL-CCNC: 103 U/L (ref 30–99)
ALT SERPL-CCNC: 22 U/L (ref 2–50)
ANION GAP SERPL CALC-SCNC: 13 MMOL/L (ref 7–16)
AST SERPL-CCNC: 24 U/L (ref 12–45)
BILIRUB SERPL-MCNC: 0.4 MG/DL (ref 0.1–1.5)
BUN SERPL-MCNC: 27 MG/DL (ref 8–22)
CALCIUM ALBUM COR SERPL-MCNC: 9.1 MG/DL (ref 8.5–10.5)
CALCIUM SERPL-MCNC: 9.6 MG/DL (ref 8.5–10.5)
CHLORIDE SERPL-SCNC: 103 MMOL/L (ref 96–112)
CO2 SERPL-SCNC: 23 MMOL/L (ref 20–33)
CREAT SERPL-MCNC: 0.65 MG/DL (ref 0.5–1.4)
EKG IMPRESSION: NORMAL
GFR SERPLBLD CREATININE-BSD FMLA CKD-EPI: 101 ML/MIN/1.73 M 2
GLOBULIN SER CALC-MCNC: 2.7 G/DL (ref 1.9–3.5)
GLUCOSE SERPL-MCNC: 86 MG/DL (ref 65–99)
MAGNESIUM SERPL-MCNC: 2.1 MG/DL (ref 1.5–2.5)
POTASSIUM SERPL-SCNC: 3.9 MMOL/L (ref 3.6–5.5)
PROT SERPL-MCNC: 7.3 G/DL (ref 6–8.2)
SODIUM SERPL-SCNC: 139 MMOL/L (ref 135–145)
T3FREE SERPL-MCNC: 2.83 PG/ML (ref 2–4.4)
T4 FREE SERPL-MCNC: 1.06 NG/DL (ref 0.93–1.7)
TROPONIN T SERPL-MCNC: <6 NG/L (ref 6–19)
TSH SERPL DL<=0.005 MIU/L-ACNC: 1 UIU/ML (ref 0.38–5.33)

## 2023-11-09 PROCEDURE — 80053 COMPREHEN METABOLIC PANEL: CPT

## 2023-11-09 PROCEDURE — 770020 HCHG ROOM/CARE - TELE (206)

## 2023-11-09 PROCEDURE — 99233 SBSQ HOSP IP/OBS HIGH 50: CPT | Mod: 25 | Performed by: STUDENT IN AN ORGANIZED HEALTH CARE EDUCATION/TRAINING PROGRAM

## 2023-11-09 PROCEDURE — 93010 ELECTROCARDIOGRAM REPORT: CPT | Performed by: INTERNAL MEDICINE

## 2023-11-09 PROCEDURE — A9270 NON-COVERED ITEM OR SERVICE: HCPCS | Performed by: STUDENT IN AN ORGANIZED HEALTH CARE EDUCATION/TRAINING PROGRAM

## 2023-11-09 PROCEDURE — 700111 HCHG RX REV CODE 636 W/ 250 OVERRIDE (IP): Performed by: STUDENT IN AN ORGANIZED HEALTH CARE EDUCATION/TRAINING PROGRAM

## 2023-11-09 PROCEDURE — 84439 ASSAY OF FREE THYROXINE: CPT

## 2023-11-09 PROCEDURE — 84484 ASSAY OF TROPONIN QUANT: CPT

## 2023-11-09 PROCEDURE — 83735 ASSAY OF MAGNESIUM: CPT

## 2023-11-09 PROCEDURE — 97602 WOUND(S) CARE NON-SELECTIVE: CPT

## 2023-11-09 PROCEDURE — 93005 ELECTROCARDIOGRAM TRACING: CPT | Performed by: STUDENT IN AN ORGANIZED HEALTH CARE EDUCATION/TRAINING PROGRAM

## 2023-11-09 PROCEDURE — 84443 ASSAY THYROID STIM HORMONE: CPT

## 2023-11-09 PROCEDURE — 84481 FREE ASSAY (FT-3): CPT

## 2023-11-09 PROCEDURE — 4A10X4Z MONITORING OF CENTRAL NERVOUS ELECTRICAL ACTIVITY, EXTERNAL APPROACH: ICD-10-PCS | Performed by: STUDENT IN AN ORGANIZED HEALTH CARE EDUCATION/TRAINING PROGRAM

## 2023-11-09 PROCEDURE — 99418 PROLNG IP/OBS E/M EA 15 MIN: CPT | Performed by: STUDENT IN AN ORGANIZED HEALTH CARE EDUCATION/TRAINING PROGRAM

## 2023-11-09 PROCEDURE — 700102 HCHG RX REV CODE 250 W/ 637 OVERRIDE(OP): Performed by: STUDENT IN AN ORGANIZED HEALTH CARE EDUCATION/TRAINING PROGRAM

## 2023-11-09 PROCEDURE — 95720 EEG PHY/QHP EA INCR W/VEEG: CPT | Performed by: STUDENT IN AN ORGANIZED HEALTH CARE EDUCATION/TRAINING PROGRAM

## 2023-11-09 PROCEDURE — 95714 VEEG EA 12-26 HR UNMNTR: CPT | Performed by: STUDENT IN AN ORGANIZED HEALTH CARE EDUCATION/TRAINING PROGRAM

## 2023-11-09 RX ORDER — TOPIRAMATE 25 MG/1
50 TABLET ORAL ONCE
Status: COMPLETED | OUTPATIENT
Start: 2023-11-09 | End: 2023-11-09

## 2023-11-09 RX ORDER — TOPIRAMATE 25 MG/1
50 TABLET ORAL EVERY MORNING
Status: DISCONTINUED | OUTPATIENT
Start: 2023-11-10 | End: 2023-11-10 | Stop reason: HOSPADM

## 2023-11-09 RX ORDER — CLOBAZAM 10 MG/1
25 TABLET ORAL NIGHTLY
Status: DISCONTINUED | OUTPATIENT
Start: 2023-11-09 | End: 2023-11-10 | Stop reason: HOSPADM

## 2023-11-09 RX ORDER — LORAZEPAM 2 MG/ML
2 INJECTION INTRAMUSCULAR ONCE
Status: COMPLETED | OUTPATIENT
Start: 2023-11-09 | End: 2023-11-09

## 2023-11-09 RX ORDER — TOPIRAMATE 100 MG/1
100 TABLET, FILM COATED ORAL EVERY EVENING
Status: DISCONTINUED | OUTPATIENT
Start: 2023-11-10 | End: 2023-11-10 | Stop reason: HOSPADM

## 2023-11-09 RX ADMIN — TOPIRAMATE 50 MG: 25 TABLET, FILM COATED ORAL at 21:21

## 2023-11-09 RX ADMIN — LORAZEPAM 2 MG: 2 INJECTION INTRAMUSCULAR; INTRAVENOUS at 10:11

## 2023-11-09 RX ADMIN — TOPIRAMATE 50 MG: 25 TABLET, FILM COATED ORAL at 16:34

## 2023-11-09 RX ADMIN — CLOBAZAM 25 MG: 10 TABLET ORAL at 21:21

## 2023-11-09 RX ADMIN — ENOXAPARIN SODIUM 40 MG: 100 INJECTION SUBCUTANEOUS at 16:34

## 2023-11-09 RX ADMIN — TOPIRAMATE 50 MG: 25 TABLET, FILM COATED ORAL at 05:02

## 2023-11-09 ASSESSMENT — PAIN DESCRIPTION - PAIN TYPE
TYPE: ACUTE PAIN
TYPE: ACUTE PAIN

## 2023-11-09 ASSESSMENT — FIBROSIS 4 INDEX: FIB4 SCORE: 1.39

## 2023-11-09 NOTE — CODE DOCUMENTATION
Patient reports sharp sudden onset chest pain after reaching for bedside table. The pain persisted after returning to a resting position in the bed, and the patient reports that the pain radiates down her left arm and results in numbness to the left hand. The pain then slowly dissapated moving back up her arm towards her chest.  5 minutes later, the patient reports that she is no longer experiencing any pain.

## 2023-11-09 NOTE — CARE PLAN
The patient is Stable - Low risk of patient condition declining or worsening    Shift Goals  Clinical Goals: monitor for seizures, safety, neuro assessments  Patient Goals: capture seizures, rest  Family Goals: ROSEANNE      Problem: Knowledge Deficit - Standard  Goal: Patient and family/care givers will demonstrate understanding of plan of care, disease process/condition, diagnostic tests and medications  Description: Target End Date:  1-3 days or as soon as patient condition allows    Document in Patient Education    1.  Patient and family/caregiver oriented to unit, equipment, visitation policy and means for communicating concern  2.  Complete/review Learning Assessment  3.  Assess knowledge level of disease process/condition, treatment plan, diagnostic tests and medications  4.  Explain disease process/condition, treatment plan, diagnostic tests and medications  Outcome: Progressing  Note: Patient updated on the plan of care.  Pt verbalized understanding of the plan.      Problem: Medication  Goal: Risk for seizure medication side effects will decrease  Description: Target End Date:  Prior to discharge or change in level of care    1.  Monitor for potential side effects of medications  2.  Monitor for signs and symptoms of medication toxicity  3.  Monitor lab values for medications for toxicity and subtherapeutic levels  Outcome: Progressing  Note: No seizures in hospital as of now.

## 2023-11-09 NOTE — PROGRESS NOTES
Pt complaining of chest pain. Rapid response called. Vitals taken. Pt states it is possibly her VNS.

## 2023-11-09 NOTE — PROGRESS NOTES
Pt c/o Chest pain and numbness radiating down her left arm. Dr. Charles notified. Orders placed for EKG and trops.  Rapid team arrived. After a few minutes pain subsided. EKG was negative. Trops pending.   Dr. Charles updated.

## 2023-11-09 NOTE — PROGRESS NOTES
Monitor Summary: SB/SR 44-60, IL 0.15, QRS 0.09, QT 0.45, with rare PVCs per strip from monitor room.

## 2023-11-09 NOTE — PROGRESS NOTES
"NEUROLOGY EMU DAILY PROGRESS NOTE 11/8/2023     REFERRING PROVIDER: Dr. Charles    REASON FOR ADMISSION: Reason for EMU Admission: Medication titration/optimization, Determine degree of epileptic burden, if any, Determine presence of unrecognized and/or elecrographic seizures, Seizure localization and lateralization, and Control seizure     INTERVAL HISTORY:      Patient with ongoing headache pain, made worse by insomnia. Pain and insomnia have also left her emotionally labile which is understandable. She remains on Onfi 20 mg QHS.    Clarified use of Lamictal. She has been on it for 20 + years and she felt that it never had any meaningful benefit in regards to seizure control.    Discussed adding topamax which would help treat headaches, control seizures, and assist and niuctoine cravings. She is willing.    Toradol IV and hydroxyzine were given to help her sleep.    EEG remains unchanged - unremarkable without epileptic activity.     Patient notably bradycardic in the mid 40s-50s.      PHYSICAL EXAM:   /64   Pulse (!) 52   Temp 36.5 °C (97.7 °F) (Temporal)   Resp 17   Ht 1.753 m (5' 9\")   Wt 84.7 kg (186 lb 11.7 oz)   SpO2 92%     Physical Exam  Constitutional:       Appearance: She is well-developed.   HENT:      Head: Normocephalic and atraumatic.      Nose: Nose normal.   Eyes:      Pupils: Pupils are equal, round, and reactive to light.   Cardiovascular:      Rate and Rhythm: Normal rate and regular rhythm.   Pulmonary:      Effort: Pulmonary effort is normal.   Musculoskeletal:         General: Normal range of motion.      Cervical back: Normal range of motion.   Skin:     General: Skin is warm.   Neurological:      Mental Status: She is alert and oriented to person, place, and time.      Motor: Motor strength is normal.No abnormal muscle tone.   Psychiatric:         Mood and Affect: Mood is depressed.        NEUROLOGICAL EXAM:   Neurological Exam  Mental Status  Alert. Oriented to person, place, " and time. Language is fluent with no aphasia.    Cranial Nerves  CN III, IV, VI: Pupils equal round and reactive to light bilaterally.    Motor   The following abnormal movements were seen: Strength is 5/5 throughout all four extremities.  Head tremor, titubation noted.    Gait    Deferred.       Assessment & Plan  , AND EDUCATION/COUNSELING  This is a 57 y.o. female who presents to the EMU to characterize and localize epileptic activity, identify the degree of epileptic burden, if any, evaluate for unrecognized seizures, and to optimize medications. =  I had an extensive discussion with the patient about the purpose of the admission. Discussed the purpose of provocative maneuvers such as photic stimulation, hyperventilation, and/or sleep deprivation which may unmask epileptic activity. We discussed that it may be necessary to decrease, or discontinue altogether, any and all anti-seizure medications to achieve the goals of the admission. I explained that this places patient at higher risk for seizures and status epilepticus, a serious life-threatening emergency with the potential for serious injury or death. It is therefore critical to the patient's safety that he/she monitored in the epilepsy monitoring unit for multiple days to mitigate the risk for serious injury or death. Discussed that the EMU and its personnel mitigate risk as there are rescue medications on hand if needed for any prolonged or repetitive seizures; trained nursing staff, EEG technicians, and a board certified epileptologist available 24/7; and continuous EEG and video surveillance being monitoring live by trained personnel at all times.      Discussed the importance of maintaining seizure precautions at all times. Discussed the importance of notifying nursing staff by using the call button for any concerns or needs, especially to assist with ambulation or transtions into and out of bed. Emphasized that the patient is at a higher for falling and  potential subsequent injury due to the cumbersome equipment being worn, as well as other potential factors that may impair balance. It is therefore critical that the patient refrain from getting out of bed or walking without staff assistance.     Discussed the importance and rationale for DVT prophylaxis as well.     Finally, counseled the patient on using the push-button should she/he have any events concerning for seizure. Encourage visitors to press the button as well if a seizure or aura is witnessed and the patient is unable to press the button herself/himself.     Patient agreed to the above discussion. All questions/concerns were addressed.    PLAN:  No clinical events captured so far. NO epileptiform abnormalities. No seizures. Lamictal stopped as of 11/7/23. Continues Onfi QHS for now. Continue monitoring.  No sleep deprivation 11/08/23. Giving IV toradol and hydrpxyzine to help with sleep and pain, respectively.  Adding topamax in place of Lamictal for headache prevention, seizure prevention, and to help with smoking cessation. 50 mg QHS ordered for 11/08/23 evening.  No more triptans for reasons mentioned above      Nocturnal convulsions occurring with unclear frequency. Formal diagnosis of epilepsy needs ot be made  Events in questions to capture  Nocturnal convulsions   Continuous VEEG monitoring  Vitals and neurological checks as ordered  Telemetry  Routine Admission labs: CBC, CMP,   Other diagnostics if applicable: NA  HV and PS to be performed on Day 1 of admission and at the discretion of the attending epileptologist on subsequent days  Rescue Ativan Protocol ordered  Sleep deprivation: No. ALLOW PATIENT UNINTERRUPTED SLEEP  Active antiseizure regimen:  Lamictal 100 mg TID-> DC'ed Lamictal beginning morning of Day 2  Onfi 20 mg QHS  Tomamax 50 mg QHD starting evening of 11/08/23     Intractable headache/migraines  -s/p once time dose of triptan, medium dose on Day. Will refrain from given patient  any more triptans given her higher risk of cardiovascular/cerebraovascular events which can be brought on by triptan use  -toradol IV prn  -IV fluid bolus, IV Magnesium if needed  -topamax added as above for headache prevention      Insomnia  -hydroxyzine added 11/08/23   -address pain    Nicotine Dependence  -smoking cessation counseling  -nicotine gum/patches ordered      OTHER ITEMS:  DVT Ppx: Lovenox and/or SCDs  DIET: Regular  Bowel regimen  PRN analgesics available for pain  PRN antiemetics available    CODE STATUS:   FULL CODE    BILLING DOCUMENTATION:     I spent a total of 70 minutes of face-to-face time in this visit. Over 50% of the time of the visit today was spent on counseling and/or coordination of care wtih the patient and/or family, as above in assessment in plan.    Douglas Charles MD  Department of Neurology at [facility]  General Neurologist and Epileptologist  Director of [facility] Level III Comprehensive Epilepsy Program  Professor of Clinical Neurology, [facility].   Phone: [phone]  Fax: [phone]  E-mail: [email or web address]

## 2023-11-09 NOTE — WOUND TEAM
[facility]  Inpatient Services  Wound and Skin Care Brief Evaluation    Admission Date: 11/6/2023     Last order of IP CONSULT TO WOUND CARE was found on 11/6/2023 from Hospital Encounter on 11/2/2023     HPI, PMH, SH: Reviewed    No chief complaint on file.    Diagnosis: Seizure (HCC) [R56.9]    Unit where seen by Wound Team: S182/02     Wound consult placed regarding buttocks. Chart and images reviewed. This discussed with bedside RN. This clinician in to assess patient. Patient pleasant and agreeable. Small scab to R side of gluteal cleft, patient states area is a bug bite.  Small amount of barrier paste applied to area.    No pressure injuries or advanced wound care needs identified. Wound consult completed. Wound team signing off, re-consult if patient has further advanced wound care needs.         PREVENTATIVE INTERVENTIONS:    Q shift Royce - performed per nursing policy  Q shift pressure point assessments - performed per nursing policy    Surface/Positioning  Standard/trauma mattress - Currently in Place

## 2023-11-09 NOTE — CARE PLAN
The patient is Watcher - Medium risk of patient condition declining or worsening    Shift Goals  Clinical Goals: monitor for seizures, safety, stable neuro assessments  Patient Goals: sleep, capture a seizure  Family Goals: ROSEANNE    Progress made toward(s) clinical / shift goals:  Patient had a seizure that was then followed by an absent seizure for a brief moment. Staff assist was called and the patient was assessed. Neuro assessments have returned to baseline. Physician notified of seizure.     Patient is not progressing towards the following goals:    Problem: Seizure Precautions  Goal: Implementation of seizure precautions  Outcome: Progressing     Problem: Knowledge Deficit - Seizures  Goal: Knowledge of seizure treatment regimen will improve  Outcome: Progressing

## 2023-11-09 NOTE — PROGRESS NOTES
Rapid Response Summary     Rapid response called at 0910 for: Chest Pain     VS: WDL (See Vitals Flowsheet)  Additional info: Rapid called for sudden onset sharp chest pain radiating to left arm. Patient reports that pain radiates down left arm to left hand and results in numbness to left hand. Upon arrival of the rapid response team, patient reports that she is no longer experiencing pain. Her pain level is a 0/10. EKG and troponin ordered. EKG unremarkable. Troponin results to be reviewed when resulted.   MD Paged: Dr. Charles   Interventions:    Imaging/Tests: EKG    Labs: Troponin   Medications:      Other: N/A and    Disposition: Improved with rapid response team interventions. Primary RN updated on plan of care. Transfer not indicated at this time. Rapid Response Summary

## 2023-11-09 NOTE — PROCEDURES
VIDEO ELECTROENCEPHALOGRAM REPORT - EPILEPSY MONITORING UNIT (EMU)     REFERRING PROVIDER: Dr. Charles  DOS: 11/9/2023  ROOM: [place] [address]   TOTAL RECORDING TIME: 23 hours and 44 minutes of total recording time     INDICATION:  Sarita Loera 57 y.o. female presenting with seizure(s) and unspecified convulsions    RELEVANT MEDICATIONS/TREATMENTS:   Onfi 25 mg QHS  Topamax 50/100  S/p 2 mg IV  ativan    TECHNIQUE:   CVEEG was set up by a Neurodiagnostic technologist who performed education to the patient and staff. A minimum of 23 electrodes and 23 channel recording was setup and performed by Neurodiagnostic technologist, in accordance with the international 10-20 system. Impedence, electrode integrity, and technical impressions were documented a minimum of every 2-24 hour period by a Neurodiagnostic Technologist and reviewed by Interpreting Physician. The study was reviewed in bipolar and referential montages. The recording examined the patient in the awake and drowsy/sleep state(s).     DESCRIPTION OF THE RECORD:  During wakefulness, the background was continuous and showed a 10 Hz posterior dominant rhythm.  There was reactivity to eye closure/opening.  A normal anterior-posterior gradient was noted with faster beta frequencies seen anteriorly.  During drowsiness, theta/delta frequencies were seen.    Sleep was captured and was characterized by diffuse background delta/theta activity with a loss of myogenic artifact.  N2 sleep transients in the form of sleep spindles and vertex waves were seen in the leads over the central regions.     ICTAL AND INTERICTAL FINDINGS:   Occasional sharply contoured right or left temporal transients, suspicious but not definitively epileptiform. Ultimately, No definitive focal or generalized epileptiform activity noted.    During drowsiness and sleep, the emergency of intermittent right>left inferior posterior temporal-parietal sharply contoured theta/delta slowing, rarely  quasi-rhythmic at 2.5-3.5 Hz.  This finding, however, may be artifact.     No definitive seizures    ACTIVATION PROCEDURES:   NA    EKG: Sampling of the EKG recording showed abnormal QRS and ST segment changes.    EVENTS:  None    NOTE: This is a technically-limited study due to abundant myogenic, movement, and lead artifact obscuring most of the recording.      INTERPRETATION:  Potentially abnormal, although  technically-limited video EEG recording in the awake and drowsy/sleep state(s):  - During drowsiness and sleep, the emergency of intermittent right>left inferior posterior temporal-parietal sharply contoured theta/delta slowing, rarely quasi-rhythmic at 2.5-3.5 Hz.This finding, however, may be artifact.   -No definitive seizures      Douglas Charles MD  Department of Neurology at [facility]  General Neurologist and Epileptologist  Director of [facility] Level III Comprehensive Epilepsy Program  Professor of Clinical Neurology, [facility].   Phone: [phone]  Fax: [phone]  E-mail: [email or web address]

## 2023-11-09 NOTE — CARE PLAN
The patient is Water      Shift Goals  Clinical Goals: EEG monitoring  Patient Goals: Change sheets, maybe go home  Family Goals: ROSEANNE    Progress made toward(s) clinical / shift goals:    Continuous EEG monitoring in place.   Sheets changed.   D/C home tomorrow.       Problem: Knowledge Deficit - Standard  Goal: Patient and family/care givers will demonstrate understanding of plan of care, disease process/condition, diagnostic tests and medications  Outcome: Progressing     Problem: Pain - Standard  Goal: Alleviation of pain or a reduction in pain to the patient’s comfort goal  Outcome: Progressing     Problem: Seizure Precautions  Goal: Implementation of seizure precautions  Outcome: Progressing

## 2023-11-10 VITALS
HEIGHT: 69 IN | HEART RATE: 68 BPM | WEIGHT: 181.22 LBS | TEMPERATURE: 97.5 F | DIASTOLIC BLOOD PRESSURE: 62 MMHG | RESPIRATION RATE: 16 BRPM | OXYGEN SATURATION: 93 % | SYSTOLIC BLOOD PRESSURE: 104 MMHG | BODY MASS INDEX: 26.84 KG/M2

## 2023-11-10 PROCEDURE — 95718 EEG PHYS/QHP 2-12 HR W/VEEG: CPT | Performed by: STUDENT IN AN ORGANIZED HEALTH CARE EDUCATION/TRAINING PROGRAM

## 2023-11-10 PROCEDURE — 700111 HCHG RX REV CODE 636 W/ 250 OVERRIDE (IP): Mod: JZ | Performed by: STUDENT IN AN ORGANIZED HEALTH CARE EDUCATION/TRAINING PROGRAM

## 2023-11-10 PROCEDURE — 99239 HOSP IP/OBS DSCHRG MGMT >30: CPT | Mod: 25 | Performed by: STUDENT IN AN ORGANIZED HEALTH CARE EDUCATION/TRAINING PROGRAM

## 2023-11-10 PROCEDURE — 4A10X4Z MONITORING OF CENTRAL NERVOUS ELECTRICAL ACTIVITY, EXTERNAL APPROACH: ICD-10-PCS | Performed by: STUDENT IN AN ORGANIZED HEALTH CARE EDUCATION/TRAINING PROGRAM

## 2023-11-10 PROCEDURE — A9270 NON-COVERED ITEM OR SERVICE: HCPCS | Performed by: STUDENT IN AN ORGANIZED HEALTH CARE EDUCATION/TRAINING PROGRAM

## 2023-11-10 PROCEDURE — 95711 VEEG 2-12 HR UNMONITORED: CPT | Performed by: STUDENT IN AN ORGANIZED HEALTH CARE EDUCATION/TRAINING PROGRAM

## 2023-11-10 PROCEDURE — 700102 HCHG RX REV CODE 250 W/ 637 OVERRIDE(OP): Performed by: STUDENT IN AN ORGANIZED HEALTH CARE EDUCATION/TRAINING PROGRAM

## 2023-11-10 RX ORDER — LORAZEPAM 0.5 MG/1
0.5 TABLET ORAL
Qty: 30 TABLET | Refills: 0 | Status: SHIPPED | OUTPATIENT
Start: 2023-11-10 | End: 2023-12-10

## 2023-11-10 RX ORDER — CLOBAZAM 10 MG/1
25 TABLET ORAL NIGHTLY
Qty: 75 TABLET | Refills: 5 | Status: SHIPPED | OUTPATIENT
Start: 2023-11-10 | End: 2023-12-22

## 2023-11-10 RX ORDER — TOPIRAMATE 100 MG/1
TABLET, FILM COATED ORAL
Qty: 135 TABLET | Refills: 1 | Status: SHIPPED | OUTPATIENT
Start: 2023-11-10 | End: 2023-12-22

## 2023-11-10 RX ADMIN — KETOROLAC TROMETHAMINE 30 MG: 30 INJECTION, SOLUTION INTRAMUSCULAR; INTRAVENOUS at 07:50

## 2023-11-10 RX ADMIN — TOPIRAMATE 50 MG: 25 TABLET, FILM COATED ORAL at 05:04

## 2023-11-10 ASSESSMENT — PAIN DESCRIPTION - PAIN TYPE: TYPE: ACUTE PAIN

## 2023-11-10 ASSESSMENT — FIBROSIS 4 INDEX: FIB4 SCORE: 1.36

## 2023-11-10 NOTE — PROCEDURES
VIDEO ELECTROENCEPHALOGRAM REPORT - EPILEPSY MONITORING UNIT (EMU)     REFERRING PROVIDER: Dr. Charles  DOS: 11/10/2023  ROOM: [facility]/02   TOTAL RECORDING TIME: 2 hours and 33 minutes of total recording time     INDICATION:  Sarita Loera 57 y.o. female presenting with seizure(s) and unspecified convulsions    RELEVANT MEDICATIONS/TREATMENTS:   Onfi 25 mg QHS  Topamax 50/100      TECHNIQUE:   CVEEG was set up by a Neurodiagnostic technologist who performed education to the patient and staff. A minimum of 23 electrodes and 23 channel recording was setup and performed by Neurodiagnostic technologist, in accordance with the international 10-20 system. Impedence, electrode integrity, and technical impressions were documented a minimum of every 2-24 hour period by a Neurodiagnostic Technologist and reviewed by Interpreting Physician. The study was reviewed in bipolar and referential montages. The recording examined the patient in the awake and drowsy state(s).     DESCRIPTION OF THE RECORD:  During wakefulness, the background was continuous and showed a 10 Hz posterior dominant rhythm.  There was reactivity to eye closure/opening.  A normal anterior-posterior gradient was noted with faster beta frequencies seen anteriorly.  During drowsiness, theta/delta frequencies were seen.    Sleep was not captured.    ICTAL AND INTERICTAL FINDINGS:   Occasional sharply contoured right or left temporal transients, suspicious but not definitively epileptiform. Ultimately, No definitive focal or generalized epileptiform activity noted.    During drowsiness and sleep, the emergency of intermittent right>left inferior posterior temporal-parietal sharply contoured theta/delta slowing, rarely quasi-rhythmic at 2.5-3.5 Hz.  This finding, however, may be artifact.     No definitive seizures    ACTIVATION PROCEDURES:   NA    EKG: Sampling of the EKG recording showed abnormal QRS and ST segment changes.    EVENTS:  None    NOTE: This is a  technically-limited study due to abundant myogenic, movement, and lead artifact obscuring most of the recording.      INTERPRETATION:  Potentially abnormal, although  technically-limited video EEG recording in the awake and drowsy state(s):  - During drowsiness and sleep, the emergency of intermittent right>left inferior posterior temporal-parietal sharply contoured theta/delta slowing, rarely quasi-rhythmic at 2.5-3.5 Hz.This finding, however, may be artifact.   -No definitive seizures  -Compared to the prior study, no major differences were seen      Douglas Charles MD  Department of Neurology at [facility]  General Neurologist and Epileptologist  Director of [facility]'s Level III Comprehensive Epilepsy Program  Professor of Clinical Neurology, [facility].   Phone: [phone]  Fax: [phone]  E-mail: [email or web address]

## 2023-11-10 NOTE — CARE PLAN
The patient is Stable - Low risk of patient condition declining or worsening    Shift Goals  Clinical Goals: Monitor for seizures  Patient Goals: Go home  Family Goals: ROSEANNE    Progress made toward(s) clinical / shift goals:  Patient is A&Ox4. Educated patient on POC. EMU monitoring in place. Seizure, Fall and Aspiration precautions in place. Patient denies pain. No witnessed seizures. Bed is low and locked. Bed alarm on. Call light within reach. Hourly rounding continues.     Patient is not progressing towards the following goals:

## 2023-11-10 NOTE — PROGRESS NOTES
Pt in DCL. IV removed. No meds needed for delivery Paperwork gone through and signed. No questions or concerns at this time.

## 2023-11-10 NOTE — PROGRESS NOTES
NEUROLOGY EMU DAILY PROGRESS NOTE 11/9/2023     REFERRING PROVIDER: Dr. Charles    REASON FOR ADMISSION: Reason for EMU Admission: Medication titration/optimization, Determine degree of epileptic burden, if any, Determine presence of unrecognized and/or elecrographic seizures, Seizure localization and lateralization, and Control seizure     INTERVAL HISTORY:      One possible seizure captured last night around 11:30 PM as detailed in the procedure note.  Clinically, seizure appeared as  described it.  Unfortunately, intermittent connectivity issues of the leads rendered the EEG unreadable during seizure/clinical event.    Patient had a couple episodes of anxiety attacks that were not associated with any EEG abnormalities.  Discussed this at length with the patient who admits that she has a lot of anxiety and stress and is interested in addressing it with treatment    Patient had reported chest pain earlier today.  EKG and troponins were ordered.  No concerning EKG findings to suggest ACS but she is notably bradycardic and has long QT C as well as by atrial enlargement predisposing her to potential cardiac arrhythmias.  Also, troponins were normal.  This may have been a panic attack.    Started Topamax last night which she appears to be tolerating.  We will increase Topamax so that she takes 50 in the morning 100 mg at night.  Discussed that this is a medication which can help with tremors, headache prevention, and seizure prevention.  And also can help curb addictive nicotine cravings.  We will also increase her clobazam from 20 to 25 mg per night given that this is been a medication most beneficial and reducing the frequency of her nocturnal seizures.    Extensive discussion with the patient regarding importance of smoking sensation.    Screen patient for sleep apnea.  She has several risk factors placing her at high risk for having sleep apnea.      PHYSICAL EXAM:   /60   Pulse 68   Temp 36.4 °C  "(97.5 °F) (Temporal)   Resp 17   Ht 1.753 m (5' 9\")   Wt 83.1 kg (183 lb 3.2 oz)   SpO2 (!) 87%     Physical Exam  Constitutional:       Appearance: She is well-developed.   HENT:      Head: Normocephalic and atraumatic.      Nose: Nose normal.   Eyes:      Pupils: Pupils are equal, round, and reactive to light.   Cardiovascular:      Rate and Rhythm: Normal rate and regular rhythm.   Pulmonary:      Effort: Pulmonary effort is normal.   Musculoskeletal:         General: Normal range of motion.      Cervical back: Normal range of motion.   Skin:     General: Skin is warm.   Neurological:      Mental Status: She is alert and oriented to person, place, and time.      Motor: Motor strength is normal.No abnormal muscle tone.   Psychiatric:         Mood and Affect: Mood is depressed.        NEUROLOGICAL EXAM:   Neurological Exam  Mental Status  Alert. Oriented to person, place, and time. Language is fluent with no aphasia.    Cranial Nerves  CN III, IV, VI: Pupils equal round and reactive to light bilaterally.    Motor   The following abnormal movements were seen: Strength is 5/5 throughout all four extremities.  Head tremor, titubation noted.    Gait    Deferred.       Assessment & Plan  , AND EDUCATION/COUNSELING  This is a 57 y.o. female who presents to the EMU to characterize and localize epileptic activity, identify the degree of epileptic burden, if any, evaluate for unrecognized seizures, and to optimize medications. =  I had an extensive discussion with the patient about the purpose of the admission. Discussed the purpose of provocative maneuvers such as photic stimulation, hyperventilation, and/or sleep deprivation which may unmask epileptic activity. We discussed that it may be necessary to decrease, or discontinue altogether, any and all anti-seizure medications to achieve the goals of the admission. I explained that this places patient at higher risk for seizures and status epilepticus, a serious " life-threatening emergency with the potential for serious injury or death. It is therefore critical to the patient's safety that he/she monitored in the epilepsy monitoring unit for multiple days to mitigate the risk for serious injury or death. Discussed that the EMU and its personnel mitigate risk as there are rescue medications on hand if needed for any prolonged or repetitive seizures; trained nursing staff, EEG technicians, and a board certified epileptologist available 24/7; and continuous EEG and video surveillance being monitoring live by trained personnel at all times.      Discussed the importance of maintaining seizure precautions at all times. Discussed the importance of notifying nursing staff by using the call button for any concerns or needs, especially to assist with ambulation or transtions into and out of bed. Emphasized that the patient is at a higher for falling and potential subsequent injury due to the cumbersome equipment being worn, as well as other potential factors that may impair balance. It is therefore critical that the patient refrain from getting out of bed or walking without staff assistance.     Discussed the importance and rationale for DVT prophylaxis as well.     Finally, counseled the patient on using the push-button should she/he have any events concerning for seizure. Encourage visitors to press the button as well if a seizure or aura is witnessed and the patient is unable to press the button herself/himself.     Patient agreed to the above discussion. All questions/concerns were addressed.    PLAN:  Patient with possible seizures as detailed above.  Further increasing Topamax given her initial tolerability yesterday.  Stopping Lamictal given its unclear benefit for seizure control and especially in light of the fact that that she needs further cardiac work-up Lamictal, as discussed with the patient, can cause serious cardiovascular complications in those with structural heart  disease.  Further plans are as outlined below.  We will plan discharge tomorrow morning with outpatient follow-up with me in clinic within the next 4 to 6 weeks or so.      Nocturnal convulsions occurring with unclear frequency. Formal diagnosis of epilepsy needs ot be made  Events in questions to capture  Nocturnal seizures - one possible but not definitive seizure captured on video but not EEG which is detailed in procedure note.  Continuous VEEG monitoring  Vitals and neurological checks as ordered  Telemetry  Routine Admission labs: CBC, CMP,   Other diagnostics if applicable: NA  HV and PS to be performed on Day 1 of admission and at the discretion of the attending epileptologist on subsequent days  Rescue Ativan Protocol ordered  Sleep deprivation: No.   Active antiseizure regimen:  Lamictal 100 mg TID-> DC'ed Lamictal beginning morning of Day 2  Onfi 20 mg QHS-> 25 mg QHS evening on 11/9  Tomamax 50 mg BID started evening of 11/08/23 -> increase to 50/100 mg    Intractable headache/migraines  -s/p once time dose of triptan, medium dose on Day. Will refrain from given patient any more triptans given her higher risk of cardiovascular/cerebraovascular events which can be brought on by triptan use  -toradol IV prn  -IV fluid bolus, IV Magnesium if needed  -topamax added as above for headache prevention.  Further increasing it from 50 milligrams twice daily to 50 mg/100 mg      Insomnia  -hydroxyzine added 11/08/23   -address pain  -Address anxiety  -Increasing clobazam from 20 to 25 mg per night for better seizure control but will also have a secondary benefit of helping her insomnia    Nicotine Dependence  -smoking cessation counseling  -nicotine gum/patches ordered-patient has not been using    Excessive anxiety, panic attacks, and depressive symptoms  Patient is interested in starting an antidepressant but after discussing with her we will see how she tolerates first a couple changes to the antiseizure  medications but will have close follow-up in the outpatient setting to determine if starting an antidepressant remains rate for her    High risk for sleep apnea  -Referral to sleep medicine and for sleep study to be placed upon discharge  -Extensive discussion on the importance of treating this if she does not fact have this diagnosis.  It can have significant impact on her cognition, mood, chronic pain symptoms including and especially headaches, epilepsy, risk for heart attack/stroke, risk for hypertension/diabetes, among others    Abnormal EKG; resting bradycardia; long QT syndrome  -Stop triptans  -Outpatient echo will be ordered.  We will consider stress test and/or referral to cardiology  -Avoid arrhythmia genic medications starting with discontinuation of Lamictal  -We will defer further management to patient's PCP      OTHER ITEMS:  DVT Ppx: Lovenox and/or SCDs  DIET: Regular  Bowel regimen  PRN analgesics available for pain  PRN antiemetics available    CODE STATUS:   FULL CODE    BILLING DOCUMENTATION:     I spent a total of 100 minutes of face-to-face time in this visit. Over 50% of the time of the visit today was spent on counseling and/or coordination of care wtih the patient and/or family, as above in assessment in plan.    Douglas Charles MD  Department of Neurology at [facility]  General Neurologist and Epileptologist  Director of [facility]'s Level III Comprehensive Epilepsy Program  Professor of Clinical Neurology, [facility].   Phone: [phone]  Fax: [phone]  E-mail: [email or web address]

## 2023-11-27 NOTE — DISCHARGE SUMMARY
EMU Discharge Summary    ADMISSION DATE: 11/6/2023  6:18 AM  DISCHARGE DATE:: 11/10/2023  1:04 PM  REFERRING PROVIDER: Dr. Charles  REASON(S) FOR ADMISSION: Reason for EMU Admission: Medication titration/optimization, Determine degree of epileptic burden, if any, Determine presence of unrecognized and/or elecrographic seizures, Seizure localization and lateralization, and Control seizure      FOLLOW-UP ITEMS AFTER DISCHARGE:  Follow-up in epilepsy clinic with Dr. Charles  Continue to address epileptic convulsions - probable epilepsy  Ensure tolerability/efficacy of topamax; follow-up on how she is doing with higher dose of clobazam  Follow-up on intractable migraines  She should STOP all triptans given her abnormal EKG, cardiovascular risk factors  F/u on anxiety and depression and significant stress - consider SSRI and/or referral to psychiatry/psychology  Follow-up on sleep medicine referral - she is ihg risk for sleep apnea  Follow-up on smoking cessation  Patient should follow-up with her primary are physician especially about EKG abnormalities    MEDICATIONS ON DISCHARGE:      Medication List        START taking these medications        Instructions   topiramate 100 MG Tabs  Commonly known as: Topamax   Take 0.5 Tablets by mouth every morning AND 1 Tablet every evening. Do all this for 180 days.            CHANGE how you take these medications        Instructions   clobazam 10 MG Tabs tablet  What changed:   medication strength  See the new instructions.  Another medication with the same name was removed. Continue taking this medication, and follow the directions you see here.  Commonly known as: Onfi   Doctor's comments: This is a dose change. Please fill ASAP. 10 mg tabs with instructions to take 2.5 tabs (25 mg) each night. 75 tabs to cover 30 days plus 2 refills for a total of 90 days of coverage  Take 2.5 Tablets by mouth every evening for 180 days.  Dose: 25 mg            CONTINUE taking these medications         Instructions   acetaminophen 500 MG Tabs  Commonly known as: Tylenol   Take 500-1,000 mg by mouth every 6 hours as needed. Indications: Pain  Dose: 500-1,000 mg     LORazepam 0.5 MG Tabs  Commonly known as: Ativan   Take 1 Tablet by mouth 1 time a day as needed (Seizures or anxiety) for up to 30 days. Indications: Feeling Anxious, seizures  Dose: 0.5 mg     Nurtec 75 MG Tbdp  Generic drug: Rimegepant Sulfate   Take 1 Tablet by mouth as needed (migraine). 1 tab at headache onset; repeat in 24 hours  Dose: 1 Tablet     VITAMIN B COMPLEX PO   Take 1 Tablet by mouth every day.  Dose: 1 Tablet     VITAMIN D PO   Take 1 Tablet by mouth every day.  Dose: 1 Tablet            STOP taking these medications      LamoTRIgine 200 MG Tb24              DISCHARGE DIAGNOSE(S):  Nocturnal convulsions representing probable  Abnormal EEG  Intractable headache/migraines  Insomnia  Nicotine Dependence  Excessive anxiety, stress, panic attacks, and depressive symptoms  High risk for sleep apnea  Abnormal EKG; resting bradycardia; long QT syndrome  Chest Pain  Dehydration  Elevated BUN  Isolated elevation of alk. Phos., unclear etiology    FOLLOW UP APPOINTMENTS:  Future Appointments         Provider Department Center    12/22/2023 1:00 PM Douglas Charles M.D. [facility] Neurology              [facility], PRIOR WORK-UP, EXAM AS PER H&P FROM THIS ADMISSION:     Patient with intractable seizures, most of them nocturnal convulsions, that are still occurring with a high frequency (at least a few every couple of months). Although they have overall decreased in frequency  over the past 6-12 months from multiple convulsions per month, she still is uncontrolled and remains at high risk of SUDEP. She needs admission to the EMU to get her seizures under control, optimize her medication, and for pre-surgical evaluation. She has worsening migraines too and I advised that she stop taking her triptans in the setting of new onset chest pain while we start a  work-up to exclude cardiovascular disease. No changes to her AEDs. Will start nurtec in place of triptan for rescue medication. Will plan to check VNS when during this EMU admission     Patient's current AED regimen is Lamictal 100 mg TID and Onfi 20 mg QHS,     Of note, patient had a prior 48 hour ambulatory EEG done December 2021 that was normal.    PRIOR WORK-UP:  11/2017  EEG NTERPRETATION:  This is an abnormal video EEG recording in the awake, and drowsy state(s). Frequent left anterior temporal sharps and intermittent left anterior temporal slowing. The findings increase risk for seizures and suggest underlying area of cortical irritability and structural abnormality. Clinical and radiological correlation is recommended.     11/17/2017 MRI IMPRESSION:     1.  No evidence of acute territorial infarct, intracranial hemorrhage or mass lesion.  2.  Rare scattered nonspecific periventricular foci of T2 and FLAIR signal hyperintensities consistent with microangiopathic ischemic change versus demyelination or gliosis.    Physical Exam  Constitutional:       Appearance: She is well-developed.   HENT:      Head: Normocephalic and atraumatic.      Nose: Nose normal.   Eyes:      Pupils: Pupils are equal, round, and reactive to light.   Cardiovascular:      Rate and Rhythm: Normal rate and regular rhythm.   Pulmonary:      Effort: Pulmonary effort is normal.   Musculoskeletal:         General: Normal range of motion.      Cervical back: Normal range of motion.   Skin:     General: Skin is warm.   Neurological:      Mental Status: She is alert and oriented to person, place, and time.      Motor: Motor strength is normal.No abnormal muscle tone.   Psychiatric:         Mood and Affect: Mood is depressed.         NEUROLOGICAL EXAM:   Neurological Exam  Mental Status  Alert. Oriented to person, place, and time. Language is fluent with no aphasia.     Cranial Nerves  CN III, IV, VI: Pupils equal round and reactive to light  bilaterally.     Motor   The following abnormal movements were seen: Strength is 5/5 throughout all four extremities.  Head tremor, titubation noted.     Gait     Deferred.    HOSPITAL COURSE:  Patient with possible seizure on 11/8 which is detailed procedure note.  Clinically, seizure appeared as  described it.  Unfortunately, intermittent connectivity issues of the leads rendered the EEG unreadable during seizure/clinical event.     Patient had a couple episodes of anxiety attacks that were not associated with any EEG abnormalities.  Discussed this at length with the patient who admits that she has a lot of anxiety and stress and is interested in addressing it with treatment     Patient had reported chest pain earlier today.  EKG and troponins were ordered.  No concerning EKG findings to suggest ACS but she is notably bradycardic and has long QT C as well as by atrial enlargement predisposing her to potential cardiac arrhythmias.  Also, troponins were normal.  This may have been a panic attack.     Started Topamax last night which she appears to be tolerating.  We will increase Topamax so that she takes 50 in the morning 100 mg at night.  Discussed that this is a medication which can help with tremors, headache prevention, and seizure prevention.  And also can help curb addictive nicotine cravings.  We will also increase her clobazam from 20 to 25 mg per night given that this is been a medication most beneficial and reducing the frequency of her nocturnal seizures.     Extensive discussion with the patient regarding importance of smoking sensation.     Screen patient for sleep apnea.  She has several risk factors placing her at high risk for having sleep apnea. Further increasing Topamax given her initial tolerability yesterday.  Stopping Lamictal given its unclear benefit for seizure control and especially in light of the fact that that she needs further cardiac work-up. Lamictal, as discussed with the  patient, can cause serious cardiovascular complications in those with structural heart disease.  Further conditions addressed this hospitalization are as outlined below  We will plan discharge tomorrow morning with outpatient follow-up with me in clinic within the next 4 to 6 weeks or so.       Nocturnal convulsions occurring with unclear frequency. Probable epilepsy  Events in questions to capture  Nocturnal seizures - one possible but not definitive seizure captured on video but not EEG which is detailed in procedure note.  Consider repeat EMU admission or 72 hour ambulatory EEG   Active antiseizure regimen:  Lamictal 100 mg TID-> DC'ed Lamictal beginning morning of Day 2  Onfi 20 mg QHS-> 25 mg QHS evening on 11/9  Tomamax 50 mg BID started evening of 11/08/23 -> increase to 50/100 mg     Intractable headache/migraines  -s/p once time dose of triptan, medium dose on Day. Will refrain from given patient any more triptans given her higher risk of cardiovascular/cerebraovascular events which can be brought on by triptan use  -toradol IV prn  -IV fluid bolus, IV Magnesium if needed  -topamax added as above for headache prevention.  Further increasing it from 50 milligrams twice daily to 50 mg/100 mg        Insomnia  -hydroxyzine added 11/08/23   -address pain  -Address anxiety  -Increasing clobazam from 20 to 25 mg per night for better seizure control but will also have a secondary benefit of helping her insomnia     Nicotine Dependence  -smoking cessation counseling  -nicotine gum/patches ordered-patient has not been using     Excessive anxiety, panic attacks, and depressive symptoms  Patient is interested in starting an antidepressant but after discussing with her we will see how she tolerates first a couple changes to the antiseizure medications but will have close follow-up in the outpatient setting to determine if starting an antidepressant remains rate for her     High risk for sleep apnea  -Referral to sleep  medicine and for sleep study to be placed upon discharge  -Extensive discussion on the importance of treating this if she does not fact have this diagnosis.  It can have significant impact on her cognition, mood, chronic pain symptoms including and especially headaches, epilepsy, risk for heart attack/stroke, risk for hypertension/diabetes, among others     Abnormal EKG; resting bradycardia; long QT syndrome  -Stop triptans  -Outpatient echo will be ordered.  We will consider stress test and/or referral to cardiology  -Avoid arrhythmia genic medications starting with discontinuation of Lamictal  -We will defer further management to patient's PCP      EEG SUMMARY:   Potentially abnormal, although very technically-limited video EEG recording in the awake and drowsy/sleep state(s):  - During drowsiness and sleep, the emergency of intermittent right>left inferior posterior temporal-parietal sharply contoured theta/delta slowing, rarely quasi-rhythmic at 2.5-3.5 Hz.This finding, however, may be artifact.   -One clinical event occurred at 11:45 PM and lasted a few minutes.  Unfortunately from a combination of generalized 60 Hz artifact and issues with connectivity the background EEG during the clinical event was largely unreadable/uninterpretable.  Clinically, patient appeared to wake up appear anxious, right hand started shaking/trembling on and off in a semirhythmic fashion.  She reaches for a cup with the right hand outstretched, but does not grab the cup, her hand extended and still trembling.  Patient motors something but it is not discernible.  Her face appears contorted, right arm stiffening and posturing abnormally, becomes tearful.  My nurses into the room and examined the patient.  She is able to track them and seems to follow commands shortly after they enter. Cannot conclusively say this was a seizure based on its clinical features alone, but a seizure remains a possibility    EXAM ON DATE OF  DISCHARGE:    Physical/Neurological Exam at discharge if different than Admission Exam: NA    Therefore,  patient is discharged in fair and stable condition to home with close outpatient follow-up.    The patient met 2-midnight criteria for an inpatient stay at the time of discharge.    CODE STATUS: Full Code    POST DISCHARGE DIET RECOMMENDATION: Regular Diet    POST-DISCHARGE ACTIVITY RECOMMENDATIONS:  As tolerated.  Weight bearing as tolerated    RECOMMENDATIONS FROM CONSULTANTS IF APPLICABLE: NA    PROCEDURES: Long-Term Video EEG    BILLING DOCUMENTATION:  Total time of the discharge process exceeded 135 minutes.    Douglas Charles MD  Department of Neurology at [facility]  Diplomate of the American Board of Psychiatry and Neurology, General Neurology  Diplomate of American Board of Psychiatry and Neurology, a Member Board of the American Board of Medical Subspecialties, Epilepsy  Director of [facility]'s Level III Comprehensive Epilepsy Program  Professor of Clinical Neurology, [employer] [facility].   Number: [phone]  Fax: [phone]  E-mail: [email or web address]

## 2023-12-22 ENCOUNTER — OFFICE VISIT (OUTPATIENT)
Dept: NEUROLOGY | Facility: MEDICAL CENTER | Age: 58
End: 2023-12-22
Attending: STUDENT IN AN ORGANIZED HEALTH CARE EDUCATION/TRAINING PROGRAM
Payer: MEDICAID

## 2023-12-22 VITALS
BODY MASS INDEX: 26.71 KG/M2 | WEIGHT: 180.34 LBS | OXYGEN SATURATION: 93 % | SYSTOLIC BLOOD PRESSURE: 120 MMHG | TEMPERATURE: 97.3 F | HEIGHT: 69 IN | HEART RATE: 74 BPM | DIASTOLIC BLOOD PRESSURE: 66 MMHG

## 2023-12-22 DIAGNOSIS — F17.219 CIGARETTE NICOTINE DEPENDENCE WITH NICOTINE-INDUCED DISORDER: ICD-10-CM

## 2023-12-22 DIAGNOSIS — G47.9 SLEEP DISTURBANCE: ICD-10-CM

## 2023-12-22 DIAGNOSIS — Z01.89 NEEDS SLEEP APNEA ASSESSMENT: ICD-10-CM

## 2023-12-22 DIAGNOSIS — Z91.89 AT RISK FOR SLEEP APNEA: ICD-10-CM

## 2023-12-22 DIAGNOSIS — F41.9 ANXIETY: ICD-10-CM

## 2023-12-22 DIAGNOSIS — R56.9 FOCAL SEIZURES (HCC): ICD-10-CM

## 2023-12-22 DIAGNOSIS — Z91.89 AT RISK FOR OSTEOPENIA: ICD-10-CM

## 2023-12-22 DIAGNOSIS — Z96.89 STATUS POST PLACEMENT OF VNS (VAGUS NERVE STIMULATION) DEVICE: ICD-10-CM

## 2023-12-22 DIAGNOSIS — G40.919 INTRACTABLE SEIZURE DISORDER (HCC): ICD-10-CM

## 2023-12-22 DIAGNOSIS — G40.909 SEIZURE DISORDER (HCC): ICD-10-CM

## 2023-12-22 DIAGNOSIS — M54.2 CERVICALGIA: ICD-10-CM

## 2023-12-22 DIAGNOSIS — M47.812 CERVICAL SPONDYLOSIS: ICD-10-CM

## 2023-12-22 DIAGNOSIS — R56.9 NOCTURNAL SEIZURES (HCC): ICD-10-CM

## 2023-12-22 DIAGNOSIS — G43.809 OTHER MIGRAINE WITHOUT STATUS MIGRAINOSUS, NOT INTRACTABLE: ICD-10-CM

## 2023-12-22 DIAGNOSIS — G43.819 OTHER MIGRAINE WITHOUT STATUS MIGRAINOSUS, INTRACTABLE: ICD-10-CM

## 2023-12-22 PROCEDURE — 3074F SYST BP LT 130 MM HG: CPT | Performed by: STUDENT IN AN ORGANIZED HEALTH CARE EDUCATION/TRAINING PROGRAM

## 2023-12-22 PROCEDURE — 99212 OFFICE O/P EST SF 10 MIN: CPT | Performed by: STUDENT IN AN ORGANIZED HEALTH CARE EDUCATION/TRAINING PROGRAM

## 2023-12-22 PROCEDURE — 3078F DIAST BP <80 MM HG: CPT | Performed by: STUDENT IN AN ORGANIZED HEALTH CARE EDUCATION/TRAINING PROGRAM

## 2023-12-22 PROCEDURE — 99214 OFFICE O/P EST MOD 30 MIN: CPT | Performed by: STUDENT IN AN ORGANIZED HEALTH CARE EDUCATION/TRAINING PROGRAM

## 2023-12-22 RX ORDER — RIMEGEPANT SULFATE 75 MG/75MG
1 TABLET, ORALLY DISINTEGRATING ORAL PRN
Qty: 10 TABLET | Refills: 5 | Status: SHIPPED | OUTPATIENT
Start: 2023-12-22 | End: 2024-01-21

## 2023-12-22 RX ORDER — LAMOTRIGINE 200 MG/1
200 TABLET, EXTENDED RELEASE ORAL 3 TIMES DAILY
COMMUNITY

## 2023-12-22 RX ORDER — CLOBAZAM 20 MG/1
TABLET ORAL
Qty: 90 TABLET | Refills: 2 | Status: SHIPPED | OUTPATIENT
Start: 2023-12-22 | End: 2024-03-22

## 2023-12-22 RX ORDER — CLOBAZAM 20 MG/1
60 TABLET ORAL DAILY
COMMUNITY

## 2023-12-22 RX ORDER — LAMOTRIGINE 200 MG/1
200 TABLET, EXTENDED RELEASE ORAL 3 TIMES DAILY
Qty: 90 TABLET | Refills: 5 | Status: SHIPPED | OUTPATIENT
Start: 2023-12-22 | End: 2024-06-19

## 2023-12-22 RX ORDER — LORAZEPAM 0.5 MG/1
TABLET ORAL
Qty: 60 TABLET | Refills: 0 | Status: SHIPPED | OUTPATIENT
Start: 2023-12-22 | End: 2024-01-21

## 2023-12-22 ASSESSMENT — FIBROSIS 4 INDEX: FIB4 SCORE: 1.38

## 2023-12-22 NOTE — PROGRESS NOTES
"NEUROLOGY FOLLOW-UP - 12/22/2023     REASON FOR VISIT: Sarita Loera 58 y.o. female presents today for follow-up       SUMMARY RELEVANT PAST MEDICAL HISTORY AND/OR COMPENDIUM OF RELEVANT WORK-UP AND TREATMENTS TO DATE:  I recently admitted patient to the EMU in Nov 2023 whose hospital course is detailed below:    \"Patient with possible seizure on 11/8 which is detailed procedure note.  Clinically, seizure appeared as  described it.  Unfortunately, intermittent connectivity issues of the leads rendered the EEG unreadable during seizure/clinical event.     Patient had a couple episodes of anxiety attacks that were not associated with any EEG abnormalities.  Discussed this at length with the patient who admits that she has a lot of anxiety and stress and is interested in addressing it with treatment     Patient had reported chest pain earlier today.  EKG and troponins were ordered.  No concerning EKG findings to suggest ACS but she is notably bradycardic and has long QT C as well as by atrial enlargement predisposing her to potential cardiac arrhythmias.  Also, troponins were normal.  This may have been a panic attack.     Started Topamax last night which she appears to be tolerating.  We will increase Topamax so that she takes 50 in the morning 100 mg at night.  Discussed that this is a medication which can help with tremors, headache prevention, and seizure prevention.  And also can help curb addictive nicotine cravings.  We will also increase her clobazam from 20 to 25 mg per night given that this is been a medication most beneficial and reducing the frequency of her nocturnal seizures.     Extensive discussion with the patient regarding importance of smoking sensation.     Screen patient for sleep apnea.  She has several risk factors placing her at high risk for having sleep apnea. Further increasing Topamax given her initial tolerability yesterday.  Stopping Lamictal given its unclear benefit for " seizure control and especially in light of the fact that that she needs further cardiac work-up. Lamictal, as discussed with the patient, can cause serious cardiovascular complications in those with structural heart disease.  Further conditions addressed this hospitalization are as outlined below  We will plan discharge tomorrow morning with outpatient follow-up with me in clinic within the next 4 to 6 weeks or so.         Nocturnal convulsions occurring with unclear frequency. Probable epilepsy  Events in questions to capture  Nocturnal seizures - one possible but not definitive seizure captured on video but not EEG which is detailed in procedure note.  Consider repeat EMU admission or 72 hour ambulatory EEG   Active antiseizure regimen:  Lamictal 100 mg TID-> DC'ed Lamictal beginning morning of Day 2  Onfi 20 mg QHS-> 25 mg QHS evening on 11/9  Tomamax 50 mg BID started evening of 11/08/23 -> increase to 50/100 mg     Intractable headache/migraines  -s/p once time dose of triptan, medium dose on Day. Will refrain from given patient any more triptans given her higher risk of cardiovascular/cerebraovascular events which can be brought on by triptan use  -toradol IV prn  -IV fluid bolus, IV Magnesium if needed  -topamax added as above for headache prevention.  Further increasing it from 50 milligrams twice daily to 50 mg/100 mg        Insomnia  -hydroxyzine added 11/08/23   -address pain  -Address anxiety  -Increasing clobazam from 20 to 25 mg per night for better seizure control but will also have a secondary benefit of helping her insomnia     Nicotine Dependence  -smoking cessation counseling  -nicotine gum/patches ordered-patient has not been using     Excessive anxiety, panic attacks, and depressive symptoms  Patient is interested in starting an antidepressant but after discussing with her we will see how she tolerates first a couple changes to the antiseizure medications but will have close follow-up in the  outpatient setting to determine if starting an antidepressant remains rate for her     High risk for sleep apnea  -Referral to sleep medicine and for sleep study to be placed upon discharge  -Extensive discussion on the importance of treating this if she does not fact have this diagnosis.  It can have significant impact on her cognition, mood, chronic pain symptoms including and especially headaches, epilepsy, risk for heart attack/stroke, risk for hypertension/diabetes, among others     Abnormal EKG; resting bradycardia; long QT syndrome  -Stop triptans  -Outpatient echo will be ordered.  We will consider stress test and/or referral to cardiology  -Avoid arrhythmia genic medications starting with discontinuation of Lamictal  -We will defer further management to patient's PCP        EEG SUMMARY:   Potentially abnormal, although very technically-limited video EEG recording in the awake and drowsy/sleep state(s):  - During drowsiness and sleep, the emergency of intermittent right>left inferior posterior temporal-parietal sharply contoured theta/delta slowing, rarely quasi-rhythmic at 2.5-3.5 Hz.This finding, however, may be artifact.   -One clinical event occurred at 11:45 PM and lasted a few minutes.  Unfortunately from a combination of generalized 60 Hz artifact and issues with connectivity the background EEG during the clinical event was largely unreadable/uninterpretable.  Clinically, patient appeared to wake up appear anxious, right hand started shaking/trembling on and off in a semirhythmic fashion.  She reaches for a cup with the right hand outstretched, but does not grab the cup, her hand extended and still trembling.  Patient motors something but it is not discernible.  Her face appears contorted, right arm stiffening and posturing abnormally, becomes tearful.  My nurses into the room and examined the patient.  She is able to track them and seems to follow commands shortly after they enter. Cannot  "conclusively say this was a seizure based on its clinical features alone, but a seizure remains a possibility?\"    INTERVAL HISTORY:  Patient returns with he  for follow-up.    We started topamax in the EMU to treat her seizures, migraines, tremors. When she was discharged , she was having bad mood side effects. She had one convulsion early after discharge. She swithced back to her prior med dosing of Lamictal 200 mg ER TID, Onfi 20/40  and ativan prn. She is also tolerating her VNS settings much better as well. Going back on her old medicines was the right move as she has not had any seizures for over a month, her mood is good, and she has more energy. She is still chronically poor, fragmented.           She is not taking topamax.    She has not seen a sleep doctor.    Mood is better as mentioned above since makign the switch back to old meds.     Also her migraines respond completely to nurtec prn which she takes 1-2 every 1-2 weeks to good effect.      CURRENT MEDICATIONS AT THE TIME OF THIS ENCOUNTER:  Current Outpatient Medications on File Prior to Visit   Medication Sig Dispense Refill    cloBAZam 20 MG Tab Take 60 mg by mouth every day.      LamoTRIgine (LAMICTAL XR) 200 MG TABLET SR 24 HR Take 200 mg by mouth in the morning, at noon, and at bedtime.      acetaminophen (TYLENOL) 500 MG Tab Take 500-1,000 mg by mouth every 6 hours as needed. Indications: Pain      B Complex Vitamins (VITAMIN B COMPLEX PO) Take 1 Tablet by mouth every day.      VITAMIN D PO Take 1 Tablet by mouth every day.       No current facility-administered medications on file prior to visit.          EXAM:   /66 (BP Location: Right arm, Patient Position: Sitting, BP Cuff Size: Adult)   Pulse 74   Temp 36.3 °C (97.3 °F) (Temporal)   Ht 1.753 m (5' 9\")   Wt 81.8 kg (180 lb 5.4 oz)   SpO2 93%    Wt Readings from Last 5 Encounters:   12/22/23 81.8 kg (180 lb 5.4 oz)   11/10/23 82.2 kg (181 lb 3.5 oz)   09/28/23 80.5 kg (177 " lb 7.5 oz)   06/23/23 74.8 kg (165 lb)   04/05/23 82.6 kg (182 lb 1.6 oz)      Physical Exam:  Physical Exam  Constitutional:       Appearance: She is well-developed.   HENT:      Head: Normocephalic and atraumatic.      Nose: Nose normal.   Eyes:      Pupils: Pupils are equal, round, and reactive to light.   Cardiovascular:      Rate and Rhythm: Normal rate and regular rhythm.   Pulmonary:      Effort: Pulmonary effort is normal.   Musculoskeletal:         General: Normal range of motion.      Cervical back: Normal range of motion.   Skin:     General: Skin is warm.   Neurological:      Mental Status: She is alert and oriented to person, place, and time.      Motor: Motor strength is normal.No abnormal muscle tone.      Gait: Gait normal.      Comments: No observable changes in neurologic status.  See initial new patient examination for details.    Psychiatric:         Mood and Affect: Mood normal.        Neurological Exam   Neurological Exam  Mental Status  Alert. Oriented to person, place, and time. Language is fluent with no aphasia.    Cranial Nerves  CN III, IV, VI: Pupils equal round and reactive to light bilaterally.    Motor   The following abnormal movements were seen: Strength is 5/5 throughout all four extremities.  Head tremor, titubation noted.    Gait   Normal gait.Casual gait is normal including stance, stride, and arm swing.  No observable changes in neurologic status.  See initial new patient examination for details. .         ASSESSMENT, EDUCATION, AND COUNSELING:  This is a 58 y.o. female patient who presents to the neurology clinic. We had an extensive discussion about the patient's symptoms, signs, and work-up to date, if any. We discussed potential and/or definitive diagnoses, work-up, and potential treatments.     PLAN:  If applicable, the work-up such as labs, imaging, procedures, and/or other testing, referrals, and/or recommended treatment strategies are listed below.    Medications were  administered today in clinic (if any):     Visit Diagnoses     ICD-10-CM   1. Nocturnal seizures (HCC)  R56.9   2. Other migraine without status migrainosus, not intractable  G43.809   3. Intractable seizure disorder (HCC)  G40.919   4. Seizure disorder (HCC)  G40.909   5. Cervicalgia  M54.2   6. Status post placement of VNS (vagus nerve stimulation) device  Z96.89   7. Cigarette nicotine dependence with nicotine-induced disorder  F17.219   8. Cervical spondylosis  M47.812   9. Sleep disturbance  G47.9   10. Other migraine without status migrainosus, intractable  G43.819   11. Focal seizures (HCC)  R56.9   12. At risk for osteopenia  Z91.89   13. Anxiety  F41.9   14. Needs sleep apnea assessment  Z01.89   15. At risk for sleep apnea  Z91.89      Orders Placed This Encounter    cloBAZam 20 MG Tab    LamoTRIgine (LAMICTAL XR) 200 MG TABLET SR 24 HR    Rimegepant Sulfate (NURTEC) 75 MG TABLET DISPERSIBLE    LORazepam (ATIVAN) 0.5 MG Tab    LamoTRIgine 200 MG TABLET SR 24 HR    cloBAZam 20 MG Tab        Patient with a history of nocturnal seizures, clinically captured, and doing better now that she is back on Lamictal  mg TID, Onfi 20 mg/40 mg. She has ativan 0.5 mg BID prn seizures. She is s/p VNS and is at a much better setting that she not only tolerates but has decreased seizures. I have refilled her medications. Referral for sleep apnea has been placed. We will f/u in 6 months at which point we will repat labs, recheck VNS.        BILLING DOCUMENTATION:     The number of minutes of face-to-face time spent in this encounter was I spent a total of 35 minutes on the day of the visit.  . Over 50% of the time of the visit today was spent on counseling and/or coordination of care wtih the patient and/or family, as outlined above in assessment in plan.    Douglas Charles MD  Department of Neurology at [facility]  Diplomate of the American Board of Psychiatry and Neurology, General  Neurology  Diplomate of American Board of Psychiatry and Neurology, a Member Board of the American Board of Medical Subspecialties, Epilepsy  Director of [facility] Level III Comprehensive Epilepsy Program  Professor of Clinical Neurology, [facility].   [address], [address]  [facility] [id number] [phone]   Fax: [phone]  E-mail: [email or web address]

## 2023-12-22 NOTE — PATIENT INSTRUCTIONS
NEUROLOGY CLINIC VISIT WITH DR. CHARLES     PLEASE READ THIS ENTIRE DOCUMENT CAREFULLY AND COMPLETELY:    First and foremost, you matter to Dr. Charles and you deserve the best care.   Dr. Charles prides himself on providing the best possible care to all his patients. He strives to make each appointment meaningful, so that all your concerns are being addressed and all your neurological problems are being optimally treated. In order to achieve these goals for everyone, Dr. Charles has listed important reminders and the best ways to prepare for each appointment. Please read each item carefully. Thank you!    Due to the high volume of patients we are trying to help, your physician will not be able to respond by phone or in [facility] to your routine concerns between appointments.  This does not reflect a lack of interest or concern for you or your diagnosis.  Please bring these questions and concerns to your appointment where your physician can answer.  Please relay more pressing concerns to our office, either via [facility], or by phone; if not able to reach us please visit nearby Urgent Care Center or Emergency Department.  If any emergent medical needs, please seek emergent medical help and/or call 911.    Also, please note that we are not able to fill out paperwork that might be related to your work, utility company, disability, and/or driving, among others, in between the visits.  Please schedule a dedicated appointment to address any and all paperwork.  This is not due to lack of concern or interest for your disease-related work/administrative problems, but to make sure that we provide the best possible care and to fill out your paperwork in a correct, complete, and timely manner.  ------------------------------------------------------------------------------------------  Please let our office know if you have any changes in your seizure frequency and/characteristics.     Please keep a diary of your seizures and bring it  with you to each appointment.    Please take vitamin D3 [id number] internation units daily.     Please abstain from driving until further notice    If you are a biological female with epilepsy who is of reproductive age, who is actively breastfeeding, and/or who infants/young children:  Please take folic acid 1 mg daily. This is an over-the-counter supplement that is recommended to prevent certain developmental problems in your baby, in case you become pregnant in the future.  It is critical that you let our office know as soon as you become pregnant or plan to become pregnant.  If you are caring for a baby/young child, please make sure to be sitting on a soft surface while holding your baby/young child, so in case you have a seizure, your baby/young child is not injured due to fall.   Please let us know if, while breastfeeding, you observe that your baby is excessively sleepy and/or has other behavioral changes. Because many antiseizure medications are collected in breast milk, some nursing babies can suffer adverse medication effects.    Please note that the following might precipitate seizures:   missed doses of antiseizure medications  being sick with a fever, stress  Fatigue  sleep deprivation or abnormal sleeping patterns  not eating regularly  not drinking enough water  drinking too much alcohol  stopping alcohol suddenly if you are currently using it on a regular/daily basis,   using recreational drugs, among others.    Please note that the following might lead to an injury or even be life-threatening in the event you have a seizure and/or lose awareness while:  being in a large body of water by yourself, such as bath, pool, lake, ocean, among others (risk of drowning)  being on unprotected heights (risk of fall)  being around and/or operating heavy machinery (risk of injury)  being around open fire/hot surfaces (risk of burns)  any other activities/circumstances, in which if you lose awareness, you might  injure yourself and/or others.  -------------------------------------------------------------------------------------------  SUDEP (SUDDEN UNEXPECTED DEATH IN EPILEPSY)  It is important that your seizures are well controlled and you have none or have them rarely. In addition to avoiding injury related to breakthrough seizures, frequent seizures increase risk of SUDEP (sudden unexpected death in epilepsy), where a person goes into a seizure and then never wakes up. The best way to prevent SUDEP is to control your seizures well.   ------------------------------------------------------------------------------------------  Please call for help (crisis line and/or 911) in case you have thoughts of harming yourself and/or others.  ------------------------------------------------------------------------------------------  INSTRUCTIONS FOR YOUR FAMILY/CAREGIVERS:  Please call 911 if the patient has a seizure longer than 2-3 minutes, if seizures are back to back without her recovering to her baseline, or she does not start recovering within 5-10 minutes after the seizure stops. During the seizure - please turn her on her side, please make sure her head is protected (for example, you should put a pillow under her head, if one is available), and please do not put anything in her mouth.   ------------------------------------------------------------------------------------------  PATIENT EXPECTATIONS,  IMPORTANT APPOINTMENT REMINDERS, AND ADDITIONAL HELPFUL TIPS:   REFILLS:   Request refills AT LEAST 1 week in advance to ensure you do not run out of medications    MyChart  It is STRONGLY encouraged that ALL patients sign up for MyChart. It is BY FAR the fastest and most convenient way for both Dr. Charles and patients to obtain timely refills.  If you are having trouble signing up or logging into your account, staff are available to help you. Please ask a medical assistant or staff at the  to assist you.    TEST RESULS:    All labs and diagnostic test results will be reviewed at your next visit, UNLESS  Dr. Charles determines that there are important findings on the tests need to be acted on sooner. Dr. Charles will either call or send a message through [facility] website if this is the case.    BE PREPARED PRIOR TO EVERY APPOINTMENT:  All patient are responsible for ensuring that ALL test results that were completed outside of the [facility] system have been received by our Neurology Department PRIOR to your appointment with Dr. Charles.    IMPORTANT:  ALL images (not just the reports) must be sent and uploaded to the [facility] system. Dr. Charles reviews all images personally prior to each visit. Ensuring that ALL the test results and test images are accessible to Dr. Charles prior to your appointment is YOUR responsibility and an important part of making the most out of each appointment.   Bring a government-issues picture ID and an updated insurance card EVERY visit.  It is highly recommended that you bring at every visit a list of the most important topics that you want address. While it may not be possible to address all items on the list in a single visit, preparing a list will ensure that Dr. Charles addresses the items that are most important to you and your health    PAPERWORK, DOCUMENTATION, LETTER REQUESTS:  You must notify the office ahead of your appointment of all paperwork or letter requests.   Please DO NOT wait until the last minute to make these requests. Please give all paperwork to the medical assistant at the start of the appointment and check-in process. Please note that Dr. Charles may not be able complete some types of documentation in a single appointment or even within a single day or week. This is why it is important to communicate paperwork requests prior to your appointment and at least 2 weeks prior to any deadlines.    KNOW ALL YOU MEDICATIONS:   AT EVERY SINGLE APPOINTMENT, please bring a list of every single prescribed,  non-prescribed, and over the counter medication or supplements you are taking, including ones taken on a rare or intermittent basis.  Include the following information for each prescribed or non-prescribed medications:  Name of medication   The strength of EACH pill/capsule/tablet, etc.   The number of pills/capsules/tablets, etc taken per dose  The number and time of day that doses are taken  For every single Supplement that you take on a routine or intermittent basis, you must include:  The Brand Name   A complete list of every single ingredient, compound, vitamin, and/or mineral in each dose, along with the corresponding amounts/strengths of all ingredients, vitamins, minerals, etc., if such information is provided or known  The number of doses taken per day and time of day doses are taken  If medications are taken on an intermittent or as needed basis, please estimate how many days per week or days per month the medications are used  DO NOT just print out your medication list from [facility] or bring a list from a prior appointment or hospitalizations because the information is often often unreliable, inaccurate, outdated, and/or incomplete   The list should be printed or written  If you forget or do not have a list of all the medication, then it is acceptable, although less preferred, to bring all the bottles to the appointment     ARRIVE EARLY FOR ALL VISITS:  Please note that we are unable to accommodate late arrivals as per office policy.  YOU-the patient - (NOT a parent, spouse, or friend) must be physically present at check-in no later than 12 minutes after the scheduled appointment time, or you will be asked to reschedule   Consider scheduling a virtual appointment with Dr. Charles through [facility] as an alternative if transportation to the clinic is difficult or unavailable   Please note, however, that virtual visits can only be scheduled after being an established patient of Dr. Charles. All new appointments  "must be done in-person in clinic  Some insurances will not cover the cost of virtual appointments. Please check with your insurance to find out if these visits are covered    COMMUNICATING URGENT AND NON-URGENT MATTERS:  Your concerns are important and deserve to be heard and addressed. If you have an urgent matter, there are two methods that will ensure your concerns are prioritized appropriately:   Preferred method: Sign-up/Login to your [facility] account and send a message addressed to Dr. Charles or Kellie Galvez (Dr. Charles's assistant). In the subject line, type \"urgent\" followed by a word or phrase describing the situation (For example, write \"Urgent: Out of antiseuzre med and need refill\" or \"Urgent: Severe side effects to new meds\". In doing this, our staff can ensure urgent messages are triaged appropriately and communicated to Dr. Charles that day.  Call [facility] Neu[facility] main line at [phone]. Dr. Charles's voicemail extension is [phone]. When leaving a voice message, specifically indicate if it is urgent (or non-urgent) so that the matter can be triaged appropriately and addressed in a timely manner    Thank you for entrusting your neurological care to [facility] and we look forward to continuing to serve you.   "

## 2023-12-26 ENCOUNTER — TELEPHONE (OUTPATIENT)
Dept: NEUROLOGY | Facility: MEDICAL CENTER | Age: 58
End: 2023-12-26
Payer: MEDICAID

## 2023-12-26 NOTE — TELEPHONE ENCOUNTER
Received Refill PA request via [employer]  for Nurtec 75mg. (Quantity:10 tab, Day Supply:30)     Insurance: [employer]  Member ID:  [id number]  BIN: [id number]  PCN: [id number]  [id number]     Ran Test claim via [place] & medication Pays for a $0.00 copay. Will outreach to patient to offer specialty pharmacy services and or release to preferred pharmacy

## 2023-12-26 NOTE — TELEPHONE ENCOUNTER
Attempted to contact patient at [phone] to discuss [facility] and services/benefits offered. No answer, left voicemail.      Carmen Landry

## 2024-03-20 DIAGNOSIS — G40.919 INTRACTABLE SEIZURE DISORDER (HCC): ICD-10-CM

## 2024-03-20 NOTE — TELEPHONE ENCOUNTER
Received request via: Pharmacy    Was the patient seen in the last year in this department? Yes    Does the patient have an active prescription (recently filled or refills available) for medication(s) requested? No    Pharmacy Name: [facility]    Does the patient have correction Plus and need 100 day supply (blood pressure, diabetes and cholesterol meds only)? Patient does not have SCP

## 2024-03-22 RX ORDER — CLOBAZAM 20 MG/1
TABLET ORAL
Qty: 90 TABLET | Refills: 2 | Status: SHIPPED | OUTPATIENT
Start: 2024-03-22 | End: 2024-06-22

## 2024-06-21 DIAGNOSIS — G40.919 INTRACTABLE SEIZURE DISORDER (HCC): ICD-10-CM

## 2024-06-21 DIAGNOSIS — R56.9 NOCTURNAL SEIZURES (HCC): ICD-10-CM

## 2024-06-21 RX ORDER — LAMOTRIGINE 200 MG/1
200 TABLET, EXTENDED RELEASE ORAL 3 TIMES DAILY
Qty: 90 TABLET | Refills: 5 | Status: SHIPPED | OUTPATIENT
Start: 2024-06-21 | End: 2024-12-18

## 2024-06-21 RX ORDER — CLOBAZAM 20 MG/1
60 TABLET ORAL
Qty: 90 TABLET | Refills: 5 | Status: SHIPPED | OUTPATIENT
Start: 2024-06-21 | End: 2024-12-18

## 2024-06-26 ENCOUNTER — APPOINTMENT (OUTPATIENT)
Dept: NEUROLOGY | Facility: MEDICAL CENTER | Age: 59
End: 2024-06-26
Attending: STUDENT IN AN ORGANIZED HEALTH CARE EDUCATION/TRAINING PROGRAM
Payer: MEDICAID

## 2024-08-06 ENCOUNTER — OFFICE VISIT (OUTPATIENT)
Dept: NEUROLOGY | Facility: MEDICAL CENTER | Age: 59
End: 2024-08-06
Attending: STUDENT IN AN ORGANIZED HEALTH CARE EDUCATION/TRAINING PROGRAM
Payer: MEDICAID

## 2024-08-06 ENCOUNTER — TELEPHONE (OUTPATIENT)
Dept: PHARMACY | Facility: MEDICAL CENTER | Age: 59
End: 2024-08-06

## 2024-08-06 VITALS
DIASTOLIC BLOOD PRESSURE: 58 MMHG | SYSTOLIC BLOOD PRESSURE: 110 MMHG | HEART RATE: 62 BPM | WEIGHT: 171.3 LBS | BODY MASS INDEX: 25.37 KG/M2 | OXYGEN SATURATION: 94 % | HEIGHT: 69 IN | TEMPERATURE: 97.8 F

## 2024-08-06 DIAGNOSIS — M48.02 SPINAL STENOSIS IN CERVICAL REGION: ICD-10-CM

## 2024-08-06 DIAGNOSIS — Z96.89 STATUS POST PLACEMENT OF VNS (VAGUS NERVE STIMULATION) DEVICE: ICD-10-CM

## 2024-08-06 DIAGNOSIS — G43.809 OTHER MIGRAINE WITHOUT STATUS MIGRAINOSUS, NOT INTRACTABLE: ICD-10-CM

## 2024-08-06 DIAGNOSIS — K22.719 BARRETT'S ESOPHAGUS WITH DYSPLASIA: ICD-10-CM

## 2024-08-06 DIAGNOSIS — G43.819 OTHER MIGRAINE WITHOUT STATUS MIGRAINOSUS, INTRACTABLE: ICD-10-CM

## 2024-08-06 DIAGNOSIS — F41.9 ANXIETY: ICD-10-CM

## 2024-08-06 DIAGNOSIS — R25.1 TREMOR: ICD-10-CM

## 2024-08-06 DIAGNOSIS — M47.812 CERVICAL SPONDYLOSIS: ICD-10-CM

## 2024-08-06 DIAGNOSIS — Z91.89 AT RISK FOR SLEEP APNEA: ICD-10-CM

## 2024-08-06 DIAGNOSIS — M54.2 CERVICALGIA: ICD-10-CM

## 2024-08-06 DIAGNOSIS — R00.2 PALPITATIONS: ICD-10-CM

## 2024-08-06 DIAGNOSIS — G47.9 SLEEP DISTURBANCE: ICD-10-CM

## 2024-08-06 DIAGNOSIS — F17.219 CIGARETTE NICOTINE DEPENDENCE WITH NICOTINE-INDUCED DISORDER: ICD-10-CM

## 2024-08-06 DIAGNOSIS — Z91.89 AT RISK FOR OSTEOPENIA: ICD-10-CM

## 2024-08-06 DIAGNOSIS — R56.9 NOCTURNAL SEIZURES (HCC): ICD-10-CM

## 2024-08-06 DIAGNOSIS — G47.9 SLEEP DISORDER: ICD-10-CM

## 2024-08-06 PROCEDURE — 95976 ALYS SMPL CN NPGT PRGRMG: CPT

## 2024-08-06 PROCEDURE — 3074F SYST BP LT 130 MM HG: CPT | Performed by: STUDENT IN AN ORGANIZED HEALTH CARE EDUCATION/TRAINING PROGRAM

## 2024-08-06 PROCEDURE — 99212 OFFICE O/P EST SF 10 MIN: CPT | Performed by: STUDENT IN AN ORGANIZED HEALTH CARE EDUCATION/TRAINING PROGRAM

## 2024-08-06 PROCEDURE — 95976 ALYS SMPL CN NPGT PRGRMG: CPT | Performed by: STUDENT IN AN ORGANIZED HEALTH CARE EDUCATION/TRAINING PROGRAM

## 2024-08-06 PROCEDURE — 99214 OFFICE O/P EST MOD 30 MIN: CPT | Performed by: STUDENT IN AN ORGANIZED HEALTH CARE EDUCATION/TRAINING PROGRAM

## 2024-08-06 PROCEDURE — 3078F DIAST BP <80 MM HG: CPT | Performed by: STUDENT IN AN ORGANIZED HEALTH CARE EDUCATION/TRAINING PROGRAM

## 2024-08-06 RX ORDER — RIMEGEPANT SULFATE 75 MG/75MG
1 TABLET, ORALLY DISINTEGRATING ORAL PRN
Qty: 20 TABLET | Refills: 5 | Status: SHIPPED | OUTPATIENT
Start: 2024-08-06 | End: 2024-09-05

## 2024-08-06 ASSESSMENT — PATIENT HEALTH QUESTIONNAIRE - PHQ9: CLINICAL INTERPRETATION OF PHQ2 SCORE: 0

## 2024-08-06 ASSESSMENT — FIBROSIS 4 INDEX: FIB4 SCORE: 1.38

## 2024-08-06 NOTE — PROGRESS NOTES
"NEUROLOGY FOLLOW-UP - 08/06/2024     REASON FOR VISIT: Sarita Loera 58 y.o. female presents today for follow-up       SUMMARY RELEVANT PAST MEDICAL HISTORY AND/OR COMPENDIUM OF RELEVANT WORK-UP AND TREATMENTS TO DATE:          Nocturnal convulsions  Probable epilepsy  EEG During Nov/23 EMU Stay  Potentially abnormal, although very technically-limited video EEG recording in the awake and drowsy/sleep state(s):  -During drowsiness and sleep, the emergency of intermittent right>left inferior posterior temporal-parietal sharply contoured theta/delta slowing, rarely quasi-rhythmic at 2.5-3.5 Hz.This finding, however, may be artifact.   -One clinical event occurred at 11:45 PM and lasted a few minutes.  Unfortunately from a combination of generalized 60 Hz artifact and issues with connectivity the background EEG during the clinical event was largely unreadable/uninterpretable.  Clinically, patient appeared to wake up appear anxious, right hand started shaking/trembling on and off in a semirhythmic fashion.  She reaches for a cup with the right hand outstretched, but does not grab the cup, her hand extended and still trembling.  Patient motors something but it is not discernible.  Her face appears contorted, right arm stiffening and posturing abnormally, becomes tearful.  My nurses into the room and examined the patient.  She is able to track them and seems to follow commands shortly after they enter. Cannot conclusively say this was a seizure based on its clinical features alone, but a seizure remains a possibility?\"   -She  failed topamax - she had worsening tremors and did not control seizures     Intractable headache/migraines  -s/p once time dose of triptan, medium dose on Day. Will refrain from given patient any more triptans given her higher risk of cardiovascular/cerebraovascular events which can be brought on by triptan use  -toradol IV prn  -IV fluid bolus, IV Magnesium if needed  -topamax added as above " for headache prevention.  Further increasing it from 50 milligrams twice daily to 50 mg/100 mg        Insomnia  -hydroxyzine added 11/08/23   -address pain  -Address anxiety  -Increasing clobazam from 20 to 25 mg per night for better seizure control but will also have a secondary benefit of helping her insomnia     Nicotine Dependence  -smoking cessation counseling  -nicotine gum/patches ordered-patient has not been using     Excessive anxiety, panic attacks, and depressive symptoms  Patient is interested in starting an antidepressant but after discussing with her we will see how she tolerates first a couple changes to the antiseizure medications but will have close follow-up in the outpatient setting to determine if starting an antidepressant remains rate for her     High risk for sleep apnea  -Referral to sleep medicine and for sleep study to be placed upon discharge  -Extensive discussion on the importance of treating this if she does not fact have this diagnosis.  It can have significant impact on her cognition, mood, chronic pain symptoms including and especially headaches, epilepsy, risk for heart attack/stroke, risk for hypertension/diabetes, among others     Abnormal EKG; resting bradycardia; long QT syndrome  -Stop triptans  -Outpatient echo will be ordered.  We will consider stress test and/or referral to cardiology  -Avoid arrhythmia genic medications starting with discontinuation of Lamictal  -We will defer further management to patient's PCP           INTERVAL HISTORY:  Patient returns with he  for follow-up.     We started topamax in the EMU to treat her seizures, migraines, tremors. When she was discharged , she was having bad mood side effects. She had one convulsion early after discharge. She swithced back to her prior med dosing of Lamictal 200 mg ER TID, Onfi 20/40  and ativan prn. She is also tolerating her VNS settings much better as well.  However recently she has had a handful of  "seizures, mostly nocturnal the past coupole of months. She attributes it to stress and worsening insomnia.      Migraines - increased frequency, occurring a couple times/month. Nurtec has worked in the past but she ran out    CURRENT MEDICATIONS AT THE TIME OF THIS ENCOUNTER:  Current Outpatient Medications on File Prior to Visit   Medication Sig Dispense Refill    LamoTRIgine 200 MG TABLET SR 24 HR Take 1 Tablet by mouth in the morning, at noon, and at bedtime for 180 days. 90 Tablet 5    cloBAZam 20 MG Tab Take 60 mg by mouth at bedtime for 180 days. TAKE 3 TABLETS BY MOUTH IN THE EVENING 90 Tablet 5    acetaminophen (TYLENOL) 500 MG Tab Take 500-1,000 mg by mouth every 6 hours as needed. Indications: Pain      B Complex Vitamins (VITAMIN B COMPLEX PO) Take 1 Tablet by mouth every day.      VITAMIN D PO Take 1 Tablet by mouth every day.       No current facility-administered medications on file prior to visit.          EXAM:   /58 (BP Location: Left arm, Patient Position: Sitting, BP Cuff Size: Adult)   Pulse 62   Temp 36.6 °C (97.8 °F) (Temporal)   Ht 1.753 m (5' 9\")   Wt 77.7 kg (171 lb 4.8 oz)   SpO2 94%    Wt Readings from Last 5 Encounters:   08/06/24 77.7 kg (171 lb 4.8 oz)   12/22/23 81.8 kg (180 lb 5.4 oz)   11/10/23 82.2 kg (181 lb 3.5 oz)   09/28/23 80.5 kg (177 lb 7.5 oz)   06/23/23 74.8 kg (165 lb)      Physical Exam:  Physical Exam  Constitutional:       Appearance: She is well-developed.   HENT:      Head: Normocephalic and atraumatic.      Nose: Nose normal.   Eyes:      Pupils: Pupils are equal, round, and reactive to light.   Cardiovascular:      Rate and Rhythm: Normal rate and regular rhythm.   Pulmonary:      Effort: Pulmonary effort is normal.   Musculoskeletal:         General: Normal range of motion.      Cervical back: Normal range of motion.   Skin:     General: Skin is warm.   Neurological:      Mental Status: She is alert.      Motor: Motor strength is normal.  Psychiatric:    "      Mood and Affect: Mood normal.        Neurological Exam   Neurological Exam  Mental Status  Alert. Language is fluent with no aphasia.    Cranial Nerves  CN III, IV, VI: Pupils equal round and reactive to light bilaterally.    Motor   The following abnormal movements were seen: Strength is 5/5 throughout all four extremities.  Head tremor, titubation noted.    Gait  Casual gait is normal including stance, stride, and arm swing.      Settings  COMMMENTS   NORMAL     Output current 0.625 mA    Signal frequency 20 Hz    Pulse width 250 microsec    Signal ON Time 30 sec    Signal OFF Time 10 min -> 5 min    Duty Cycle 5%-> 10%         AUTOSTIMULATOR     Output current NA    Pulse width     On Time     Heart Beat detection     Threshold stimulation (% HR increase)          MAGNET     Output current 0.625 mA    Pulse width 20 Hz    On Time 30    Battery life - %      ASSESSMENT, EDUCATION, AND COUNSELING:  This is a 58 y.o. female patient who presents to the neurology clinic. We had an extensive discussion about the patient's symptoms, signs, and work-up to date, if any. We discussed potential and/or definitive diagnoses, work-up, and potential treatments.     PLAN:  If applicable, the work-up such as labs, imaging, procedures, and/or other testing, referrals, and/or recommended treatment strategies are listed below.    Medications were administered today in clinic (if any):     Visit Diagnoses     ICD-10-CM   1. Nocturnal seizures (HCC)  R56.9   2. Other migraine without status migrainosus, not intractable  G43.809   3. Status post placement of VNS (vagus nerve stimulation) device  Z96.89   4. Cervicalgia  M54.2   5. Cigarette nicotine dependence with nicotine-induced disorder  F17.219   6. Cervical spondylosis  M47.812   7. Sleep disturbance  G47.9   8. At risk for osteopenia  Z91.89   9. Other migraine without status migrainosus, intractable  G43.819   10. Cosme's esophagus with dysplasia  K22.719   11.  Attending and PA/NP shared services statement (NON-critical care): Tremor  R25.1   12. Sleep disorder  G47.9   13. Palpitations  R00.2   14. Anxiety  F41.9   15. Spinal stenosis in cervical region  M48.02   16. At risk for sleep apnea  Z91.89      Orders Placed This Encounter    CBC WITH DIFFERENTIAL    Comp Metabolic Panel    HEMOGLOBIN A1C    TSH    LAMOTRIGINE    Rimegepant Sulfate (NURTEC) 75 MG TABLET DISPERSIBLE        Patient with a history of nocturnal seizures, clinically captured, and for the past few months she has had a few seizures each month, mostly nocturnal and witnessed by  but some she may have had during the day (focal unaware seizures). Increased stress and poor sleep may be playing a role.      Lamictal  mg TID, Onfi 20 mg/40 mg (sometimes she takes 60 mg all at night). She has ativan 0.5 mg BID  as needed but rarely uses it. She should continue to use it as needed.      Rechecked VNS today. Increased duty cycle only. Output was left unchanged as above       Getting updated labs routine labs as above  '  Consider increasing Onfi from 60 mg per night to 80 mg per night for better seizure control and insomnia.     Migraines - refill Nurtec. Take additional tab if needed 2 hours later.    Continue to follow-up on insomnia. Consider adding psychotropic to help with falling asleep            BILLING DOCUMENTATION:     The number of minutes of face-to-face time spent in this encounter was I spent a total of 50 minutes on the day of the visit.  . Over 50% of the time of the visit today was spent on counseling and/or coordination of care wtih the patient and/or family, as outlined above in assessment in plan. 12 of the 50 minutes were spent interrogating the VNS, changes 1 of the settings, and monitoring the patient clinically afterwards    Douglas Charles MD  Department of Neurology at [facility]  Diplomate of the American Board of Psychiatry and Neurology, General Neurology  Diplomate of American Board of Psychiatry and  Neurology, a Member Board of the American Board of Medical Subspecialties, Epilepsy  Director of [facility]'s Level III Comprehensive Epilepsy Program  Professor of Clinical Neurology, [facility].   [address], [address]  [facility] [id number] [phone]   Fax: [phone]  E-mail: [email or web address]     Attending and PA/NP shared services statement (NON-critical care): Attending and PA/NP shared services statement (NON-critical care): Attending and PA/NP shared services statement (NON-critical care): Attending and PA/NP shared services statement (NON-critical care):

## 2024-08-06 NOTE — PATIENT INSTRUCTIONS
NEUROLOGY CLINIC VISIT WITH DR. CHARLES     PLEASE READ THIS ENTIRE DOCUMENT CAREFULLY AND COMPLETELY:    First and foremost, you matter to Dr. Charles and you deserve the best care.   Dr. Charles prides himself on providing the best possible care to all his patients. He strives to make each appointment meaningful, so that all your concerns are being addressed and all your neurological problems are being optimally treated. In order to achieve these goals for everyone, Dr. Charles has listed important reminders and the best ways to prepare for each appointment. Please read each item carefully. Thank you!    Due to the high volume of patients we are trying to help, your physician will not be able to respond by phone or in [facility] to your routine concerns between appointments.  This does not reflect a lack of interest or concern for you or your diagnosis.  Please bring these questions and concerns to your appointment where your physician can answer.  Please relay more pressing concerns to our office, either via [facility], or by phone; if not able to reach us please visit nearby Urgent Care Center or Emergency Department.  If any emergent medical needs, please seek emergent medical help and/or call 911.    Also, please note that we are not able to fill out paperwork that might be related to your work, utility company, disability, and/or driving, among others, in between the visits.  Please schedule a dedicated appointment to address any and all paperwork.  This is not due to lack of concern or interest for your disease-related work/administrative problems, but to make sure that we provide the best possible care and to fill out your paperwork in a correct, complete, and timely manner.  ------------------------------------------------------------------------------------------  Please let our office know if you have any changes in your seizure frequency and/characteristics.     Please keep a diary of your seizures and bring it  with you to each appointment.    Please take vitamin D3 [id number] internation units daily.     Please abstain from driving until further notice    If you are a biological female with epilepsy who is of reproductive age, who is actively breastfeeding, and/or who infants/young children:  Please take folic acid 1 mg daily. This is an over-the-counter supplement that is recommended to prevent certain developmental problems in your baby, in case you become pregnant in the future.  It is critical that you let our office know as soon as you become pregnant or plan to become pregnant.  If you are caring for a baby/young child, please make sure to be sitting on a soft surface while holding your baby/young child, so in case you have a seizure, your baby/young child is not injured due to fall.   Please let us know if, while breastfeeding, you observe that your baby is excessively sleepy and/or has other behavioral changes. Because many antiseizure medications are collected in breast milk, some nursing babies can suffer adverse medication effects.    Please note that the following might precipitate seizures:   missed doses of antiseizure medications  being sick with a fever, stress  Fatigue  sleep deprivation or abnormal sleeping patterns  not eating regularly  not drinking enough water  drinking too much alcohol  stopping alcohol suddenly if you are currently using it on a regular/daily basis,   using recreational drugs, among others.    Please note that the following might lead to an injury or even be life-threatening in the event you have a seizure and/or lose awareness while:  being in a large body of water by yourself, such as bath, pool, lake, ocean, among others (risk of drowning)  being on unprotected heights (risk of fall)  being around and/or operating heavy machinery (risk of injury)  being around open fire/hot surfaces (risk of burns)  any other activities/circumstances, in which if you lose awareness, you might  injure yourself and/or others.  -------------------------------------------------------------------------------------------  SUDEP (SUDDEN UNEXPECTED DEATH IN EPILEPSY)  It is important that your seizures are well controlled and you have none or have them rarely. In addition to avoiding injury related to breakthrough seizures, frequent seizures increase risk of SUDEP (sudden unexpected death in epilepsy), where a person goes into a seizure and then never wakes up. The best way to prevent SUDEP is to control your seizures well.   ------------------------------------------------------------------------------------------  Please call for help (crisis line and/or 911) in case you have thoughts of harming yourself and/or others.  ------------------------------------------------------------------------------------------  INSTRUCTIONS FOR YOUR FAMILY/CAREGIVERS:  Please call 911 if the patient has a seizure longer than 2-3 minutes, if seizures are back to back without her recovering to her baseline, or she does not start recovering within 5-10 minutes after the seizure stops. During the seizure - please turn her on her side, please make sure her head is protected (for example, you should put a pillow under her head, if one is available), and please do not put anything in her mouth.   ------------------------------------------------------------------------------------------  PATIENT EXPECTATIONS,  IMPORTANT APPOINTMENT REMINDERS, AND ADDITIONAL HELPFUL TIPS:   REFILLS:   Request refills AT LEAST 1 week in advance to ensure you do not run out of medications    MyChart  It is STRONGLY encouraged that ALL patients sign up for MyChart. It is BY FAR the fastest and most convenient way for both Dr. Charles and patients to obtain timely refills.  If you are having trouble signing up or logging into your account, staff are available to help you. Please ask a medical assistant or staff at the  to assist you.    TEST RESULS:    All labs and diagnostic test results will be reviewed at your next visit, UNLESS  Dr. Charles determines that there are important findings on the tests need to be acted on sooner. Dr. Charles will either call or send a message through [facility] if this is the case.    BE PREPARED PRIOR TO EVERY APPOINTMENT:  All patient are responsible for ensuring that ALL test results that were completed outside of the liveBooks system have been received by our Neurology Department PRIOR to your appointment with Dr. Charles.    IMPORTANT:  ALL images (not just the reports) must be sent and uploaded to the liveBooks system. Dr. Charles reviews all images personally prior to each visit. Ensuring that ALL the test results and test images are accessible to Dr. Charles prior to your appointment is YOUR responsibility and an important part of making the most out of each appointment.   Bring a government-issues picture ID and an updated insurance card EVERY visit.  It is highly recommended that you bring at every visit a list of the most important topics that you want address. While it may not be possible to address all items on the list in a single visit, preparing a list will ensure that Dr. Charles addresses the items that are most important to you and your health    PAPERWORK, DOCUMENTATION, LETTER REQUESTS:  You must notify the office ahead of your appointment of all paperwork or letter requests.   Please DO NOT wait until the last minute to make these requests. Please give all paperwork to the medical assistant at the start of the appointment and check-in process. Please note that Dr. Charles may not be able complete some types of documentation in a single appointment or even within a single day or week. This is why it is important to communicate paperwork requests prior to your appointment and at least 2 weeks prior to any deadlines.    KNOW ALL YOU MEDICATIONS:   AT EVERY SINGLE APPOINTMENT, please bring a list of every single prescribed,  non-prescribed, and over the counter medication or supplements you are taking, including ones taken on a rare or intermittent basis.  Include the following information for each prescribed or non-prescribed medications:  Name of medication   The strength of EACH pill/capsule/tablet, etc.   The number of pills/capsules/tablets, etc taken per dose  The number and time of day that doses are taken  For every single Supplement that you take on a routine or intermittent basis, you must include:  The Brand Name   A complete list of every single ingredient, compound, vitamin, and/or mineral in each dose, along with the corresponding amounts/strengths of all ingredients, vitamins, minerals, etc., if such information is provided or known  The number of doses taken per day and time of day doses are taken  If medications are taken on an intermittent or as needed basis, please estimate how many days per week or days per month the medications are used  DO NOT just print out your medication list from [facility] or bring a list from a prior appointment or hospitalizations because the information is often often unreliable, inaccurate, outdated, and/or incomplete   The list should be printed or written  If you forget or do not have a list of all the medication, then it is acceptable, although less preferred, to bring all the bottles to the appointment     ARRIVE EARLY FOR ALL VISITS:  Please note that we are unable to accommodate late arrivals as per office policy.  YOU-the patient - (NOT a parent, spouse, or friend) must be physically present at check-in no later than 12 minutes after the scheduled appointment time, or you will be asked to reschedule   Consider scheduling a virtual appointment with Dr. Charles through [facility] as an alternative if transportation to the clinic is difficult or unavailable   Please note, however, that virtual visits can only be scheduled after being an established patient of Dr. Charles. All new appointments  "must be done in-person in clinic  Some insurances will not cover the cost of virtual appointments. Please check with your insurance to find out if these visits are covered    COMMUNICATING URGENT AND NON-URGENT MATTERS:  Your concerns are important and deserve to be heard and addressed. If you have an urgent matter, there are two methods that will ensure your concerns are prioritized appropriately:   Preferred method: Sign-up/Login to your [employer] account and send a message addressed to Dr. Charles or Kellie Galvez (Dr. Charles's assistant). In the subject line, type \"urgent\" followed by a word or phrase describing the situation (For example, write \"Urgent: Out of antiseuzre med and need refill\" or \"Urgent: Severe side effects to new meds\". In doing this, our staff can ensure urgent messages are triaged appropriately and communicated to Dr. Charles that day.  Call [facility] main line at [phone]. Dr. Charles's voicemail extension is [phone]. When leaving a voice message, specifically indicate if it is urgent (or non-urgent) so that the matter can be triaged appropriately and addressed in a timely manner    Thank you for entrusting your neurological care to [facility] and we look forward to continuing to serve you.   "

## 2024-08-06 NOTE — TELEPHONE ENCOUNTER
Received [employer] request via [employer]  for     (NURTEC) 75 MG TABLET DISPERSIBLE   . (Quantity:10, Day Supply:30)     Insurance: [place] [employer] [place]  Member ID:  [id number]  BIN: [id number]  PCN: [id number]  [id number]     Ran Test claim via [place] & medication Rejects stating prior authorization is required.

## 2024-08-06 NOTE — TELEPHONE ENCOUNTER
Prior Authorization for     (NURTEC) 75 MG TABLET DISPERSIBLE    (Quantity: 10, Days: 30) has been submitted via Cover My Meds: Key ([id number])    Insurance: [employer] [employer] F[place]     Will follow up in 24-48 business hours.

## 2024-08-07 NOTE — TELEPHONE ENCOUNTER
Prior Authorization for (NURTEC) 75 MG TABLET DISPERSIBLE  has been approved for a quantity of 10 , day supply 30    Prior Authorization reference number: [id number]  Insurance: [employer] FFS   Effective dates: 08/6/2024 to 8/6/25  Copay: $0     Is patient eligible to fill with [facility] Yes  Next Steps: The Patients copay is less than $5.00. Will contact the patient to determine choice of pharmacy, if applicable.

## 2024-11-01 ENCOUNTER — APPOINTMENT (OUTPATIENT)
Dept: NEUROLOGY | Facility: MEDICAL CENTER | Age: 59
End: 2024-11-01
Attending: STUDENT IN AN ORGANIZED HEALTH CARE EDUCATION/TRAINING PROGRAM
Payer: MEDICAID

## 2024-12-27 DIAGNOSIS — G40.919 INTRACTABLE SEIZURE DISORDER (HCC): ICD-10-CM

## 2024-12-27 DIAGNOSIS — R56.9 NOCTURNAL SEIZURES (HCC): ICD-10-CM

## 2024-12-27 RX ORDER — CLOBAZAM 20 MG/1
60 TABLET ORAL
Qty: 270 TABLET | Refills: 1 | Status: SHIPPED | OUTPATIENT
Start: 2024-12-27 | End: 2025-06-25

## 2024-12-27 RX ORDER — LAMOTRIGINE 200 MG/1
200 TABLET, EXTENDED RELEASE ORAL 3 TIMES DAILY
Qty: 270 TABLET | Refills: 3 | Status: SHIPPED | OUTPATIENT
Start: 2024-12-27 | End: 2025-12-22

## 2024-12-27 NOTE — TELEPHONE ENCOUNTER
Received request via: Pharmacy    Medication Name/Dosage Lamotrigine 200 mg tablet, take 1 tablet by mouth in the morning and 1 at noon and 1 at bedtime     When was medication last prescribed 6/21/24    How many refills were previously provided 0    How many Refills does he patient have left from last prescription 0    Was the patient seen in the last year in this department? Yes   Date of last office visit 8/6/24     Per last Neurology Office Visit, when was the date of next follow up visit set for? 3 months                            Date of office visit follow up request 11/6/24     Does the patient have an upcoming appointment? Yes   If yes, when 1/3/25             If no, schedule appointment Scheduled     Does the patient have detention Plus and need 100 day supply (blood pressure, diabetes and cholesterol meds only)? Patient does not have SCP      Received request via: Pharmacy    Medication Name/Dosage Clobazam 20 mg, take 3 tablets by mouth at bedtime     When was medication last prescribed 6/21/24    How many refills were previously provided 0    How many Refills does he patient have left from last prescription 0    Was the patient seen in the last year in this department? Yes   Date of last office visit 8/6/24     Per last Neurology Office Visit, when was the date of next follow up visit set for?  3 months                          Date of office visit follow up request  11/6/24    Does the patient have an upcoming appointment? Yes   If yes, when 1/3/25             If no, schedule appointment Scheduled     Does the patient have detention Plus and need 100 day supply (blood pressure, diabetes and cholesterol meds only)? Patient does not have SCP

## 2025-01-03 ENCOUNTER — OFFICE VISIT (OUTPATIENT)
Dept: NEUROLOGY | Facility: MEDICAL CENTER | Age: 60
End: 2025-01-03
Attending: STUDENT IN AN ORGANIZED HEALTH CARE EDUCATION/TRAINING PROGRAM
Payer: MEDICAID

## 2025-01-03 VITALS
BODY MASS INDEX: 26.02 KG/M2 | HEIGHT: 69 IN | DIASTOLIC BLOOD PRESSURE: 62 MMHG | WEIGHT: 175.71 LBS | SYSTOLIC BLOOD PRESSURE: 98 MMHG | TEMPERATURE: 97.9 F | RESPIRATION RATE: 12 BRPM | OXYGEN SATURATION: 94 % | HEART RATE: 70 BPM

## 2025-01-03 DIAGNOSIS — Z96.89 STATUS POST PLACEMENT OF VNS (VAGUS NERVE STIMULATION) DEVICE: ICD-10-CM

## 2025-01-03 DIAGNOSIS — M54.2 CERVICALGIA: ICD-10-CM

## 2025-01-03 DIAGNOSIS — G47.00 INSOMNIA, UNSPECIFIED TYPE: ICD-10-CM

## 2025-01-03 DIAGNOSIS — Z91.89 AT RISK FOR OSTEOPENIA: ICD-10-CM

## 2025-01-03 DIAGNOSIS — G40.919 INTRACTABLE SEIZURE DISORDER (HCC): ICD-10-CM

## 2025-01-03 DIAGNOSIS — R56.9 SEIZURES (HCC): ICD-10-CM

## 2025-01-03 DIAGNOSIS — R00.2 PALPITATIONS: ICD-10-CM

## 2025-01-03 DIAGNOSIS — G43.809 OTHER MIGRAINE WITHOUT STATUS MIGRAINOSUS, NOT INTRACTABLE: ICD-10-CM

## 2025-01-03 DIAGNOSIS — K22.719 BARRETT'S ESOPHAGUS WITH DYSPLASIA: ICD-10-CM

## 2025-01-03 DIAGNOSIS — F17.219 CIGARETTE NICOTINE DEPENDENCE WITH NICOTINE-INDUCED DISORDER: ICD-10-CM

## 2025-01-03 DIAGNOSIS — G47.9 SLEEP DISTURBANCE: ICD-10-CM

## 2025-01-03 DIAGNOSIS — M47.812 CERVICAL SPONDYLOSIS: ICD-10-CM

## 2025-01-03 DIAGNOSIS — Z91.89 AT RISK FOR SLEEP APNEA: ICD-10-CM

## 2025-01-03 DIAGNOSIS — G43.819 OTHER MIGRAINE WITHOUT STATUS MIGRAINOSUS, INTRACTABLE: ICD-10-CM

## 2025-01-03 DIAGNOSIS — R25.1 TREMOR: ICD-10-CM

## 2025-01-03 DIAGNOSIS — Z01.89 NEEDS SLEEP APNEA ASSESSMENT: ICD-10-CM

## 2025-01-03 DIAGNOSIS — F41.9 ANXIETY: ICD-10-CM

## 2025-01-03 DIAGNOSIS — R56.9 NOCTURNAL SEIZURES (HCC): ICD-10-CM

## 2025-01-03 DIAGNOSIS — M48.02 SPINAL STENOSIS IN CERVICAL REGION: ICD-10-CM

## 2025-01-03 PROCEDURE — 99214 OFFICE O/P EST MOD 30 MIN: CPT | Performed by: STUDENT IN AN ORGANIZED HEALTH CARE EDUCATION/TRAINING PROGRAM

## 2025-01-03 PROCEDURE — 3078F DIAST BP <80 MM HG: CPT | Performed by: STUDENT IN AN ORGANIZED HEALTH CARE EDUCATION/TRAINING PROGRAM

## 2025-01-03 PROCEDURE — 3074F SYST BP LT 130 MM HG: CPT | Performed by: STUDENT IN AN ORGANIZED HEALTH CARE EDUCATION/TRAINING PROGRAM

## 2025-01-03 PROCEDURE — 99212 OFFICE O/P EST SF 10 MIN: CPT | Performed by: STUDENT IN AN ORGANIZED HEALTH CARE EDUCATION/TRAINING PROGRAM

## 2025-01-03 RX ORDER — LORAZEPAM 0.5 MG/1
0.5 TABLET ORAL 2 TIMES DAILY PRN
Qty: 60 TABLET | Refills: 0 | Status: SHIPPED | OUTPATIENT
Start: 2025-01-03 | End: 2025-02-02

## 2025-01-03 RX ORDER — RIMEGEPANT SULFATE 75 MG/75MG
75 TABLET, ORALLY DISINTEGRATING ORAL PRN
COMMUNITY

## 2025-01-03 RX ORDER — LORAZEPAM 0.5 MG/1
0.5 TABLET ORAL EVERY 4 HOURS PRN
COMMUNITY
End: 2025-01-03 | Stop reason: SDUPTHER

## 2025-01-03 ASSESSMENT — FIBROSIS 4 INDEX: FIB4 SCORE: 1.34

## 2025-01-03 ASSESSMENT — PATIENT HEALTH QUESTIONNAIRE - PHQ9: CLINICAL INTERPRETATION OF PHQ2 SCORE: 0

## 2025-01-03 NOTE — PATIENT INSTRUCTIONS
NEUROLOGY CLINIC VISIT WITH DR. CHARLES     PLEASE READ THIS ENTIRE DOCUMENT CAREFULLY AND COMPLETELY:    First and foremost, you matter to Dr. Charles and you deserve the best care.   Dr. Charles prides himself on providing the best possible care to all his patients. He strives to make each appointment meaningful, so that all your concerns are being addressed and all your neurological problems are being optimally treated. In order to achieve these goals for everyone, Dr. Charles has listed important reminders and the best ways to prepare for each appointment. Please read each item carefully. Thank you!    Due to the high volume of patients we are trying to help, your physician will not be able to respond by phone or in [employer] to your routine concerns between appointments.  This does not reflect a lack of interest or concern for you or your diagnosis.  Please bring these questions and concerns to your appointment where your physician can answer.  Please relay more pressing concerns to our office, either via [employer], or by phone; if not able to reach us please visit nearby Urgent Care Center or Emergency Department.  If any emergent medical needs, please seek emergent medical help and/or call 911.    Also, please note that we are not able to fill out paperwork that might be related to your work, utility company, disability, and/or driving, among others, in between the visits.  Please schedule a dedicated appointment to address any and all paperwork.  This is not due to lack of concern or interest for your disease-related work/administrative problems, but to make sure that we provide the best possible care and to fill out your paperwork in a correct, complete, and timely manner.  ------------------------------------------------------------------------------------------  Please let our office know if you have any changes in your seizure frequency and/characteristics.     Please keep a diary of your seizures and bring it  with you to each appointment.    Please take vitamin D3 [id number] internation units daily.     Please abstain from driving until further notice    If you are a biological female with epilepsy who is of reproductive age, who is actively breastfeeding, and/or who infants/young children:  Please take folic acid 1 mg daily. This is an over-the-counter supplement that is recommended to prevent certain developmental problems in your baby, in case you become pregnant in the future.  It is critical that you let our office know as soon as you become pregnant or plan to become pregnant.  If you are caring for a baby/young child, please make sure to be sitting on a soft surface while holding your baby/young child, so in case you have a seizure, your baby/young child is not injured due to fall.   Please let us know if, while breastfeeding, you observe that your baby is excessively sleepy and/or has other behavioral changes. Because many antiseizure medications are collected in breast milk, some nursing babies can suffer adverse medication effects.    Please note that the following might precipitate seizures:   missed doses of antiseizure medications  being sick with a fever, stress  Fatigue  sleep deprivation or abnormal sleeping patterns  not eating regularly  not drinking enough water  drinking too much alcohol  stopping alcohol suddenly if you are currently using it on a regular/daily basis,   using recreational drugs, among others.    Please note that the following might lead to an injury or even be life-threatening in the event you have a seizure and/or lose awareness while:  being in a large body of water by yourself, such as bath, pool, lake, ocean, among others (risk of drowning)  being on unprotected heights (risk of fall)  being around and/or operating heavy machinery (risk of injury)  being around open fire/hot surfaces (risk of burns)  any other activities/circumstances, in which if you lose awareness, you might  injure yourself and/or others.  -------------------------------------------------------------------------------------------  SUDEP (SUDDEN UNEXPECTED DEATH IN EPILEPSY)  It is important that your seizures are well controlled and you have none or have them rarely. In addition to avoiding injury related to breakthrough seizures, frequent seizures increase risk of SUDEP (sudden unexpected death in epilepsy), where a person goes into a seizure and then never wakes up. The best way to prevent SUDEP is to control your seizures well.   ------------------------------------------------------------------------------------------  Please call for help (crisis line and/or 911) in case you have thoughts of harming yourself and/or others.  ------------------------------------------------------------------------------------------  INSTRUCTIONS FOR YOUR FAMILY/CAREGIVERS:  Please call 911 if the patient has a seizure longer than 2-3 minutes, if seizures are back to back without her recovering to her baseline, or she does not start recovering within 5-10 minutes after the seizure stops. During the seizure - please turn her on her side, please make sure her head is protected (for example, you should put a pillow under her head, if one is available), and please do not put anything in her mouth.   ------------------------------------------------------------------------------------------  PATIENT EXPECTATIONS,  IMPORTANT APPOINTMENT REMINDERS, AND ADDITIONAL HELPFUL TIPS:   REFILLS:   Request refills AT LEAST 1 week in advance to ensure you do not run out of medications    MyChart  It is STRONGLY encouraged that ALL patients sign up for MyChart. It is BY FAR the fastest and most convenient way for both Dr. Charles and patients to obtain timely refills.  If you are having trouble signing up or logging into your account, staff are available to help you. Please ask a medical assistant or staff at the  to assist you.    TEST RESULS:    All labs and diagnostic test results will be reviewed at your next visit, UNLESS  Dr. Charles determines that there are important findings on the tests need to be acted on sooner. Dr. Charles will either call or send a message through [employer] if this is the case.    BE PREPARED PRIOR TO EVERY APPOINTMENT:  All patient are responsible for ensuring that ALL test results that were completed outside of the [facility] system have been received by our Neurology Department PRIOR to your appointment with Dr. Charles.    IMPORTANT:  ALL images (not just the reports) must be sent and uploaded to the [facility] system. Dr. Charles reviews all images personally prior to each visit. Ensuring that ALL the test results and test images are accessible to Dr. Charles prior to your appointment is YOUR responsibility and an important part of making the most out of each appointment.   Bring a government-issues picture ID and an updated insurance card EVERY visit.  It is highly recommended that you bring at every visit a list of the most important topics that you want address. While it may not be possible to address all items on the list in a single visit, preparing a list will ensure that Dr. Charles addresses the items that are most important to you and your health    PAPERWORK, DOCUMENTATION, LETTER REQUESTS:  You must notify the office ahead of your appointment of all paperwork or letter requests.   Please DO NOT wait until the last minute to make these requests. Please give all paperwork to the medical assistant at the start of the appointment and check-in process. Please note that Dr. Charles may not be able complete some types of documentation in a single appointment or even within a single day or week. This is why it is important to communicate paperwork requests prior to your appointment and at least 2 weeks prior to any deadlines.    KNOW ALL YOU MEDICATIONS:   AT EVERY SINGLE APPOINTMENT, please bring a list of every single prescribed,  non-prescribed, and over the counter medication or supplements you are taking, including ones taken on a rare or intermittent basis.  Include the following information for each prescribed or non-prescribed medications:  Name of medication   The strength of EACH pill/capsule/tablet, etc.   The number of pills/capsules/tablets, etc taken per dose  The number and time of day that doses are taken  For every single Supplement that you take on a routine or intermittent basis, you must include:  The Brand Name   A complete list of every single ingredient, compound, vitamin, and/or mineral in each dose, along with the corresponding amounts/strengths of all ingredients, vitamins, minerals, etc., if such information is provided or known  The number of doses taken per day and time of day doses are taken  If medications are taken on an intermittent or as needed basis, please estimate how many days per week or days per month the medications are used  DO NOT just print out your medication list from [employer] or bring a list from a prior appointment or hospitalizations because the information is often often unreliable, inaccurate, outdated, and/or incomplete   The list should be printed or written  If you forget or do not have a list of all the medication, then it is acceptable, although less preferred, to bring all the bottles to the appointment     ARRIVE EARLY FOR ALL VISITS:  Please note that we are unable to accommodate late arrivals as per office policy.  YOU-the patient - (NOT a parent, spouse, or friend) must be physically present at check-in no later than 12 minutes after the scheduled appointment time, or you will be asked to reschedule   Consider scheduling a virtual appointment with Dr. Charles through [employer] as an alternative if transportation to the clinic is difficult or unavailable   Please note, however, that virtual visits can only be scheduled after being an established patient of Dr. Charles. All new appointments  "must be done in-person in clinic  Some insurances will not cover the cost of virtual appointments. Please check with your insurance to find out if these visits are covered    COMMUNICATING URGENT AND NON-URGENT MATTERS:  Your concerns are important and deserve to be heard and addressed. If you have an urgent matter, there are two methods that will ensure your concerns are prioritized appropriately:   Preferred method: Sign-up/Login to your [employer] account and send a message addressed to Dr. Charles or Kellie Galvez (Dr. Charles's assistant). In the subject line, type \"urgent\" followed by a word or phrase describing the situation (For example, write \"Urgent: Out of antiseuzre med and need refill\" or \"Urgent: Severe side effects to new meds\". In doing this, our staff can ensure urgent messages are triaged appropriately and communicated to Dr. Charles that day.  Call [facility] Neu[facility] main line at [phone]. Dr. Charles's voicemail extension is [phone]. When leaving a voice message, specifically indicate if it is urgent (or non-urgent) so that the matter can be triaged appropriately and addressed in a timely manner    Thank you for entrusting your neurological care to [facility] and we look forward to continuing to serve you.   "

## 2025-01-03 NOTE — PROGRESS NOTES
"NEUROLOGY FOLLOW-UP - 01/03/2025     REASON FOR VISIT: Sarita Loera 59 y.o. female presents today for follow-up     SUMMARY RELEVANT PAST MEDICAL HISTORY   Nocturnal convulsions  Probable epilepsy  EEG During Nov/23 EMU Stay  Potentially abnormal, although very technically-limited video EEG recording in the awake and drowsy/sleep state(s):  -During drowsiness and sleep, the emergency of intermittent right>left inferior posterior temporal-parietal sharply contoured theta/delta slowing, rarely quasi-rhythmic at 2.5-3.5 Hz.This finding, however, may be artifact.   -One clinical event occurred at 11:45 PM and lasted a few minutes.  Unfortunately from a combination of generalized 60 Hz artifact and issues with connectivity the background EEG during the clinical event was largely unreadable/uninterpretable.  Clinically, patient appeared to wake up appear anxious, right hand started shaking/trembling on and off in a semirhythmic fashion.  She reaches for a cup with the right hand outstretched, but does not grab the cup, her hand extended and still trembling.  Patient motors something but it is not discernible.  Her face appears contorted, right arm stiffening and posturing abnormally, becomes tearful.  My nurses into the room and examined the patient.  She is able to track them and seems to follow commands shortly after they enter. Cannot conclusively say this was a seizure based on its clinical features alone, but a seizure remains a possibility?\"     I     Clinic Visit August 2024:  Patient returns with he  for follow-up.  We started topamax in the EMU to treat her seizures, migraines, tremors. When she was discharged , she was having bad mood side effects. She had one convulsion early after discharge. She swithced back to her prior med dosing of Lamictal 200 mg ER TID, Onfi 20/40  and ativan prn. She is also tolerating her VNS settings much better as well.  However recently she has had a handful of " seizures, mostly nocturnal the past coupole of months. She attributes it to stress and worsening insomnia.  Migraines - increased frequency, occurring a couple times/month. Nurtec has worked in the past but she ran out  Insomnia - discussed possibility of starting psychotropic medication  VNS duty cycle was increased  Discussed consider increasing Onfi from 60 to 80 mg per day at night for improved seizure control, address insomnia  INTERVAL HISTORY:  Patient with a history of nocturnal seizures, clinically captured, who returns for follow-up. She is overall doing well. She has had a few small nocturnal seizures since our last encounter a 5 months ago, the last one being about a month ago, which is overall fewer than her typical so she is happy about that. She is tolerating high settings of VNS. Sleep remains fair. She feels Onfi continues to help with her insomnia in addition to her seizures. She has only needed to use ativan a few times. Mood is good. She contiues to be under a lot of stress but it has not impacted her seizures as much.    Current AEDs:  Onfi 60 mg per night (often takes it 20 mg AM/40 mg PM too)  Lamictal 24 HR  mg TID  ativan 0.5 mg BID anxiety or seizures    Prior AEDs tried:  Topamax - didn't help seizures and made tremors worse  Fycompa -    Labs done after last visit in August 2024:   Latest Reference Range & Units 08/13/24 07:02   WBC 3.4 - 10.8 x10E3/uL 7.7   RBC 3.77 - 5.28 x10E6/uL 5.46 (H)   Hemoglobin 11.1 - 15.9 g/dL 16.5 (H)   Hematocrit 34.0 - 46.6 % 50.5 (H)   MCV 79 - 97 fL 93   MCH 26.6 - 33.0 pg 30.2   MCHC 31.5 - 35.7 g/dL 32.7   RDW 11.7 - 15.4 % 13.2   Platelet Count 150 - 450 x10E3/uL 247   Immature Cells  CANCELED   Neutrophils-Polys Not Estab. % 59   Neutrophils (Absolute) 1.4 - 7.0 x10E3/uL 4.6   Lymphocytes Not Estab. % 32   Lymphs (Absolute) 0.7 - 3.1 x10E3/uL 2.5   Monocytes Not Estab. % 6   Monos (Absolute) 0.1 - 0.9 x10E3/uL 0.5   Eosinophils Not Estab. % 2  "  Eos (Absolute) 0.0 - 0.4 x10E3/uL 0.2   Basophils Not Estab. % 0   Baso (Absolute) 0.0 - 0.2 x10E3/uL 0.0   Immature Granulocytes Not Estab. % 1   Immature Granulocytes (abs) 0.0 - 0.1 x10E3/uL 0.1   (H): Data is abnormally high     Latest Reference Range & Units 08/13/24 07:02   Glycohemoglobin 4.8 - 5.6 % 5.8 (H)   Lamotrigine 2.0 - 20.0 ug/mL 9.0   (H): Data is abnormally high          CURRENT MEDICATIONS:  Current Outpatient Medications on File Prior to Visit   Medication Sig Dispense Refill    Rimegepant Sulfate (NURTEC) 75 MG TABLET DISPERSIBLE Take 75 mg by mouth as needed (headache).      LamoTRIgine 200 MG TABLET SR 24 HR Take 1 Tablet by mouth in the morning, at noon, and at bedtime for 360 days. 270 Tablet 3    cloBAZam 20 MG Tab Take 60 mg by mouth at bedtime for 180 days. 270 Tablet 1    acetaminophen (TYLENOL) 500 MG Tab Take 500-1,000 mg by mouth every 6 hours as needed. Indications: Pain      B Complex Vitamins (VITAMIN B COMPLEX PO) Take 1 Tablet by mouth every day.      VITAMIN D PO Take 1 Tablet by mouth every day.       No current facility-administered medications on file prior to visit.        EXAM:   BP 98/62 (BP Location: Left arm, Patient Position: Sitting, BP Cuff Size: Adult)   Pulse 70   Temp 36.6 °C (97.9 °F) (Temporal)   Resp 12   Ht 1.753 m (5' 9\")   Wt 79.7 kg (175 lb 11.3 oz)   SpO2 94%    Wt Readings from Last 5 Encounters:   01/03/25 79.7 kg (175 lb 11.3 oz)   08/06/24 77.7 kg (171 lb 4.8 oz)   12/22/23 81.8 kg (180 lb 5.4 oz)   11/10/23 82.2 kg (181 lb 3.5 oz)   09/28/23 80.5 kg (177 lb 7.5 oz)      Physical Exam:  Physical Exam  Constitutional:       Appearance: She is well-developed.   HENT:      Head: Normocephalic and atraumatic.      Nose: Nose normal.   Eyes:      Pupils: Pupils are equal, round, and reactive to light.   Cardiovascular:      Rate and Rhythm: Normal rate and regular rhythm.   Pulmonary:      Effort: Pulmonary effort is normal.   Musculoskeletal:        "  General: Normal range of motion.      Cervical back: Normal range of motion.   Skin:     General: Skin is warm.   Neurological:      Mental Status: She is alert.      Motor: Motor strength is normal.  Psychiatric:         Mood and Affect: Mood normal.        Neurological Exam   Neurological Exam  Mental Status  Alert. Language is fluent with no aphasia.    Cranial Nerves  CN III, IV, VI: Pupils equal round and reactive to light bilaterally.    Motor   The following abnormal movements were seen: Strength is 5/5 throughout all four extremities.  Head tremor, titubation noted.    Gait  Casual gait is normal including stance, stride, and arm swing.       ASSESSMENT, EDUCATION, AND COUNSELING:  This is a 59 y.o. female patient who presents to the neurology clinic. We had an extensive discussion about the patient's symptoms, signs, and work-up to date, if any. We discussed potential and/or definitive diagnoses, work-up, and potential treatments.     PLAN:  Medications administered in today's encounter if applicable:       If applicable, the work-up such as labs, imaging, procedures, and/or other testing, referrals, and/or recommended treatment strategies are listed below.  Orders Placed This Encounter    Rimegepant Sulfate (NURTEC) 75 MG TABLET DISPERSIBLE    DISCONTD: LORazepam (ATIVAN) 0.5 MG Tab    LORazepam (ATIVAN) 0.5 MG Tab     Lab Frequency Next Occurrence         Medication List            Accurate as of January 3, 2025  2:18 PM. If you have any questions, ask your nurse or doctor.                CHANGE how you take these medications        Instructions   LORazepam 0.5 MG Tabs  What changed:   when to take this  reasons to take this  Commonly known as: Ativan  Changed by: Dr. Douglas Charles   Doctor's comments: Request 60 tabs of 0.5 mg to cover 30 days.  Take 1 Tablet by mouth 2 times a day as needed (anxiety, seizures, OR insomnia. NOT TO BE USED ON A DAILY BASIS) for up to 30 days.  Dose: 0.5 mg             CONTINUE taking these medications        Instructions   acetaminophen 500 MG Tabs  Commonly known as: Tylenol   Take 500-1,000 mg by mouth every 6 hours as needed. Indications: Pain  Dose: 500-1,000 mg     cloBAZam 20 MG Tabs   Doctor's comments: Request 270 tabs of 20 mg to cover 90 days plus 1 refills for a total coverage of 180 days.  Take 60 mg by mouth at bedtime for 180 days.  Dose: 60 mg     LamoTRIgine 200 MG Tb24   Take 1 Tablet by mouth in the morning, at noon, and at bedtime for 360 days.  Dose: 200 mg     Nurtec 75 MG Tbdp  Generic drug: Rimegepant Sulfate   Take 75 mg by mouth as needed (headache).  Dose: 75 mg     VITAMIN B COMPLEX PO   Take 1 Tablet by mouth every day.  Dose: 1 Tablet     VITAMIN D PO   Take 1 Tablet by mouth every day.  Dose: 1 Tablet               Patient with a history of nocturnal seizures, clinically captured, who returns for follow-up. Patient is overall doing slightly better and she is content with her medication regimen as it is and VNS settings as it is. She does not want to make any changes, I already refilled Onfi and Lamictal last week so these were not filled today. I refilled ativan 0.5 mg to be used up to twice per day as needed for seizures, anxiety, or insomnia.    Will plan to check VNS next visit.    Follow-up in 4 months at which time we will get updated blood work then.      BILLING DOCUMENTATION:     The number of minutes of face-to-face time spent in this encounter was I spent a total of 30 minutes on the day of the visit.  . Over 50% of the time of the visit today was spent on counseling and/or coordination of care wtih the patient and/or family, as outlined above in assessment in plan.    Douglas Charles MD  Department of Neurology at [facility]  Diplomate of the American Board of Psychiatry and Neurology, General Neurology  Diplomate of American Board of Psychiatry and Neurology, a Member Board of the American Board of Medical  Subspecialties, Epilepsy  Director of [facility]'s Level III Comprehensive Epilepsy Program  Professor of Clinical Neurology, [facility].   [address], [address]  [facility] [id number] [phone]   Fax: [phone]  E-mail: [email or web address]

## 2025-04-10 ENCOUNTER — HOSPITAL ENCOUNTER (OUTPATIENT)
Dept: RADIOLOGY | Facility: MEDICAL CENTER | Age: 60
End: 2025-04-10
Attending: STUDENT IN AN ORGANIZED HEALTH CARE EDUCATION/TRAINING PROGRAM
Payer: MEDICAID

## 2025-04-10 ENCOUNTER — OFFICE VISIT (OUTPATIENT)
Dept: NEUROLOGY | Facility: MEDICAL CENTER | Age: 60
End: 2025-04-10
Attending: STUDENT IN AN ORGANIZED HEALTH CARE EDUCATION/TRAINING PROGRAM
Payer: MEDICAID

## 2025-04-10 ENCOUNTER — HOSPITAL ENCOUNTER (OUTPATIENT)
Dept: LAB | Facility: MEDICAL CENTER | Age: 60
End: 2025-04-10
Attending: STUDENT IN AN ORGANIZED HEALTH CARE EDUCATION/TRAINING PROGRAM
Payer: MEDICAID

## 2025-04-10 VITALS
TEMPERATURE: 97.9 F | SYSTOLIC BLOOD PRESSURE: 120 MMHG | OXYGEN SATURATION: 75 % | HEART RATE: 94 BPM | DIASTOLIC BLOOD PRESSURE: 68 MMHG | HEIGHT: 69 IN | BODY MASS INDEX: 25.83 KG/M2 | WEIGHT: 174.38 LBS

## 2025-04-10 DIAGNOSIS — G40.919 INTRACTABLE SEIZURE DISORDER (HCC): ICD-10-CM

## 2025-04-10 DIAGNOSIS — Z96.89 STATUS POST PLACEMENT OF VNS (VAGUS NERVE STIMULATION) DEVICE: ICD-10-CM

## 2025-04-10 DIAGNOSIS — M54.2 CERVICALGIA: ICD-10-CM

## 2025-04-10 DIAGNOSIS — R52 PAIN: ICD-10-CM

## 2025-04-10 DIAGNOSIS — R25.1 TREMOR: ICD-10-CM

## 2025-04-10 DIAGNOSIS — G47.9 SLEEP DISORDER: ICD-10-CM

## 2025-04-10 DIAGNOSIS — R56.9 NOCTURNAL SEIZURES (HCC): ICD-10-CM

## 2025-04-10 DIAGNOSIS — R07.9 CHEST PAIN, UNSPECIFIED TYPE: ICD-10-CM

## 2025-04-10 DIAGNOSIS — G43.809 OTHER MIGRAINE WITHOUT STATUS MIGRAINOSUS, NOT INTRACTABLE: ICD-10-CM

## 2025-04-10 DIAGNOSIS — Z91.89 AT RISK FOR OSTEOPENIA: ICD-10-CM

## 2025-04-10 DIAGNOSIS — R07.89 CHEST WALL TENDERNESS: ICD-10-CM

## 2025-04-10 DIAGNOSIS — F17.219 CIGARETTE NICOTINE DEPENDENCE WITH NICOTINE-INDUCED DISORDER: ICD-10-CM

## 2025-04-10 LAB
25(OH)D3 SERPL-MCNC: 53 NG/ML (ref 30–100)
ALBUMIN SERPL BCP-MCNC: 4.6 G/DL (ref 3.2–4.9)
ALBUMIN/GLOB SERPL: 1.6 G/DL
ALP SERPL-CCNC: 87 U/L (ref 30–99)
ALT SERPL-CCNC: 18 U/L (ref 2–50)
ANION GAP SERPL CALC-SCNC: 11 MMOL/L (ref 7–16)
AST SERPL-CCNC: 22 U/L (ref 12–45)
BASOPHILS # BLD AUTO: 0.5 % (ref 0–1.8)
BASOPHILS # BLD: 0.04 K/UL (ref 0–0.12)
BILIRUB SERPL-MCNC: 0.3 MG/DL (ref 0.1–1.5)
BUN SERPL-MCNC: 15 MG/DL (ref 8–22)
CALCIUM ALBUM COR SERPL-MCNC: 9.4 MG/DL (ref 8.5–10.5)
CALCIUM SERPL-MCNC: 9.9 MG/DL (ref 8.5–10.5)
CHLORIDE SERPL-SCNC: 103 MMOL/L (ref 96–112)
CO2 SERPL-SCNC: 26 MMOL/L (ref 20–33)
CREAT SERPL-MCNC: 0.72 MG/DL (ref 0.5–1.4)
EOSINOPHIL # BLD AUTO: 0.1 K/UL (ref 0–0.51)
EOSINOPHIL NFR BLD: 1.2 % (ref 0–6.9)
ERYTHROCYTE [DISTWIDTH] IN BLOOD BY AUTOMATED COUNT: 49.1 FL (ref 35.9–50)
GFR SERPLBLD CREATININE-BSD FMLA CKD-EPI: 95 ML/MIN/1.73 M 2
GLOBULIN SER CALC-MCNC: 2.8 G/DL (ref 1.9–3.5)
GLUCOSE SERPL-MCNC: 84 MG/DL (ref 65–99)
HCT VFR BLD AUTO: 49.4 % (ref 37–47)
HGB BLD-MCNC: 16 G/DL (ref 12–16)
IMM GRANULOCYTES # BLD AUTO: 0.02 K/UL (ref 0–0.11)
IMM GRANULOCYTES NFR BLD AUTO: 0.2 % (ref 0–0.9)
LYMPHOCYTES # BLD AUTO: 2.62 K/UL (ref 1–4.8)
LYMPHOCYTES NFR BLD: 32 % (ref 22–41)
MCH RBC QN AUTO: 30.1 PG (ref 27–33)
MCHC RBC AUTO-ENTMCNC: 32.4 G/DL (ref 32.2–35.5)
MCV RBC AUTO: 93 FL (ref 81.4–97.8)
MONOCYTES # BLD AUTO: 0.54 K/UL (ref 0–0.85)
MONOCYTES NFR BLD AUTO: 6.6 % (ref 0–13.4)
NEUTROPHILS # BLD AUTO: 4.87 K/UL (ref 1.82–7.42)
NEUTROPHILS NFR BLD: 59.5 % (ref 44–72)
NRBC # BLD AUTO: 0 K/UL
NRBC BLD-RTO: 0 /100 WBC (ref 0–0.2)
PLATELET # BLD AUTO: 253 K/UL (ref 164–446)
PMV BLD AUTO: 8.9 FL (ref 9–12.9)
POTASSIUM SERPL-SCNC: 4.1 MMOL/L (ref 3.6–5.5)
PROT SERPL-MCNC: 7.4 G/DL (ref 6–8.2)
RBC # BLD AUTO: 5.31 M/UL (ref 4.2–5.4)
SODIUM SERPL-SCNC: 140 MMOL/L (ref 135–145)
WBC # BLD AUTO: 8.2 K/UL (ref 4.8–10.8)

## 2025-04-10 PROCEDURE — 3074F SYST BP LT 130 MM HG: CPT | Performed by: STUDENT IN AN ORGANIZED HEALTH CARE EDUCATION/TRAINING PROGRAM

## 2025-04-10 PROCEDURE — 71046 X-RAY EXAM CHEST 2 VIEWS: CPT

## 2025-04-10 PROCEDURE — 80053 COMPREHEN METABOLIC PANEL: CPT

## 2025-04-10 PROCEDURE — 99213 OFFICE O/P EST LOW 20 MIN: CPT | Performed by: STUDENT IN AN ORGANIZED HEALTH CARE EDUCATION/TRAINING PROGRAM

## 2025-04-10 PROCEDURE — 3078F DIAST BP <80 MM HG: CPT | Performed by: STUDENT IN AN ORGANIZED HEALTH CARE EDUCATION/TRAINING PROGRAM

## 2025-04-10 PROCEDURE — 36415 COLL VENOUS BLD VENIPUNCTURE: CPT

## 2025-04-10 PROCEDURE — 99212 OFFICE O/P EST SF 10 MIN: CPT | Performed by: STUDENT IN AN ORGANIZED HEALTH CARE EDUCATION/TRAINING PROGRAM

## 2025-04-10 PROCEDURE — 82306 VITAMIN D 25 HYDROXY: CPT

## 2025-04-10 PROCEDURE — 85025 COMPLETE CBC W/AUTO DIFF WBC: CPT

## 2025-04-10 ASSESSMENT — FIBROSIS 4 INDEX: FIB4 SCORE: 1.34

## 2025-04-10 ASSESSMENT — PATIENT HEALTH QUESTIONNAIRE - PHQ9: CLINICAL INTERPRETATION OF PHQ2 SCORE: 0

## 2025-04-10 NOTE — PROGRESS NOTES
"NEUROLOGY FOLLOW-UP - 04/10/2025     REASON FOR VISIT: Sarita Loera 59 y.o. female presents today for follow-up     SUMMARY RELEVANT PAST MEDICAL HISTORY   Nocturnal convulsions  Probable epilepsy  EEG During Nov/23 EMU Stay  Potentially abnormal, although very technically-limited video EEG recording in the awake and drowsy/sleep state(s):  -During drowsiness and sleep, the emergency of intermittent right>left inferior posterior temporal-parietal sharply contoured theta/delta slowing, rarely quasi-rhythmic at 2.5-3.5 Hz.This finding, however, may be artifact.   -One clinical event occurred at 11:45 PM and lasted a few minutes.  Unfortunately from a combination of generalized 60 Hz artifact and issues with connectivity the background EEG during the clinical event was largely unreadable/uninterpretable.  Clinically, patient appeared to wake up appear anxious, right hand started shaking/trembling on and off in a semirhythmic fashion.  She reaches for a cup with the right hand outstretched, but does not grab the cup, her hand extended and still trembling.  Patient motors something but it is not discernible.  Her face appears contorted, right arm stiffening and posturing abnormally, becomes tearful.  My nurses into the room and examined the patient.  She is able to track them and seems to follow commands shortly after they enter. Cannot conclusively say this was a seizure based on its clinical features alone, but a seizure remains a possibility?\"     I     Clinic Visit August 2024:  Patient returns with he  for follow-up.  We started topamax in the EMU to treat her seizures, migraines, tremors. When she was discharged , she was having bad mood side effects. She had one convulsion early after discharge. She swithced back to her prior med dosing of Lamictal 200 mg ER TID, Onfi 20/40  and ativan prn. She is also tolerating her VNS settings much better as well.  However recently she has had a handful of " seizures, mostly nocturnal the past coupole of months. She attributes it to stress and worsening insomnia.  Migraines - increased frequency, occurring a couple times/month. Nurtec has worked in the past but she ran out  Insomnia - discussed possibility of starting psychotropic medication  VNS duty cycle was increased  Discussed consider increasing Onfi from 60 to 80 mg per day at night for improved seizure control, address insomnia        INTERVAL HISTORY:  Patient with a history of nocturnal seizures, clinically captured, who returns for follow-up.     She reports that her seizures are still occurring at about the same frequency. 1-2 every couple of months or so. She is happy with her medication regimen although she is not sure if lamcital has helped her or is helping her.    She reports chest tenderness next to her medial left clavicle, just deep to or inferior to VNS. She says it has been getting worse for several days.     No trauma. NO fall. Denies fever, chills or night sweats. No hoarseness or dysphagia.    Patient with slightly worsening tremors L>R hand and head/neck tremor.    CURRENT MEDICATIONS:  Current Outpatient Medications on File Prior to Visit   Medication Sig Dispense Refill    Rimegepant Sulfate (NURTEC) 75 MG TABLET DISPERSIBLE Take 75 mg by mouth as needed (headache).      LamoTRIgine 200 MG TABLET SR 24 HR Take 1 Tablet by mouth in the morning, at noon, and at bedtime for 360 days. 270 Tablet 3    cloBAZam 20 MG Tab Take 60 mg by mouth at bedtime for 180 days. 270 Tablet 1    acetaminophen (TYLENOL) 500 MG Tab Take 500-1,000 mg by mouth every 6 hours as needed. Indications: Pain      B Complex Vitamins (VITAMIN B COMPLEX PO) Take 1 Tablet by mouth every day.      VITAMIN D PO Take 1 Tablet by mouth every day.       No current facility-administered medications on file prior to visit.        EXAM:   /68 (BP Location: Left arm, Patient Position: Sitting, BP Cuff Size: Adult)   Pulse 94    "Temp 36.6 °C (97.9 °F) (Temporal)   Ht 1.753 m (5' 9\")   Wt 79.1 kg (174 lb 6.1 oz)   SpO2 (!) 75%    Wt Readings from Last 5 Encounters:   04/10/25 79.1 kg (174 lb 6.1 oz)   01/03/25 79.7 kg (175 lb 11.3 oz)   08/06/24 77.7 kg (171 lb 4.8 oz)   12/22/23 81.8 kg (180 lb 5.4 oz)   11/10/23 82.2 kg (181 lb 3.5 oz)      Physical Exam:  Physical Exam  Eyes:      Pupils: Pupils are equal, round, and reactive to light.   Musculoskeletal:      Comments: Hard bony swelling of the left chest and left clavicle. + tenderness   Neurological:      Mental Status: She is alert.      Motor: Motor strength is normal.       Neurological Exam   Neurological Exam  Mental Status  Alert. Language is fluent with no aphasia.    Cranial Nerves  CN III, IV, VI: Pupils equal round and reactive to light bilaterally.    Motor   The following abnormal movements were seen: Strength is 5/5 throughout all four extremities.  Head tremor, titubation noted.    Gait  Casual gait is normal including stance, stride, and arm swing.       ASSESSMENT, EDUCATION, AND COUNSELING:  This is a 59 y.o. female patient who presents to the neurology clinic. We had an extensive discussion about the patient's symptoms, signs, and work-up to date, if any. We discussed potential and/or definitive diagnoses, work-up, and potential treatments.     PLAN:  Medications administered in today's encounter if applicable:       If applicable, the work-up such as labs, imaging, procedures, and/or other testing, referrals, and/or recommended treatment strategies are listed below.  Orders Placed This Encounter    DX-CHEST-2 VIEWS    CBC WITH DIFFERENTIAL    Comp Metabolic Panel    VITAMIN D,25 HYDROXY (DEFICIENCY)    Referral to ENT     Lab Frequency Next Occurrence         Medication List            Accurate as of April 10, 2025  1:58 PM. If you have any questions, ask your nurse or doctor.                CONTINUE taking these medications        Instructions   acetaminophen 500 " MG Tabs  Commonly known as: Tylenol   Take 500-1,000 mg by mouth every 6 hours as needed. Indications: Pain  Dose: 500-1,000 mg     cloBAZam 20 MG Tabs   Doctor's comments: Request 270 tabs of 20 mg to cover 90 days plus 1 refills for a total coverage of 180 days.  Take 60 mg by mouth at bedtime for 180 days.  Dose: 60 mg     LamoTRIgine 200 MG Tb24   Take 1 Tablet by mouth in the morning, at noon, and at bedtime for 360 days.  Dose: 200 mg     Nurtec 75 MG Tbdp  Generic drug: Rimegepant Sulfate   Take 75 mg by mouth as needed (headache).  Dose: 75 mg     VITAMIN B COMPLEX PO   Take 1 Tablet by mouth every day.  Dose: 1 Tablet     VITAMIN D PO   Take 1 Tablet by mouth every day.  Dose: 1 Tablet               Patient with seizure disorder s/p VNS who has new chest wall tenderness and a notably hard, tender bony protuberance of the left chest wall adjacent  to VNS generator. Recommend CXR to further evaluate. I am also placing referral to ENT to evaluate as well.    I do not recommend any changes to her medications right now as we do with her more acute symptoms;.     Follow-up in 3 months      BILLING DOCUMENTATION:     The number of minutes of face-to-face time spent in this encounter was I spent a total of 20 minutes on the day of the visit.  . Over 50% of the time of the visit today was spent on counseling and/or coordination of care wtih the patient and/or family, as outlined above in assessment in plan.    Douglas Charles MD  Department of Neurology at [facility]  Diplomate of the American Board of Psychiatry and Neurology, General Neurology  Diplomate of American Board of Psychiatry and Neurology, a Member Board of the American Board of Medical Subspecialties, Epilepsy  Director of [facility] Level III Comprehensive Epilepsy Program  Professor of Clinical Neurology, [facility].   [address], [address]  [facility] [address] [phone]   Fax: [phone]  E-mail: [email or web address]

## 2025-04-10 NOTE — PATIENT INSTRUCTIONS
NEUROLOGY CLINIC VISIT WITH DR. CHARLES     PLEASE READ THIS ENTIRE DOCUMENT CAREFULLY AND COMPLETELY:    First and foremost, you matter to Dr. Charles and you deserve the best care.   Dr. Charles prides himself on providing the best possible care to all his patients. He strives to make each appointment meaningful, so that all your concerns are being addressed and all your neurological problems are being optimally treated. In order to achieve these goals for everyone, Dr. Charles has listed important reminders and the best ways to prepare for each appointment. Please read each item carefully. Thank you!    Due to the high volume of patients we are trying to help, your physician will not be able to respond by phone or in [employer] to your routine concerns between appointments.  This does not reflect a lack of interest or concern for you or your diagnosis.  Please bring these questions and concerns to your appointment where your physician can answer.  Please relay more pressing concerns to our office, either via [employer], or by phone; if not able to reach us please visit nearby Urgent Care Center or Emergency Department.  If any emergent medical needs, please seek emergent medical help and/or call 911.    Also, please note that we are not able to fill out paperwork that might be related to your work, utility company, disability, and/or driving, among others, in between the visits.  Please schedule a dedicated appointment to address any and all paperwork.  This is not due to lack of concern or interest for your disease-related work/administrative problems, but to make sure that we provide the best possible care and to fill out your paperwork in a correct, complete, and timely manner.  ------------------------------------------------------------------------------------------  Please let our office know if you have any changes in your seizure frequency and/characteristics.     Please keep a diary of your seizures and bring it  with you to each appointment.    Please take vitamin D3 8512-6800 internation units daily.     Please abstain from driving until further notice    If you are a biological female with epilepsy who is of reproductive age, who is actively breastfeeding, and/or who infants/young children:  Please take folic acid 1 mg daily. This is an over-the-counter supplement that is recommended to prevent certain developmental problems in your baby, in case you become pregnant in the future.  It is critical that you let our office know as soon as you become pregnant or plan to become pregnant.  If you are caring for a baby/young child, please make sure to be sitting on a soft surface while holding your baby/young child, so in case you have a seizure, your baby/young child is not injured due to fall.   Please let us know if, while breastfeeding, you observe that your baby is excessively sleepy and/or has other behavioral changes. Because many antiseizure medications are collected in breast milk, some nursing babies can suffer adverse medication effects.    Please note that the following might precipitate seizures:   missed doses of antiseizure medications  being sick with a fever, stress  Fatigue  sleep deprivation or abnormal sleeping patterns  not eating regularly  not drinking enough water  drinking too much alcohol  stopping alcohol suddenly if you are currently using it on a regular/daily basis,   using recreational drugs, among others.    Please note that the following might lead to an injury or even be life-threatening in the event you have a seizure and/or lose awareness while:  being in a large body of water by yourself, such as bath, pool, lake, ocean, among others (risk of drowning)  being on unprotected heights (risk of fall)  being around and/or operating heavy machinery (risk of injury)  being around open fire/hot surfaces (risk of burns)  any other activities/circumstances, in which if you lose awareness, you might  injure yourself and/or others.  -------------------------------------------------------------------------------------------  SUDEP (SUDDEN UNEXPECTED DEATH IN EPILEPSY)  It is important that your seizures are well controlled and you have none or have them rarely. In addition to avoiding injury related to breakthrough seizures, frequent seizures increase risk of SUDEP (sudden unexpected death in epilepsy), where a person goes into a seizure and then never wakes up. The best way to prevent SUDEP is to control your seizures well.   ------------------------------------------------------------------------------------------  Please call for help (crisis line and/or 911) in case you have thoughts of harming yourself and/or others.  ------------------------------------------------------------------------------------------  INSTRUCTIONS FOR YOUR FAMILY/CAREGIVERS:  Please call 911 if the patient has a seizure longer than 2-3 minutes, if seizures are back to back without her recovering to her baseline, or she does not start recovering within 5-10 minutes after the seizure stops. During the seizure - please turn her on her side, please make sure her head is protected (for example, you should put a pillow under her head, if one is available), and please do not put anything in her mouth.   ------------------------------------------------------------------------------------------  PATIENT EXPECTATIONS,  IMPORTANT APPOINTMENT REMINDERS, AND ADDITIONAL HELPFUL TIPS:   REFILLS:   Request refills AT LEAST 1 week in advance to ensure you do not run out of medications    MyChart  It is STRONGLY encouraged that ALL patients sign up for MyChart. It is BY FAR the fastest and most convenient way for both Dr. Charles and patients to obtain timely refills.  If you are having trouble signing up or logging into your account, staff are available to help you. Please ask a medical assistant or staff at the  to assist you.    TEST RESULS:    All labs and diagnostic test results will be reviewed at your next visit, UNLESS  Dr. Charles determines that there are important findings on the tests need to be acted on sooner. Dr. Charles will either call or send a message through [employer] if this is the case.    BE PREPARED PRIOR TO EVERY APPOINTMENT:  All patient are responsible for ensuring that ALL test results that were completed outside of the [employer] system have been received by our Neurology Department PRIOR to your appointment with Dr. Charles.    IMPORTANT:  ALL images (not just the reports) must be sent and uploaded to the [employer] system. Dr. Charles reviews all images personally prior to each visit. Ensuring that ALL the test results and test images are accessible to Dr. Charles prior to your appointment is YOUR responsibility and an important part of making the most out of each appointment.   Bring a government-issues picture ID and an updated insurance card EVERY visit.  It is highly recommended that you bring at every visit a list of the most important topics that you want address. While it may not be possible to address all items on the list in a single visit, preparing a list will ensure that Dr. Charles addresses the items that are most important to you and your health    PAPERWORK, DOCUMENTATION, LETTER REQUESTS:  You must notify the office ahead of your appointment of all paperwork or letter requests.   Please DO NOT wait until the last minute to make these requests. Please give all paperwork to the medical assistant at the start of the appointment and check-in process. Please note that Dr. Charles may not be able complete some types of documentation in a single appointment or even within a single day or week. This is why it is important to communicate paperwork requests prior to your appointment and at least 2 weeks prior to any deadlines.    KNOW ALL YOU MEDICATIONS:   AT EVERY SINGLE APPOINTMENT, please bring a list of every single prescribed,  non-prescribed, and over the counter medication or supplements you are taking, including ones taken on a rare or intermittent basis.  Include the following information for each prescribed or non-prescribed medications:  Name of medication   The strength of EACH pill/capsule/tablet, etc.   The number of pills/capsules/tablets, etc taken per dose  The number and time of day that doses are taken  For every single Supplement that you take on a routine or intermittent basis, you must include:  The Brand Name   A complete list of every single ingredient, compound, vitamin, and/or mineral in each dose, along with the corresponding amounts/strengths of all ingredients, vitamins, minerals, etc., if such information is provided or known  The number of doses taken per day and time of day doses are taken  If medications are taken on an intermittent or as needed basis, please estimate how many days per week or days per month the medications are used  DO NOT just print out your medication list from [employer] or bring a list from a prior appointment or hospitalizations because the information is often often unreliable, inaccurate, outdated, and/or incomplete   The list should be printed or written  If you forget or do not have a list of all the medication, then it is acceptable, although less preferred, to bring all the bottles to the appointment     ARRIVE EARLY FOR ALL VISITS:  Please note that we are unable to accommodate late arrivals as per office policy.  YOU-the patient - (NOT a parent, spouse, or friend) must be physically present at check-in no later than 12 minutes after the scheduled appointment time, or you will be asked to reschedule   Consider scheduling a virtual appointment with Dr. Charles through [employer] as an alternative if transportation to the clinic is difficult or unavailable   Please note, however, that virtual visits can only be scheduled after being an established patient of Dr. Charles. All new appointments  "must be done in-person in clinic  Some insurances will not cover the cost of virtual appointments. Please check with your insurance to find out if these visits are covered    COMMUNICATING URGENT AND NON-URGENT MATTERS:  Your concerns are important and deserve to be heard and addressed. If you have an urgent matter, there are two methods that will ensure your concerns are prioritized appropriately:   Preferred method: Sign-up/Login to your [employer] account and send a message addressed to Dr. Charles or Kellie Galvez (Dr. Charles's assistant). In the subject line, type \"urgent\" followed by a word or phrase describing the situation (For example, write \"Urgent: Out of antiseuzre med and need refill\" or \"Urgent: Severe side effects to new meds\". In doing this, our staff can ensure urgent messages are triaged appropriately and communicated to Dr. Charles that day.  Call [facility] main line at [phone]. Dr. Charles's voicemail extension is [phone]. When leaving a voice message, specifically indicate if it is urgent (or non-urgent) so that the matter can be triaged appropriately and addressed in a timely manner    Thank you for entrusting your neurological care to [facility] and we look forward to continuing to serve you.   "

## 2025-06-24 DIAGNOSIS — R56.9 NOCTURNAL SEIZURES (HCC): ICD-10-CM

## 2025-06-24 DIAGNOSIS — G40.919 INTRACTABLE SEIZURE DISORDER (HCC): ICD-10-CM

## 2025-06-25 NOTE — TELEPHONE ENCOUNTER
Received request via: Pharmacy    Medication Name/Dosage Clobazam 20 MG    When was medication last prescribed 12/27/2025    How many refills were previously provided 1    How many Refills does he patient have left from last prescription 0    Was the patient seen in the last year in this department? Yes   Date of last office visit 04/10/2025     Per last Neurology Office Visit, when was the date of next follow up visit set for?                            Date of office visit follow up request 3 months     Does the patient have an upcoming appointment? Yes   If yes, when 08/13/25             If no, schedule appointment done    Does the patient have [facility] Plus and need 100 day supply (blood pressure, diabetes and cholesterol meds only)? Patient does not have SCP

## 2025-06-26 RX ORDER — CLOBAZAM 20 MG/1
3 TABLET ORAL
Qty: 90 TABLET | Refills: 5 | Status: SHIPPED | OUTPATIENT
Start: 2025-06-26 | End: 2025-12-23

## 2025-08-11 ENCOUNTER — APPOINTMENT (OUTPATIENT)
Dept: NEUROLOGY | Facility: MEDICAL CENTER | Age: 60
End: 2025-08-11
Attending: STUDENT IN AN ORGANIZED HEALTH CARE EDUCATION/TRAINING PROGRAM
Payer: MEDICAID

## 2025-08-13 ENCOUNTER — OFFICE VISIT (OUTPATIENT)
Dept: NEUROLOGY | Facility: MEDICAL CENTER | Age: 60
End: 2025-08-13
Attending: STUDENT IN AN ORGANIZED HEALTH CARE EDUCATION/TRAINING PROGRAM
Payer: MEDICAID

## 2025-08-13 ENCOUNTER — SPECIALTY PHARMACY (OUTPATIENT)
Dept: PHARMACY | Facility: MEDICAL CENTER | Age: 60
End: 2025-08-13

## 2025-08-13 VITALS
WEIGHT: 176.59 LBS | TEMPERATURE: 98.3 F | BODY MASS INDEX: 26.16 KG/M2 | SYSTOLIC BLOOD PRESSURE: 104 MMHG | OXYGEN SATURATION: 97 % | DIASTOLIC BLOOD PRESSURE: 56 MMHG | HEART RATE: 61 BPM | HEIGHT: 69 IN

## 2025-08-13 DIAGNOSIS — G43.819 OTHER MIGRAINE WITHOUT STATUS MIGRAINOSUS, INTRACTABLE: ICD-10-CM

## 2025-08-13 DIAGNOSIS — G40.919 INTRACTABLE SEIZURE DISORDER (HCC): ICD-10-CM

## 2025-08-13 DIAGNOSIS — K22.719 BARRETT'S ESOPHAGUS WITH DYSPLASIA: ICD-10-CM

## 2025-08-13 DIAGNOSIS — G47.9 SLEEP DISTURBANCE: ICD-10-CM

## 2025-08-13 DIAGNOSIS — M54.2 CERVICALGIA: ICD-10-CM

## 2025-08-13 DIAGNOSIS — M47.812 CERVICAL SPONDYLOSIS: ICD-10-CM

## 2025-08-13 DIAGNOSIS — R07.89 CHEST WALL TENDERNESS: ICD-10-CM

## 2025-08-13 DIAGNOSIS — R56.9 NOCTURNAL SEIZURES (HCC): Primary | ICD-10-CM

## 2025-08-13 DIAGNOSIS — R56.9 SEIZURES (HCC): ICD-10-CM

## 2025-08-13 DIAGNOSIS — F17.219 CIGARETTE NICOTINE DEPENDENCE WITH NICOTINE-INDUCED DISORDER: ICD-10-CM

## 2025-08-13 DIAGNOSIS — F41.9 ANXIETY: ICD-10-CM

## 2025-08-13 DIAGNOSIS — G47.00 INSOMNIA, UNSPECIFIED TYPE: ICD-10-CM

## 2025-08-13 DIAGNOSIS — Z96.89 STATUS POST PLACEMENT OF VNS (VAGUS NERVE STIMULATION) DEVICE: ICD-10-CM

## 2025-08-13 DIAGNOSIS — M48.02 SPINAL STENOSIS IN CERVICAL REGION: ICD-10-CM

## 2025-08-13 DIAGNOSIS — G25.0 ESSENTIAL TREMOR: ICD-10-CM

## 2025-08-13 PROCEDURE — 3078F DIAST BP <80 MM HG: CPT | Performed by: STUDENT IN AN ORGANIZED HEALTH CARE EDUCATION/TRAINING PROGRAM

## 2025-08-13 PROCEDURE — 99213 OFFICE O/P EST LOW 20 MIN: CPT | Performed by: STUDENT IN AN ORGANIZED HEALTH CARE EDUCATION/TRAINING PROGRAM

## 2025-08-13 PROCEDURE — 99215 OFFICE O/P EST HI 40 MIN: CPT | Performed by: STUDENT IN AN ORGANIZED HEALTH CARE EDUCATION/TRAINING PROGRAM

## 2025-08-13 PROCEDURE — 3074F SYST BP LT 130 MM HG: CPT | Performed by: STUDENT IN AN ORGANIZED HEALTH CARE EDUCATION/TRAINING PROGRAM

## 2025-08-13 RX ORDER — LORAZEPAM 0.5 MG/1
0.5 TABLET ORAL EVERY 4 HOURS PRN
COMMUNITY

## 2025-08-13 RX ORDER — LORAZEPAM 0.5 MG/1
0.5 TABLET ORAL 2 TIMES DAILY PRN
Qty: 60 TABLET | Refills: 0 | Status: SHIPPED | OUTPATIENT
Start: 2025-08-13 | End: 2025-09-12

## 2025-08-13 RX ORDER — LAMOTRIGINE 200 MG/1
200 TABLET, EXTENDED RELEASE ORAL 3 TIMES DAILY
Qty: 270 TABLET | Refills: 3 | Status: SHIPPED | OUTPATIENT
Start: 2025-08-13 | End: 2026-08-08

## 2025-08-13 RX ORDER — PROPRANOLOL HYDROCHLORIDE 60 MG/1
60 CAPSULE, EXTENDED RELEASE ORAL DAILY
Qty: 30 CAPSULE | Refills: 5 | Status: SHIPPED | OUTPATIENT
Start: 2025-08-13 | End: 2026-02-09

## 2025-08-13 RX ORDER — CLOBAZAM 20 MG/1
3 TABLET ORAL
Qty: 90 TABLET | Refills: 5 | Status: SHIPPED | OUTPATIENT
Start: 2025-08-13 | End: 2026-02-09

## 2025-08-13 RX ORDER — FREMANEZUMAB-VFRM 225 MG/1.5ML
225 INJECTION SUBCUTANEOUS
Qty: 1.5 ML | Refills: 11 | Status: SHIPPED | OUTPATIENT
Start: 2025-08-13 | End: 2026-08-08

## 2025-08-13 ASSESSMENT — PATIENT HEALTH QUESTIONNAIRE - PHQ9
CLINICAL INTERPRETATION OF PHQ2 SCORE: 1
5. POOR APPETITE OR OVEREATING: 1 - SEVERAL DAYS
SUM OF ALL RESPONSES TO PHQ QUESTIONS 1-9: 6

## 2025-08-13 ASSESSMENT — LIFESTYLE VARIABLES
AUDIT-C TOTAL SCORE: 0
SKIP TO QUESTIONS 9-10: 1
HOW OFTEN DO YOU HAVE A DRINK CONTAINING ALCOHOL: NEVER
HOW MANY STANDARD DRINKS CONTAINING ALCOHOL DO YOU HAVE ON A TYPICAL DAY: PATIENT DOES NOT DRINK
HOW OFTEN DO YOU HAVE SIX OR MORE DRINKS ON ONE OCCASION: NEVER

## 2025-08-13 ASSESSMENT — FIBROSIS 4 INDEX: FIB4 SCORE: 1.229750923802691347

## (undated) DEVICE — SODIUM CHL IRRIGATION 0.9% 1000ML (12EA/CA)

## (undated) DEVICE — PENCIL ELECTSURG 10FT BTN SWH - (50/CA)

## (undated) DEVICE — CORDS BIPOLAR COAGULATION - 12FT STERILE DISP. (10EA/BX)

## (undated) DEVICE — TUBING CLEARLINK DUO-VENT - C-FLO (48EA/CA)

## (undated) DEVICE — SUTURE 5-0 PLAIN GUT PC-1 - (12/BX)

## (undated) DEVICE — SUTURE 4-0 VICRYL PLUS SH - UNDYED 27 INCH (36PK/BX)

## (undated) DEVICE — CANISTER SUCTION RIGID RED 1500CC (40EA/CA)

## (undated) DEVICE — DRAPE STRLE REG TOWEL 18X24 - (10/BX 4BX/CA)"

## (undated) DEVICE — MASK ANESTHESIA ADULT  - (100/CA)

## (undated) DEVICE — ELECTRODE 850 FOAM ADHESIVE - HYDROGEL RADIOTRNSPRNT (50/PK)

## (undated) DEVICE — SUTURE 4-0 ETHILON FS-2 18 (36PK/BX)"

## (undated) DEVICE — WATER IRRIGATION STERILE 1000ML (12EA/CA)

## (undated) DEVICE — VESSELOOP MAXI BLUE STERILE- SURG-I-LOOP (10EA/BX)

## (undated) DEVICE — TUNNELER VAGUS NERVE ---MIN PURCHASE 5EA---

## (undated) DEVICE — SENSOR SPO2 NEO LNCS ADHESIVE (20/BX) SEE USER NOTES

## (undated) DEVICE — KIT ANESTHESIA W/CIRCUIT & 3/LT BAG W/FILTER (20EA/CA)

## (undated) DEVICE — SUCTION INSTRUMENT YANKAUER BULBOUS TIP W/O VENT (50EA/CA)

## (undated) DEVICE — TUBE E-T HI-LO CUFF 7.5MM (10EA/PK)

## (undated) DEVICE — SUTURE GENERAL

## (undated) DEVICE — TRAY SRGPRP PVP IOD WT PRP - (20/CA)

## (undated) DEVICE — DRAPE LASER ARM CAMERA - 7 X 96 (25EA/BX)

## (undated) DEVICE — SUTURE 3-0 SILK FS 18 INCH - (12PK/BX)

## (undated) DEVICE — SUTURE 2-0 SILK 12 X 18" (36PK/BX)"

## (undated) DEVICE — CATHETER IV 20 GA X 1-1/4 ---SURG.& SDS ONLY--- (50EA/BX)

## (undated) DEVICE — HEAD HOLDER JUNIOR/ADULT

## (undated) DEVICE — NEPTUNE 4 PORT MANIFOLD - (20/PK)

## (undated) DEVICE — PROTECTOR ULNA NERVE - (36PR/CA)

## (undated) DEVICE — CANISTER SUCTION 3000ML MECHANICAL FILTER AUTO SHUTOFF MEDI-VAC NONSTERILE LF DISP  (40EA/CA)

## (undated) DEVICE — SET LEADWIRE 5 LEAD BEDSIDE DISPOSABLE ECG (1SET OF 5/EA)

## (undated) DEVICE — CLOSURE SKIN STRIP 1/2 X 4 IN - (STERI STRIP) (50/BX 4BX/CA)

## (undated) DEVICE — ADHESIVE MASTISOL - (48/BX)

## (undated) DEVICE — TUBE CONNECTING SUCTION - CLEAR PLASTIC STERILE 72 IN (50EA/CA)

## (undated) DEVICE — KIT  I.V. START (100EA/CA)

## (undated) DEVICE — SET EXTENSION WITH 2 PORTS (48EA/CA) ***PART #2C8610 IS A SUBSTITUTE*****

## (undated) DEVICE — LACTATED RINGERS INJ 1000 ML - (14EA/CA 60CA/PF)

## (undated) DEVICE — BLADE SURGICAL CLIPPER - (50EA/CA)

## (undated) DEVICE — PACK ENT OR - (2EA/CA)

## (undated) DEVICE — ELECTRODE DUAL RETURN W/ CORD - (50/PK)

## (undated) DEVICE — SUTURE 3-0 SILK 12 X 18 IN - (36/BX)